# Patient Record
Sex: MALE | Race: WHITE | URBAN - METROPOLITAN AREA
[De-identification: names, ages, dates, MRNs, and addresses within clinical notes are randomized per-mention and may not be internally consistent; named-entity substitution may affect disease eponyms.]

---

## 2018-06-08 ENCOUNTER — EMERGENCY (EMERGENCY)
Facility: HOSPITAL | Age: 58
LOS: 0 days | Discharge: HOME | End: 2018-06-08
Attending: EMERGENCY MEDICINE | Admitting: EMERGENCY MEDICINE

## 2018-06-08 VITALS
OXYGEN SATURATION: 98 % | SYSTOLIC BLOOD PRESSURE: 175 MMHG | RESPIRATION RATE: 19 BRPM | TEMPERATURE: 99 F | DIASTOLIC BLOOD PRESSURE: 97 MMHG | HEART RATE: 100 BPM

## 2018-06-08 VITALS — DIASTOLIC BLOOD PRESSURE: 94 MMHG | SYSTOLIC BLOOD PRESSURE: 148 MMHG | HEART RATE: 88 BPM

## 2018-06-08 DIAGNOSIS — Z79.02 LONG TERM (CURRENT) USE OF ANTITHROMBOTICS/ANTIPLATELETS: ICD-10-CM

## 2018-06-08 DIAGNOSIS — Z79.01 LONG TERM (CURRENT) USE OF ANTICOAGULANTS: ICD-10-CM

## 2018-06-08 DIAGNOSIS — Z79.82 LONG TERM (CURRENT) USE OF ASPIRIN: ICD-10-CM

## 2018-06-08 DIAGNOSIS — I25.810 ATHEROSCLEROSIS OF CORONARY ARTERY BYPASS GRAFT(S) WITHOUT ANGINA PECTORIS: Chronic | ICD-10-CM

## 2018-06-08 DIAGNOSIS — Z79.899 OTHER LONG TERM (CURRENT) DRUG THERAPY: ICD-10-CM

## 2018-06-08 DIAGNOSIS — R06.02 SHORTNESS OF BREATH: ICD-10-CM

## 2018-06-08 DIAGNOSIS — Z79.51 LONG TERM (CURRENT) USE OF INHALED STEROIDS: ICD-10-CM

## 2018-06-08 DIAGNOSIS — Z79.84 LONG TERM (CURRENT) USE OF ORAL HYPOGLYCEMIC DRUGS: ICD-10-CM

## 2018-06-08 DIAGNOSIS — Z87.891 PERSONAL HISTORY OF NICOTINE DEPENDENCE: ICD-10-CM

## 2018-06-08 DIAGNOSIS — J40 BRONCHITIS, NOT SPECIFIED AS ACUTE OR CHRONIC: ICD-10-CM

## 2018-06-08 LAB
ALBUMIN SERPL ELPH-MCNC: 4 G/DL — SIGNIFICANT CHANGE UP (ref 3.5–5.2)
ALP SERPL-CCNC: 83 U/L — SIGNIFICANT CHANGE UP (ref 30–115)
ALT FLD-CCNC: 25 U/L — SIGNIFICANT CHANGE UP (ref 0–41)
ANION GAP SERPL CALC-SCNC: 10 MMOL/L — SIGNIFICANT CHANGE UP (ref 7–14)
APTT BLD: 30.4 SEC — SIGNIFICANT CHANGE UP (ref 27–39.2)
AST SERPL-CCNC: 15 U/L — SIGNIFICANT CHANGE UP (ref 0–41)
BASOPHILS # BLD AUTO: 0.07 K/UL — SIGNIFICANT CHANGE UP (ref 0–0.2)
BASOPHILS NFR BLD AUTO: 0.7 % — SIGNIFICANT CHANGE UP (ref 0–1)
BILIRUB SERPL-MCNC: 0.5 MG/DL — SIGNIFICANT CHANGE UP (ref 0.2–1.2)
BUN SERPL-MCNC: 16 MG/DL — SIGNIFICANT CHANGE UP (ref 10–20)
CALCIUM SERPL-MCNC: 9.4 MG/DL — SIGNIFICANT CHANGE UP (ref 8.5–10.1)
CHLORIDE SERPL-SCNC: 104 MMOL/L — SIGNIFICANT CHANGE UP (ref 98–110)
CK SERPL-CCNC: 87 U/L — SIGNIFICANT CHANGE UP (ref 0–225)
CO2 SERPL-SCNC: 29 MMOL/L — SIGNIFICANT CHANGE UP (ref 17–32)
CREAT SERPL-MCNC: 1 MG/DL — SIGNIFICANT CHANGE UP (ref 0.7–1.5)
EOSINOPHIL # BLD AUTO: 0.27 K/UL — SIGNIFICANT CHANGE UP (ref 0–0.7)
EOSINOPHIL NFR BLD AUTO: 2.7 % — SIGNIFICANT CHANGE UP (ref 0–8)
GLUCOSE SERPL-MCNC: 159 MG/DL — HIGH (ref 70–99)
HCT VFR BLD CALC: 39.7 % — LOW (ref 42–52)
HGB BLD-MCNC: 12.1 G/DL — LOW (ref 14–18)
IMM GRANULOCYTES NFR BLD AUTO: 1.2 % — HIGH (ref 0.1–0.3)
INR BLD: 1.03 RATIO — SIGNIFICANT CHANGE UP (ref 0.65–1.3)
LYMPHOCYTES # BLD AUTO: 2.41 K/UL — SIGNIFICANT CHANGE UP (ref 1.2–3.4)
LYMPHOCYTES # BLD AUTO: 24.5 % — SIGNIFICANT CHANGE UP (ref 20.5–51.1)
MCHC RBC-ENTMCNC: 23.7 PG — LOW (ref 27–31)
MCHC RBC-ENTMCNC: 30.5 G/DL — LOW (ref 32–37)
MCV RBC AUTO: 77.7 FL — LOW (ref 80–94)
MONOCYTES # BLD AUTO: 0.79 K/UL — HIGH (ref 0.1–0.6)
MONOCYTES NFR BLD AUTO: 8 % — SIGNIFICANT CHANGE UP (ref 1.7–9.3)
NEUTROPHILS # BLD AUTO: 6.18 K/UL — SIGNIFICANT CHANGE UP (ref 1.4–6.5)
NEUTROPHILS NFR BLD AUTO: 62.9 % — SIGNIFICANT CHANGE UP (ref 42.2–75.2)
NRBC # BLD: 0 /100 WBCS — SIGNIFICANT CHANGE UP (ref 0–0)
PLATELET # BLD AUTO: 362 K/UL — SIGNIFICANT CHANGE UP (ref 130–400)
POTASSIUM SERPL-MCNC: 4.5 MMOL/L — SIGNIFICANT CHANGE UP (ref 3.5–5)
POTASSIUM SERPL-SCNC: 4.5 MMOL/L — SIGNIFICANT CHANGE UP (ref 3.5–5)
PROT SERPL-MCNC: 6.9 G/DL — SIGNIFICANT CHANGE UP (ref 6–8)
PROTHROM AB SERPL-ACNC: 11.1 SEC — SIGNIFICANT CHANGE UP (ref 9.95–12.87)
RBC # BLD: 5.11 M/UL — SIGNIFICANT CHANGE UP (ref 4.7–6.1)
RBC # FLD: 15 % — HIGH (ref 11.5–14.5)
SODIUM SERPL-SCNC: 143 MMOL/L — SIGNIFICANT CHANGE UP (ref 135–146)
TROPONIN T SERPL-MCNC: <0.01 NG/ML — SIGNIFICANT CHANGE UP
WBC # BLD: 9.84 K/UL — SIGNIFICANT CHANGE UP (ref 4.8–10.8)
WBC # FLD AUTO: 9.84 K/UL — SIGNIFICANT CHANGE UP (ref 4.8–10.8)

## 2018-06-08 RX ORDER — IPRATROPIUM/ALBUTEROL SULFATE 18-103MCG
3 AEROSOL WITH ADAPTER (GRAM) INHALATION ONCE
Qty: 0 | Refills: 0 | Status: COMPLETED | OUTPATIENT
Start: 2018-06-08 | End: 2018-06-08

## 2018-06-08 RX ORDER — SODIUM CHLORIDE 9 MG/ML
3 INJECTION INTRAMUSCULAR; INTRAVENOUS; SUBCUTANEOUS EVERY 8 HOURS
Qty: 0 | Refills: 0 | Status: DISCONTINUED | OUTPATIENT
Start: 2018-06-08 | End: 2018-06-08

## 2018-06-08 RX ORDER — AZITHROMYCIN 500 MG/1
1 TABLET, FILM COATED ORAL
Qty: 6 | Refills: 0
Start: 2018-06-08 | End: 2018-06-12

## 2018-06-08 RX ORDER — ALBUTEROL 90 UG/1
1 AEROSOL, METERED ORAL ONCE
Qty: 0 | Refills: 0 | Status: DISCONTINUED | OUTPATIENT
Start: 2018-06-08 | End: 2018-06-08

## 2018-06-08 RX ADMIN — Medication 3 MILLILITER(S): at 15:30

## 2018-06-08 RX ADMIN — Medication 3 MILLILITER(S): at 14:16

## 2018-06-08 RX ADMIN — SODIUM CHLORIDE 3 MILLILITER(S): 9 INJECTION INTRAMUSCULAR; INTRAVENOUS; SUBCUTANEOUS at 14:16

## 2018-06-08 NOTE — ED PROVIDER NOTE - ATTENDING CONTRIBUTION TO CARE
Patient felt better during ED stay and wished to be d/c home. He wanted no prednisone tx and verbalized understanding of risk/benefit.  I discussed with patient treatment, need to follow-up as well as reasons to return and they agree.  Copies of results were reviewed with and given to patient to bring to f/u.

## 2018-06-08 NOTE — ED PROVIDER NOTE - OBJECTIVE STATEMENT
mild SOB , several week with cough, Worse past 3 days, no pain, + chills and sweats at night, C/o cough up thick sputum at times. No abdominal pain, no C/P, no back pain,

## 2019-02-17 ENCOUNTER — INPATIENT (INPATIENT)
Facility: HOSPITAL | Age: 59
LOS: 2 days | Discharge: HOME | End: 2019-02-20
Attending: INTERNAL MEDICINE | Admitting: INTERNAL MEDICINE

## 2019-02-17 VITALS
SYSTOLIC BLOOD PRESSURE: 168 MMHG | TEMPERATURE: 98 F | DIASTOLIC BLOOD PRESSURE: 82 MMHG | RESPIRATION RATE: 20 BRPM | OXYGEN SATURATION: 98 % | WEIGHT: 227.96 LBS | HEART RATE: 105 BPM | HEIGHT: 67 IN

## 2019-02-17 DIAGNOSIS — I25.810 ATHEROSCLEROSIS OF CORONARY ARTERY BYPASS GRAFT(S) WITHOUT ANGINA PECTORIS: Chronic | ICD-10-CM

## 2019-02-17 DIAGNOSIS — I49.9 CARDIAC ARRHYTHMIA, UNSPECIFIED: Chronic | ICD-10-CM

## 2019-02-17 LAB
ALBUMIN SERPL ELPH-MCNC: 4 G/DL — SIGNIFICANT CHANGE UP (ref 3.5–5.2)
ALP SERPL-CCNC: 65 U/L — SIGNIFICANT CHANGE UP (ref 30–115)
ALT FLD-CCNC: 28 U/L — SIGNIFICANT CHANGE UP (ref 0–41)
ANION GAP SERPL CALC-SCNC: 9 MMOL/L — SIGNIFICANT CHANGE UP (ref 7–14)
AST SERPL-CCNC: 54 U/L — HIGH (ref 0–41)
BASE EXCESS BLDV CALC-SCNC: 2.6 MMOL/L — HIGH (ref -2–2)
BILIRUB SERPL-MCNC: 0.6 MG/DL — SIGNIFICANT CHANGE UP (ref 0.2–1.2)
BUN SERPL-MCNC: 16 MG/DL — SIGNIFICANT CHANGE UP (ref 10–20)
CA-I SERPL-SCNC: 1.2 MMOL/L — SIGNIFICANT CHANGE UP (ref 1.12–1.3)
CALCIUM SERPL-MCNC: 8.9 MG/DL — SIGNIFICANT CHANGE UP (ref 8.5–10.1)
CHLORIDE SERPL-SCNC: 102 MMOL/L — SIGNIFICANT CHANGE UP (ref 98–110)
CO2 SERPL-SCNC: 27 MMOL/L — SIGNIFICANT CHANGE UP (ref 17–32)
CREAT SERPL-MCNC: 0.9 MG/DL — SIGNIFICANT CHANGE UP (ref 0.7–1.5)
GAS PNL BLDV: 140 MMOL/L — SIGNIFICANT CHANGE UP (ref 136–145)
GAS PNL BLDV: SIGNIFICANT CHANGE UP
GLUCOSE SERPL-MCNC: 115 MG/DL — HIGH (ref 70–99)
HCO3 BLDV-SCNC: 27 MMOL/L — SIGNIFICANT CHANGE UP (ref 22–29)
HCT VFR BLD CALC: 41.7 % — LOW (ref 42–52)
HCT VFR BLDA CALC: 39.7 % — SIGNIFICANT CHANGE UP (ref 34–44)
HGB BLD CALC-MCNC: 12.9 G/DL — LOW (ref 14–18)
HGB BLD-MCNC: 12.3 G/DL — LOW (ref 14–18)
HOROWITZ INDEX BLDV+IHG-RTO: 21 — SIGNIFICANT CHANGE UP
LACTATE BLDV-MCNC: 1.3 MMOL/L — SIGNIFICANT CHANGE UP (ref 0.5–1.6)
LIDOCAIN IGE QN: 35 U/L — SIGNIFICANT CHANGE UP (ref 7–60)
MAGNESIUM SERPL-MCNC: 2.2 MG/DL — SIGNIFICANT CHANGE UP (ref 1.8–2.4)
MCHC RBC-ENTMCNC: 23.5 PG — LOW (ref 27–31)
MCHC RBC-ENTMCNC: 29.5 G/DL — LOW (ref 32–37)
MCV RBC AUTO: 79.6 FL — LOW (ref 80–94)
NRBC # BLD: 0 /100 WBCS — SIGNIFICANT CHANGE UP (ref 0–0)
PCO2 BLDV: 41 MMHG — SIGNIFICANT CHANGE UP (ref 41–51)
PH BLDV: 7.43 — SIGNIFICANT CHANGE UP (ref 7.26–7.43)
PLATELET # BLD AUTO: 347 K/UL — SIGNIFICANT CHANGE UP (ref 130–400)
PO2 BLDV: 53 MMHG — HIGH (ref 20–40)
POTASSIUM BLDV-SCNC: 3.6 MMOL/L — SIGNIFICANT CHANGE UP (ref 3.3–5.6)
POTASSIUM SERPL-MCNC: 6.4 MMOL/L — CRITICAL HIGH (ref 3.5–5)
POTASSIUM SERPL-SCNC: 6.4 MMOL/L — CRITICAL HIGH (ref 3.5–5)
PROT SERPL-MCNC: 7.2 G/DL — SIGNIFICANT CHANGE UP (ref 6–8)
RBC # BLD: 5.24 M/UL — SIGNIFICANT CHANGE UP (ref 4.7–6.1)
RBC # FLD: 14.6 % — HIGH (ref 11.5–14.5)
SAO2 % BLDV: 88 % — SIGNIFICANT CHANGE UP
SODIUM SERPL-SCNC: 138 MMOL/L — SIGNIFICANT CHANGE UP (ref 135–146)
TROPONIN T SERPL-MCNC: <0.01 NG/ML — SIGNIFICANT CHANGE UP
WBC # BLD: 11.66 K/UL — HIGH (ref 4.8–10.8)
WBC # FLD AUTO: 11.66 K/UL — HIGH (ref 4.8–10.8)

## 2019-02-17 RX ORDER — CHLORPROMAZINE HCL 10 MG
25 TABLET ORAL ONCE
Qty: 0 | Refills: 0 | Status: COMPLETED | OUTPATIENT
Start: 2019-02-17 | End: 2019-02-17

## 2019-02-17 RX ORDER — FAMOTIDINE 10 MG/ML
20 INJECTION INTRAVENOUS ONCE
Qty: 0 | Refills: 0 | Status: COMPLETED | OUTPATIENT
Start: 2019-02-17 | End: 2019-02-17

## 2019-02-17 RX ORDER — DIPHENHYDRAMINE HYDROCHLORIDE AND LIDOCAINE HYDROCHLORIDE AND ALUMINUM HYDROXIDE AND MAGNESIUM HYDRO
30 KIT ONCE
Qty: 0 | Refills: 0 | Status: COMPLETED | OUTPATIENT
Start: 2019-02-17 | End: 2019-02-17

## 2019-02-17 RX ADMIN — Medication 25 MILLIGRAM(S): at 20:35

## 2019-02-17 RX ADMIN — FAMOTIDINE 20 MILLIGRAM(S): 10 INJECTION INTRAVENOUS at 21:24

## 2019-02-17 RX ADMIN — FAMOTIDINE 100 MILLIGRAM(S): 10 INJECTION INTRAVENOUS at 20:53

## 2019-02-17 RX ADMIN — DIPHENHYDRAMINE HYDROCHLORIDE AND LIDOCAINE HYDROCHLORIDE AND ALUMINUM HYDROXIDE AND MAGNESIUM HYDRO 30 MILLILITER(S): KIT at 21:24

## 2019-02-17 NOTE — ED ADULT TRIAGE NOTE - CHIEF COMPLAINT QUOTE
I feel pressure in my chest and I've had hiccups for 3 days.  I went to urgent care and they gave me Gabapentin but it didn't work.

## 2019-02-17 NOTE — ED ADULT NURSE NOTE - PMH
Cardiac abnormality    Chronic obstructive pulmonary disease    GERD (gastroesophageal reflux disease)

## 2019-02-17 NOTE — ED PROVIDER NOTE - PHYSICAL EXAMINATION
CONST: Well appearing in NAD  EYES: Sclera and conjunctiva clear.  CARD: Normal S1 S2; Normal rate and rhythm  RESP: Equal BS B/L, No wheezes, rhonchi or rales. No distress  GI: Soft, non-tender, non-distended.  MS: Normal ROM in all extremities. No edema of lower extremities, no calf pain, radial/pedal pulses 2+ bilaterally  SKIN: Warm, dry, no acute rashes. Good turgor  NEURO: A&Ox3, No focal deficits. Strength 5/5 with no sensory deficits

## 2019-02-17 NOTE — ED ADULT NURSE NOTE - PSH
Cardiac arrhythmia  bypass surgery 9 years ago  Coronary artery disease involving other coronary artery bypass graft, angina presence unspecified

## 2019-02-17 NOTE — ED PROVIDER NOTE - ATTENDING CONTRIBUTION TO CARE
59 yo M with history of longstanding gastritis, previous erosive esophagitis, recently non compliant with GI meds/follow up, CAD sp 2 vessel CABG 9 years ago, here for assessment of intractable hiccups x 3 days. Sx began after patient had multiple episodes of self induced vomiting with dyspepsia which he attributes to eating pizza right after eating cereal. Today he developed epigastric pain/pressure after a string of "strong" hiccups which prompted him to come to ED.    He has tried many home remedies for hiccups, was seen at [facility] and prescribed gabapentin but sx have not improved.     Currently he reports pain is mild, no associated dizziness, dyspnea.     Of note, he has had previous intractable hiccups x 1 episode about 20 years ago.    Patient is tachy, however looks well despite hiccups, has epigastric tenderness to palpation.     Hiccups are most likely GI in origin, however given cardiac history have to consider possible cardiac etiology as well.    Will give thorazine, check labs, EKG and reassess. Will consider additional chest/abdominal imaging as needed.

## 2019-02-17 NOTE — ED PROVIDER NOTE - NS ED ROS FT
Constitutional: See HPI.  Eyes: No visual changes, eye pain or discharge.   ENMT: No hearing changes, pain, discharge or infections.   Cardiac: No SOB or edema. No chest pain with exertion.  Respiratory: No cough or respiratory distress.  GI: No nausea, vomiting, diarrhea or abdominal pain.  : No dysuria, frequency or burning. No Discharge  MS: No myalgia, muscle weakness, joint pain or back pain.  Neuro: No headache or weakness  Skin: No skin rash.  Except as documented in the HPI, all other systems are negative.

## 2019-02-17 NOTE — ED PROVIDER NOTE - PROGRESS NOTE DETAILS
pt signed out pending CTA Chest D/w patient all results and copies given. Aware of Nodules. Pt is still hiccupping despite multiple medications. D/w Dr. Johnson

## 2019-02-17 NOTE — ED PROVIDER NOTE - OBJECTIVE STATEMENT
58 y.o male w/ hx of Dm, bypass, HTN, HLD, bypass x 2 presents to the ED for evaluation of chest pressure x 1 day.  Pt states that for the past 3 days experiencing constant hiccups.  Today developed midsternal chest pressure which is nonradiating/ nonexertional prompting visit to the ED.  No associated dyspnea, diaphoresis. Also admits to burning sensation in chest.  Pt adds no other complaints at this time. 58 y.o male w/ hx of Dm, bypass, HTN, HLD, bypass x 2 presents to the ED for evaluation of chest pressure x 1 day.  Pt states that for the past 3 days experiencing constant hiccups.  Today developed midsternal chest pressure which is nonradiating/ nonexertional prompting visit to the ED.  No associated dyspnea, diaphoresis. Also admits to burning sensation in chest.  Pt adds no other complaints at this time.  Cardiologist Dr. Carmona.  No recent cardiac workup in past yr. 58 y.o male w/ hx of DM, esophageal ulcers, HTN, HLD, bypass x 2 presents to the ED for evaluation of chest pressure x 1 day.  Pt states that for the past 3 days experiencing constant hiccups.  Today developed midsternal chest pressure which is nonradiating/ nonexertional prompting visit to the ED.  No associated dyspnea, diaphoresis. Also admits to burning sensation in chest.  Pt adds no other complaints at this time.  Cardiologist Dr. Carmona.  No recent cardiac workup in past yr.

## 2019-02-17 NOTE — ED ADULT NURSE NOTE - NSIMPLEMENTINTERV_GEN_ALL_ED
Implemented All Universal Safety Interventions:  [place] to call system. Call bell, personal items and telephone within reach. Instruct patient to call for assistance. Room bathroom lighting operational. Non-slip footwear when patient is off stretcher. Physically safe environment: no spills, clutter or unnecessary equipment. Stretcher in lowest position, wheels locked, appropriate side rails in place.

## 2019-02-17 NOTE — ED PROVIDER NOTE - CLINICAL SUMMARY MEDICAL DECISION MAKING FREE TEXT BOX
Despite multiple medications, hiccups persisted, patient had CT chest to rule out aortic dissection, mediastinal pathology of hiccups and was found to have esophagitis on CT -- given this, plus inability to control hiccups, patient to be admitted, will need urgent GI eval.

## 2019-02-18 DIAGNOSIS — K21.9 GASTRO-ESOPHAGEAL REFLUX DISEASE WITHOUT ESOPHAGITIS: ICD-10-CM

## 2019-02-18 DIAGNOSIS — R07.89 OTHER CHEST PAIN: ICD-10-CM

## 2019-02-18 DIAGNOSIS — E78.00 PURE HYPERCHOLESTEROLEMIA, UNSPECIFIED: ICD-10-CM

## 2019-02-18 DIAGNOSIS — K25.7 CHRONIC GASTRIC ULCER WITHOUT HEMORRHAGE OR PERFORATION: ICD-10-CM

## 2019-02-18 DIAGNOSIS — R06.6 HICCOUGH: ICD-10-CM

## 2019-02-18 DIAGNOSIS — J44.9 CHRONIC OBSTRUCTIVE PULMONARY DISEASE, UNSPECIFIED: ICD-10-CM

## 2019-02-18 LAB
GLUCOSE BLDC GLUCOMTR-MCNC: 123 MG/DL — HIGH (ref 70–99)
GLUCOSE BLDC GLUCOMTR-MCNC: 134 MG/DL — HIGH (ref 70–99)
HCV AB S/CO SERPL IA: 0.54 S/CO — SIGNIFICANT CHANGE UP (ref 0–0.79)
HCV AB SERPL-IMP: SIGNIFICANT CHANGE UP
TROPONIN T SERPL-MCNC: <0.01 NG/ML — SIGNIFICANT CHANGE UP

## 2019-02-18 RX ORDER — METFORMIN HYDROCHLORIDE 850 MG/1
500 TABLET ORAL
Qty: 0 | Refills: 0 | Status: DISCONTINUED | OUTPATIENT
Start: 2019-02-18 | End: 2019-02-18

## 2019-02-18 RX ORDER — ASPIRIN/CALCIUM CARB/MAGNESIUM 324 MG
81 TABLET ORAL DAILY
Qty: 0 | Refills: 0 | Status: DISCONTINUED | OUTPATIENT
Start: 2019-02-18 | End: 2019-02-20

## 2019-02-18 RX ORDER — HEPARIN SODIUM 5000 [USP'U]/ML
5000 INJECTION INTRAVENOUS; SUBCUTANEOUS EVERY 12 HOURS
Qty: 0 | Refills: 0 | Status: DISCONTINUED | OUTPATIENT
Start: 2019-02-18 | End: 2019-02-20

## 2019-02-18 RX ORDER — PANTOPRAZOLE SODIUM 20 MG/1
40 TABLET, DELAYED RELEASE ORAL ONCE
Qty: 0 | Refills: 0 | Status: COMPLETED | OUTPATIENT
Start: 2019-02-18 | End: 2019-02-18

## 2019-02-18 RX ORDER — CLOPIDOGREL BISULFATE 75 MG/1
75 TABLET, FILM COATED ORAL DAILY
Qty: 0 | Refills: 0 | Status: DISCONTINUED | OUTPATIENT
Start: 2019-02-18 | End: 2019-02-20

## 2019-02-18 RX ORDER — NIFEDIPINE 30 MG
10 TABLET, EXTENDED RELEASE 24 HR ORAL THREE TIMES A DAY
Qty: 0 | Refills: 0 | Status: DISCONTINUED | OUTPATIENT
Start: 2019-02-18 | End: 2019-02-18

## 2019-02-18 RX ORDER — PANTOPRAZOLE SODIUM 20 MG/1
40 TABLET, DELAYED RELEASE ORAL
Qty: 0 | Refills: 0 | Status: DISCONTINUED | OUTPATIENT
Start: 2019-02-18 | End: 2019-02-18

## 2019-02-18 RX ORDER — METOPROLOL TARTRATE 50 MG
25 TABLET ORAL DAILY
Qty: 0 | Refills: 0 | Status: DISCONTINUED | OUTPATIENT
Start: 2019-02-18 | End: 2019-02-20

## 2019-02-18 RX ORDER — CHLORPROMAZINE HCL 10 MG
25 TABLET ORAL THREE TIMES A DAY
Qty: 0 | Refills: 0 | Status: DISCONTINUED | OUTPATIENT
Start: 2019-02-18 | End: 2019-02-19

## 2019-02-18 RX ORDER — HEPARIN SODIUM 5000 [USP'U]/ML
5000 INJECTION INTRAVENOUS; SUBCUTANEOUS EVERY 12 HOURS
Qty: 0 | Refills: 0 | Status: DISCONTINUED | OUTPATIENT
Start: 2019-02-18 | End: 2019-02-18

## 2019-02-18 RX ORDER — SUCRALFATE 1 G
1 TABLET ORAL
Qty: 0 | Refills: 0 | Status: DISCONTINUED | OUTPATIENT
Start: 2019-02-18 | End: 2019-02-20

## 2019-02-18 RX ORDER — NIFEDIPINE 30 MG
30 TABLET, EXTENDED RELEASE 24 HR ORAL DAILY
Qty: 0 | Refills: 0 | Status: DISCONTINUED | OUTPATIENT
Start: 2019-02-18 | End: 2019-02-20

## 2019-02-18 RX ORDER — PANTOPRAZOLE SODIUM 20 MG/1
40 TABLET, DELAYED RELEASE ORAL
Qty: 0 | Refills: 0 | Status: DISCONTINUED | OUTPATIENT
Start: 2019-02-18 | End: 2019-02-20

## 2019-02-18 RX ORDER — METOCLOPRAMIDE HCL 10 MG
10 TABLET ORAL ONCE
Qty: 0 | Refills: 0 | Status: COMPLETED | OUTPATIENT
Start: 2019-02-18 | End: 2019-02-18

## 2019-02-18 RX ORDER — ALBUTEROL 90 UG/1
2.5 AEROSOL, METERED ORAL ONCE
Qty: 0 | Refills: 0 | Status: DISCONTINUED | OUTPATIENT
Start: 2019-02-18 | End: 2019-02-20

## 2019-02-18 RX ADMIN — Medication 25 MILLIGRAM(S): at 06:14

## 2019-02-18 RX ADMIN — Medication 1 GRAM(S): at 18:21

## 2019-02-18 RX ADMIN — Medication 25 MILLIGRAM(S): at 15:55

## 2019-02-18 RX ADMIN — Medication 81 MILLIGRAM(S): at 11:08

## 2019-02-18 RX ADMIN — PANTOPRAZOLE SODIUM 40 MILLIGRAM(S): 20 TABLET, DELAYED RELEASE ORAL at 18:21

## 2019-02-18 RX ADMIN — Medication 1 GRAM(S): at 11:11

## 2019-02-18 RX ADMIN — Medication 104 MILLIGRAM(S): at 01:08

## 2019-02-18 RX ADMIN — CLOPIDOGREL BISULFATE 75 MILLIGRAM(S): 75 TABLET, FILM COATED ORAL at 11:08

## 2019-02-18 RX ADMIN — PANTOPRAZOLE SODIUM 40 MILLIGRAM(S): 20 TABLET, DELAYED RELEASE ORAL at 06:14

## 2019-02-18 RX ADMIN — METFORMIN HYDROCHLORIDE 500 MILLIGRAM(S): 850 TABLET ORAL at 06:14

## 2019-02-18 RX ADMIN — Medication 30 MILLIGRAM(S): at 13:18

## 2019-02-18 RX ADMIN — PANTOPRAZOLE SODIUM 40 MILLIGRAM(S): 20 TABLET, DELAYED RELEASE ORAL at 01:08

## 2019-02-18 RX ADMIN — Medication 10 MILLIGRAM(S): at 11:10

## 2019-02-18 RX ADMIN — Medication 10 MILLIGRAM(S): at 06:14

## 2019-02-18 RX ADMIN — Medication 1 GRAM(S): at 23:05

## 2019-02-18 NOTE — H&P ADULT - NEUROLOGICAL DETAILS
sensation intact/cranial nerves intact/superficial reflexes intact/normal strength/deep reflexes intact/no spontaneous movement/alert and oriented x 3/responds to pain/responds to verbal commands

## 2019-02-18 NOTE — PROGRESS NOTE ADULT - ASSESSMENT
58 y.o male w/ hx of DM, esophageal ulcers, HTN, HLD, bypass x 2 presents to the ED for evaluation of chest pressure x 1 day.  Pt states that for the past 3 days experiencing constant hiccups, which started after he ate pizza and then induced vomiting, with viscous brown material.  Day of presentation developed midsternal chest pressure which is nonradiating/ nonexertional prompting visit to the ED.     1. Chest pain  now resolved, CE negative x2, EKG without st/t wave changes, similar to prior  outpatient f/u with Dr Cardozo  dc tele    2. Hiccups/Esophageal Ulcer in the past  patient also endorses epigastric pain  CTA remarkable for thickened esophagus, with fluid, concern for esophagitis  Last EGD/[facility] few years ago  c/w thorazine, PPI Q12, Carafate AC and HS  Gi eval Dr Giordano    3. DM  monitor FS, hold metformin given recent contrast administration  can start insulin if FS elevated    4. CAD s/p Bypass  c/w ASA and plavix    5. HTN  c/w nifedipine    dvt ppx    #Progress Note Handoff  Pending (specify):  Consults___x______, Tests________, Test Results_______, Other_________  Family discussion: with patient  Disposition: Home_x__/SNF___/Other________/Unknown at this time________ 58 y.o male w/ hx of DM, esophageal ulcers, HTN, HLD, bypass x 2 presents to the ED for evaluation of chest pressure x 1 day.  Pt states that for the past 3 days experiencing constant hiccups, which started after he ate pizza and then induced vomiting, with viscous brown material.  Day of presentation developed midsternal chest pressure which is nonradiating/ nonexertional prompting visit to the ED.     1. Chest pain  now resolved, CE negative x2, EKG without st/t wave changes, similar to prior  outpatient f/u with Dr Cardozo  dc tele    2. Hiccups/Esophageal Ulcer in the past  patient also endorses epigastric pain  CTA remarkable for thickened esophagus, with fluid, concern for esophagitis  Last EGD/[facility] few years ago  c/w thorazine, PPI Q12, Carafate AC and HS  clears for now, will advance once seen by GI  Gi eval Dr Giordano    3. DM  monitor FS, hold metformin given recent contrast administration  can start insulin if FS elevated    4. CAD s/p Bypass  c/w ASA and plavix    5. HTN  c/w nifedipine    dvt ppx    #Progress Note Handoff  Pending (specify):  Consults___x______, Tests________, Test Results_______, Other_________  Family discussion: with patient  Disposition: Home_x__/SNF___/Other________/Unknown at this time________

## 2019-02-18 NOTE — PROGRESS NOTE ADULT - SUBJECTIVE AND OBJECTIVE BOX
----- Message from Gloria Ambrosio KATH MCCOLLUM  58y Male    CHIEF COMPLAINT:    Patient is a 58y old  Male who presents with a chief complaint of pain over the  upper part of chest   and   shoulder (18 Feb 2019 01:34)      INTERVAL HPI/OVERNIGHT EVENTS:    Patient seen and examined. No acute events overnight. Continues to hiccup    ROS: All other systems are negative.    Vital Signs:    T(F): 98 (02-18-19 @ 05:21), Max: 98 (02-18-19 @ 05:21)  HR: 87 (02-18-19 @ 05:21) (87 - 105)  BP: 144/85 (02-18-19 @ 05:21) (144/85 - 168/82)  RR: 18 (02-18-19 @ 05:21) (18 - 20)  SpO2: 96% (02-18-19 @ 05:21) (96% - 98%)  I&O's Summary    POCT Blood Glucose.: 123 mg/dL (18 Feb 2019 07:35)      PHYSICAL EXAM:    GENERAL:  NAD  SKIN: No rashes or lesions  HEENT: Atraumatic. Normocephalic.  NECK: Supple, No JVD. No lymphadenopathy.  PULMONARY: CTA B/L. No wheezing. No rales  CVS: Normal S1, S2. Rate and Rhythm are regular.   ABDOMEN/GI: Soft, mild epigastric TTP, Nondistended; BS present  MSK:  No edema B/L LE. No clubbing/cyanosis  NEUROLOGIC:  No motor or sensory deficit.  PSYCH: Alert & oriented x 3, normal affect      LABS:                        12.3   11.66 )-----------( 347      ( 17 Feb 2019 19:55 )             41.7     02-17    138  |  102  |  16  ----------------------------<  115<H>  6.4<HH>   |  27  |  0.9    Ca    8.9      17 Feb 2019 19:55  Mg     2.2     02-17    TPro  7.2  /  Alb  4.0  /  TBili  0.6  /  DBili  x   /  AST  54<H>  /  ALT  28  /  AlkPhos  65  02-17    Trop <0.01, CKMB --, CK --, 02-18-19 @ 07:05  Trop <0.01, CKMB --, CK --, 02-17-19 @ 19:55    RADIOLOGY & ADDITIONAL TESTS:    Imaging or report Personally Reviewed:  [x] YES  [ ] NO  EKG reviewed: [x] YES  [ ] NO    Medications:  Standing  aspirin enteric coated 81 milliGRAM(s) Oral daily  clopidogrel Tablet 75 milliGRAM(s) Oral daily  heparin  Injectable 5000 Unit(s) SubCutaneous every 12 hours  metFORMIN 500 milliGRAM(s) Oral two times a day  metoprolol succinate ER 25 milliGRAM(s) Oral daily  NIFEdipine XL 30 milliGRAM(s) Oral daily  pantoprazole    Tablet 40 milliGRAM(s) Oral before breakfast  PARoxetine 10 milliGRAM(s) Oral daily    PRN Meds  ALBUTerol    0.083%. 2.5 milliGRAM(s) Nebulizer once PRN

## 2019-02-18 NOTE — H&P ADULT - NSHPREVIEWOFSYSTEMS_GEN_ALL_CORE
· Review of Systems: Constitutional: See HPI.  	Eyes: No visual changes, eye pain or discharge.   	ENMT: No hearing changes, pain, discharge or infections.   	Cardiac: No SOB or edema. No chest pain with exertion.  	Respiratory: No cough or respiratory distress.  	GI: No nausea, vomiting, diarrhea or abdominal pain.  	: No dysuria, frequency or burning. No Discharge  	MS: No myalgia, muscle weakness, joint pain or back pain.  	Neuro: No headache or weakness  	Skin: No skin rash.  Except as documented in the HPI, all other systems are negative.    PHYSICAL EXAM:

## 2019-02-18 NOTE — H&P ADULT - ASSESSMENT
Patient is a 58y old  Male who presents with a chief complaint of                                                                                                                                                                                                                                                                                      HEALTH ISSUES - PROBLEM Dx: Patient is a 58y old  Male who presents with a chief complaint of    chest   dyscomfort/ e[[facility] dyscomfort                                                                                                                                                                                                                                                                                    HEALTH ISSUES - PROBLEM Dx:chest pain/hiccups/hcvd  dm/

## 2019-02-18 NOTE — H&P ADULT - NSHPLABSRESULTS_GEN_ALL_CORE
12.3   11.66 )-----------( 347      ( 17 Feb 2019 19:55 )             41.7     02-17    138  |  102  |  16  ----------------------------<  115<H>  6.4<HH>   |  27  |  0.9    Ca    8.9      17 Feb 2019 19:55  Mg     2.2     02-17    TPro  7.2  /  Alb  4.0  /  TBili  0.6  /  DBili  x   /  AST  54<H>  /  ALT  28  /  AlkPhos  65  02-17              Lactate Trend    CARDIAC MARKERS ( 17 Feb 2019 19:55 )  x     / <0.01 ng/mL / x     / x     / x          CAPILLARY BLOOD GLUCOSE

## 2019-02-18 NOTE — H&P ADULT - HISTORY OF PRESENT ILLNESS
· Chief Complaint: The patient is a 58y Male complaining of chest pressure.	  · HPI Objective Statement: 58 y.o male w/ hx of DM, esophageal ulcers, HTN, HLD, bypass x 2 presents to the ED for evaluation of chest pressure x 1 day.  Pt states that for the past 3 days experiencing constant hiccups.  Today developed midsternal chest pressure which is nonradiating/ nonexertional prompting visit to the ED.  No associated dyspnea, diaphoresis. Also admits to burning sensation in chest.  Pt adds no other complaints at this time.  Cardiologist Dr. Carmona.  No recent cardiac workup in past yr.

## 2019-02-18 NOTE — H&P ADULT - PMH
Cardiac abnormality    Chronic gastric ulcer without hemorrhage and without perforation    Chronic obstructive pulmonary disease    GERD (gastroesophageal reflux disease)    High cholesterol

## 2019-02-19 LAB
ANION GAP SERPL CALC-SCNC: 9 MMOL/L — SIGNIFICANT CHANGE UP (ref 7–14)
BASOPHILS # BLD AUTO: 0.05 K/UL — SIGNIFICANT CHANGE UP (ref 0–0.2)
BASOPHILS NFR BLD AUTO: 0.5 % — SIGNIFICANT CHANGE UP (ref 0–1)
BUN SERPL-MCNC: 12 MG/DL — SIGNIFICANT CHANGE UP (ref 10–20)
CALCIUM SERPL-MCNC: 8.5 MG/DL — SIGNIFICANT CHANGE UP (ref 8.5–10.1)
CHLORIDE SERPL-SCNC: 103 MMOL/L — SIGNIFICANT CHANGE UP (ref 98–110)
CO2 SERPL-SCNC: 30 MMOL/L — SIGNIFICANT CHANGE UP (ref 17–32)
CREAT SERPL-MCNC: 0.8 MG/DL — SIGNIFICANT CHANGE UP (ref 0.7–1.5)
EOSINOPHIL # BLD AUTO: 0.32 K/UL — SIGNIFICANT CHANGE UP (ref 0–0.7)
EOSINOPHIL NFR BLD AUTO: 3 % — SIGNIFICANT CHANGE UP (ref 0–8)
GLUCOSE BLDC GLUCOMTR-MCNC: 103 MG/DL — HIGH (ref 70–99)
GLUCOSE BLDC GLUCOMTR-MCNC: 105 MG/DL — HIGH (ref 70–99)
GLUCOSE BLDC GLUCOMTR-MCNC: 108 MG/DL — HIGH (ref 70–99)
GLUCOSE BLDC GLUCOMTR-MCNC: 109 MG/DL — HIGH (ref 70–99)
GLUCOSE SERPL-MCNC: 130 MG/DL — HIGH (ref 70–99)
HCT VFR BLD CALC: 40.8 % — LOW (ref 42–52)
HGB BLD-MCNC: 11.9 G/DL — LOW (ref 14–18)
IMM GRANULOCYTES NFR BLD AUTO: 0.5 % — HIGH (ref 0.1–0.3)
LYMPHOCYTES # BLD AUTO: 2.37 K/UL — SIGNIFICANT CHANGE UP (ref 1.2–3.4)
LYMPHOCYTES # BLD AUTO: 22.1 % — SIGNIFICANT CHANGE UP (ref 20.5–51.1)
MAGNESIUM SERPL-MCNC: 2.2 MG/DL — SIGNIFICANT CHANGE UP (ref 1.8–2.4)
MCHC RBC-ENTMCNC: 23.8 PG — LOW (ref 27–31)
MCHC RBC-ENTMCNC: 29.2 G/DL — LOW (ref 32–37)
MCV RBC AUTO: 81.6 FL — SIGNIFICANT CHANGE UP (ref 80–94)
MONOCYTES # BLD AUTO: 0.96 K/UL — HIGH (ref 0.1–0.6)
MONOCYTES NFR BLD AUTO: 9 % — SIGNIFICANT CHANGE UP (ref 1.7–9.3)
NEUTROPHILS # BLD AUTO: 6.97 K/UL — HIGH (ref 1.4–6.5)
NEUTROPHILS NFR BLD AUTO: 64.9 % — SIGNIFICANT CHANGE UP (ref 42.2–75.2)
NRBC # BLD: 0 /100 WBCS — SIGNIFICANT CHANGE UP (ref 0–0)
PLATELET # BLD AUTO: 357 K/UL — SIGNIFICANT CHANGE UP (ref 130–400)
POTASSIUM SERPL-MCNC: 4.4 MMOL/L — SIGNIFICANT CHANGE UP (ref 3.5–5)
POTASSIUM SERPL-SCNC: 4.4 MMOL/L — SIGNIFICANT CHANGE UP (ref 3.5–5)
RBC # BLD: 5 M/UL — SIGNIFICANT CHANGE UP (ref 4.7–6.1)
RBC # FLD: 14.7 % — HIGH (ref 11.5–14.5)
SODIUM SERPL-SCNC: 142 MMOL/L — SIGNIFICANT CHANGE UP (ref 135–146)
WBC # BLD: 10.72 K/UL — SIGNIFICANT CHANGE UP (ref 4.8–10.8)
WBC # FLD AUTO: 10.72 K/UL — SIGNIFICANT CHANGE UP (ref 4.8–10.8)

## 2019-02-19 RX ORDER — CHLORPROMAZINE HCL 10 MG
25 TABLET ORAL
Qty: 0 | Refills: 0 | Status: DISCONTINUED | OUTPATIENT
Start: 2019-02-19 | End: 2019-02-20

## 2019-02-19 RX ORDER — METOCLOPRAMIDE HCL 10 MG
5 TABLET ORAL EVERY 12 HOURS
Qty: 0 | Refills: 0 | Status: DISCONTINUED | OUTPATIENT
Start: 2019-02-19 | End: 2019-02-20

## 2019-02-19 RX ADMIN — Medication 10 MILLIGRAM(S): at 11:08

## 2019-02-19 RX ADMIN — Medication 25 MILLIGRAM(S): at 18:17

## 2019-02-19 RX ADMIN — Medication 81 MILLIGRAM(S): at 11:08

## 2019-02-19 RX ADMIN — Medication 1 GRAM(S): at 05:17

## 2019-02-19 RX ADMIN — CLOPIDOGREL BISULFATE 75 MILLIGRAM(S): 75 TABLET, FILM COATED ORAL at 11:08

## 2019-02-19 RX ADMIN — Medication 1 GRAM(S): at 18:17

## 2019-02-19 RX ADMIN — Medication 30 MILLIGRAM(S): at 05:16

## 2019-02-19 RX ADMIN — Medication 25 MILLIGRAM(S): at 05:16

## 2019-02-19 RX ADMIN — Medication 1 GRAM(S): at 23:10

## 2019-02-19 RX ADMIN — Medication 1 GRAM(S): at 11:07

## 2019-02-19 RX ADMIN — Medication 5 MILLIGRAM(S): at 11:07

## 2019-02-19 RX ADMIN — PANTOPRAZOLE SODIUM 40 MILLIGRAM(S): 20 TABLET, DELAYED RELEASE ORAL at 18:17

## 2019-02-19 RX ADMIN — PANTOPRAZOLE SODIUM 40 MILLIGRAM(S): 20 TABLET, DELAYED RELEASE ORAL at 05:17

## 2019-02-19 NOTE — PROGRESS NOTE ADULT - ASSESSMENT
58 y.o male w/ hx of DM, esophageal ulcers, HTN, HLD, bypass x 2 presents to the ED for evaluation of chest pressure x 1 day.  Pt states that for the past 3 days experiencing constant hiccups, which started after he ate pizza and then induced vomiting, with viscous brown material.  Day of presentation developed midsternal chest pressure which is nonradiating/ nonexertional prompting visit to the ED.     1. Chest pain  now resolved, CE negative x2, EKG without st/t wave changes, similar to prior  outpatient f/u with Dr Cardozo  dc tele    2. Hiccups/Esophageal Ulcer in the past/Hiatal hernia  persistent hiccups  CTA remarkable for thickened esophagus, with fluid, concern for esophagitis  Last EGD/[facility] few years ago  c/w  PPI Q12, Carafate AC and HS, Reglan PRN  d/c thorazine as patient not tolerating the s/e  Discussed case with Dr Giordano, for EGD today/tomorrow  NPO until then    3. DM  monitor FS, hold metformin given recent contrast administration  FS controlled, pt on clears/now NPO  if needed, may resume Metformin 2/20    4. CAD s/p Bypass  c/w ASA and plavix    5. HTN  c/w nifedipine    dvt ppx    #Progress Note Handoff  Pending (specify):  Consults_______, Tests_____EGD___, Test Results_______, Other_________  Family discussion: with patient  Disposition: Home_x__/SNF___/Other________/Unknown at this time________

## 2019-02-19 NOTE — PROGRESS NOTE ADULT - SUBJECTIVE AND OBJECTIVE BOX
KATH MCCOLLUM  58y Male    CHIEF COMPLAINT:    Patient is a 58y old  Male who presents with a chief complaint of pain over the  upper part of chest   and   shoulder (19 Feb 2019 09:35)      INTERVAL HPI/OVERNIGHT EVENTS:    Patient seen and examined. No acute events overnight. Still with hiccups    ROS: All other systems are negative.    Vital Signs:    T(F): 97.2 (02-19-19 @ 05:15), Max: 97.2 (02-19-19 @ 05:15)  HR: 77 (02-19-19 @ 05:15) (74 - 86)  BP: 173/68 (02-19-19 @ 05:15) (135/61 - 173/68)  RR: 16 (02-19-19 @ 05:15) (16 - 18)    POCT Blood Glucose.: 109 mg/dL (19 Feb 2019 07:25)  POCT Blood Glucose.: 134 mg/dL (18 Feb 2019 12:56)      PHYSICAL EXAM:    GENERAL:  NAD  SKIN: No rashes or lesions  HEENT: Atraumatic. Normocephalic.   NECK: Supple, No JVD.   PULMONARY: CTA B/L. No wheezing. No rales  CVS: Normal S1, S2. Rate and Rhythm are regular.   ABDOMEN/GI: Soft, Nontender, Nondistended; BS present  MSK:  No edema B/L LE. No clubbing or cyanosis  NEUROLOGIC:  No motor or sensory deficit.  PSYCH: Alert & oriented x 3, normal affect    Consultant(s) Notes Reviewed:  [x ] YES  [ ] NO  Care Discussed with Consultants/Other Providers [ x] YES  [ ] NO    LABS:                        11.9   10.72 )-----------( 357      ( 19 Feb 2019 09:36 )             40.8     02-19    142  |  103  |  12  ----------------------------<  130<H>  4.4   |  30  |  0.8    Ca    8.5      19 Feb 2019 09:36  Mg     2.2     02-19    TPro  7.2  /  Alb  4.0  /  TBili  0.6  /  DBili  x   /  AST  54<H>  /  ALT  28  /  AlkPhos  65  02-17    Trop <0.01, CKMB --, CK --, 02-18-19 @ 07:05  Trop <0.01, CKMB --, CK --, 02-17-19 @ 19:55  RADIOLOGY & ADDITIONAL TESTS:    Imaging or report Personally Reviewed:  [x] YES  [ ] NO  EKG reviewed: [x] YES  [ ] NO    Medications:  Standing  aspirin enteric coated 81 milliGRAM(s) Oral daily  chlorproMAZINE    Tablet 25 milliGRAM(s) Oral three times a day  clopidogrel Tablet 75 milliGRAM(s) Oral daily  heparin  Injectable 5000 Unit(s) SubCutaneous every 12 hours  metoprolol succinate ER 25 milliGRAM(s) Oral daily  NIFEdipine XL 30 milliGRAM(s) Oral daily  pantoprazole    Tablet 40 milliGRAM(s) Oral two times a day  PARoxetine 10 milliGRAM(s) Oral daily  sucralfate 1 Gram(s) Oral four times a day    PRN Meds  ALBUTerol    0.083%. 2.5 milliGRAM(s) Nebulizer once PRN

## 2019-02-19 NOTE — CONSULT NOTE ADULT - SUBJECTIVE AND OBJECTIVE BOX
Chief Complaint: Patient is a 58y old  Male who presents with a chief complaint of pain over the  upper part of chest   and   shoulder (18 Feb 2019 09:26)      HPI:  Pt has been hiccuping for 5 days.  Has had similar problem off and on for 40 years and has been attributed to GERD and hiatal hernia.  Has been on thorazine for relief but it usually makes him extremely sedated.      Medications:  ALBUTerol    0.083%. 2.5 milliGRAM(s) Nebulizer once PRN  aspirin enteric coated 81 milliGRAM(s) Oral daily  chlorproMAZINE    Tablet 25 milliGRAM(s) Oral three times a day  clopidogrel Tablet 75 milliGRAM(s) Oral daily  heparin  Injectable 5000 Unit(s) SubCutaneous every 12 hours  metoprolol succinate ER 25 milliGRAM(s) Oral daily  NIFEdipine XL 30 milliGRAM(s) Oral daily  pantoprazole    Tablet 40 milliGRAM(s) Oral two times a day  PARoxetine 10 milliGRAM(s) Oral daily  sucralfate 1 Gram(s) Oral four times a day      PMHX/PSHX:  Chronic gastric ulcer without hemorrhage and without perforation  High cholesterol  GERD (gastroesophageal reflux disease)  Cardiac abnormality  Chronic obstructive pulmonary disease  Cardiac arrhythmia  Coronary artery disease involving other coronary artery bypass graft, angina presence unspecified      Family history:  No pertinent family history in first degree relatives      Social History:     Allergies:  No Known Allergies        Review of Systems:  General:  No wt loss, fevers, chills, night sweats, fatigue or pruritis.  Eyes:  Good vision, no reported pain or redness.  ENT:  No sore throat, pain, runny nose, or difficulty swallowing  CV:  No pain, palpitations, hypo/hypertension  Resp:  No dyspnea, cough, tachypnea, wheezing  GI:  No pain, nausea, vomiting, dysphagia, heartburn, diarrhea, constipation, or weight loss. , No rectal bleeding, tarry stools, or hematemesis.  :  No pain, bleeding/discharges, incontinence, nocturia  Musculoskeletal:  No pain, weakness or fasciculations.  Neuro:  No weakness, tingling, memory problems or paresthesias  Psych:  No fatigue, insomnia, mood problems, depression  Endocrine:  No polyuria, polydipsia, cold/heat intolerance  Heme:  No petechiae, ecchymosis, easy bruisability  Skin:  No rash, pruritis, tattoos, scars, or edema      PHYSICAL EXAM:   Vital Signs:  Vital Signs Last 24 Hrs  T(C): 36.2 (19 Feb 2019 05:15), Max: 36.2 (19 Feb 2019 05:15)  T(F): 97.2 (19 Feb 2019 05:15), Max: 97.2 (19 Feb 2019 05:15)  HR: 77 (19 Feb 2019 05:15) (74 - 86)  BP: 173/68 (19 Feb 2019 05:15) (135/61 - 173/68)  BP(mean): --  RR: 16 (19 Feb 2019 05:15) (16 - 18)  SpO2: --  Daily Height in cm: 170.18 (18 Feb 2019 18:56)    Daily     T(C): 36.2 (02-19-19 @ 05:15), Max: 36.2 (02-19-19 @ 05:15)  HR: 77 (02-19-19 @ 05:15) (74 - 86)  BP: 173/68 (02-19-19 @ 05:15) (135/61 - 173/68)  RR: 16 (02-19-19 @ 05:15) (16 - 18)  SpO2: --    GENERAL:  Appears stated age, well-groomed, well-nourished, no distress  HEENT:  Conjunctivae clear and pink, no thyromegaly, nodules, adenopathy, no JVD, sclera -anicteric  CHEST:  Full & symmetric excursion, no increased effort, breath sounds clear  HEART:  Regular rhythm, S1, S2, no murmur/rub/S3/S4, no abdominal bruit, no edema  ABDOMEN:  Soft, non-tender, non-distended, normoactive bowel sounds,  no masses ,no hepato-splenomegaly, no signs of chronic liver disease  EXTEREMITIES:  no cyanosis,clubbing or edema  SKIN:  No rash/erythema/ecchymoses/petechiae/wounds/abscess/warm/dry  NEURO:  Alert, oriented, no asterixis, no tremor, no encephalopathy    LABS:                        12.3   11.66 )-----------( 347      ( 17 Feb 2019 19:55 )             41.7     02-17    138  |  102  |  16  ----------------------------<  115<H>  6.4<HH>   |  27  |  0.9    Ca    8.9      17 Feb 2019 19:55  Mg     2.2     02-17    TPro  7.2  /  Alb  4.0  /  TBili  0.6  /  DBili  x   /  AST  54<H>  /  ALT  28  /  AlkPhos  65  02-17    LIVER FUNCTIONS - ( 17 Feb 2019 19:55 )  Alb: 4.0 g/dL / Pro: 7.2 g/dL / ALK PHOS: 65 U/L / ALT: 28 U/L / AST: 54 U/L / GGT: x                   Imaging:      Assessment and Plan:  IMP:  Chronic hiccups  likely seconday to reflux ds and nhiatal hernia - which irritates the diaphragm    REC: will try to schedule EGD for today - scheduling may be a problem due to time constraints    NPO from now on

## 2019-02-20 ENCOUNTER — TRANSCRIPTION ENCOUNTER (OUTPATIENT)
Age: 59
End: 2019-02-20

## 2019-02-20 VITALS
DIASTOLIC BLOOD PRESSURE: 98 MMHG | HEART RATE: 59 BPM | HEIGHT: 66.93 IN | SYSTOLIC BLOOD PRESSURE: 183 MMHG | OXYGEN SATURATION: 97 % | WEIGHT: 227.96 LBS | RESPIRATION RATE: 18 BRPM

## 2019-02-20 LAB
GLUCOSE BLDC GLUCOMTR-MCNC: 103 MG/DL — HIGH (ref 70–99)
GLUCOSE BLDC GLUCOMTR-MCNC: 134 MG/DL — HIGH (ref 70–99)

## 2019-02-20 RX ORDER — SODIUM CHLORIDE 9 MG/ML
1000 INJECTION, SOLUTION INTRAVENOUS
Qty: 0 | Refills: 0 | Status: DISCONTINUED | OUTPATIENT
Start: 2019-02-20 | End: 2019-02-20

## 2019-02-20 RX ORDER — PANTOPRAZOLE SODIUM 20 MG/1
1 TABLET, DELAYED RELEASE ORAL
Qty: 60 | Refills: 0
Start: 2019-02-20 | End: 2019-03-21

## 2019-02-20 RX ADMIN — Medication 1 GRAM(S): at 12:26

## 2019-02-20 RX ADMIN — Medication 30 MILLIGRAM(S): at 05:16

## 2019-02-20 RX ADMIN — PANTOPRAZOLE SODIUM 40 MILLIGRAM(S): 20 TABLET, DELAYED RELEASE ORAL at 05:16

## 2019-02-20 RX ADMIN — Medication 1 GRAM(S): at 05:16

## 2019-02-20 RX ADMIN — Medication 25 MILLIGRAM(S): at 05:15

## 2019-02-20 RX ADMIN — Medication 10 MILLIGRAM(S): at 12:26

## 2019-02-20 NOTE — PROGRESS NOTE ADULT - SUBJECTIVE AND OBJECTIVE BOX
EGD Findings:  1. 1-2= wesophagits  2. Hiatal Hernia  3 Multiple fundic gland polyps  4.  Mild reflux esophagitis      REC: Anti reflux diet  switch to po protonix 40 mg BID  Pt can be d/c'd - stress complianc

## 2019-02-20 NOTE — DISCHARGE NOTE ADULT - CARE PLAN
Principal Discharge DX:	Hiccups  Goal:	eval and treat  Assessment and plan of treatment:	Per GI Chronic hiccups  likely secondary to reflux ds and hiatal hernia - which irritates the diaphragm. CTA remarkable for thickened esophagus, with fluid, concern for esophagitis. Treated with PPI Q12, Carafate AC and HS, Reglan PRN while inpatient. Underwent EGD today which showed  Secondary Diagnosis:	Esophagitis  Goal:	eval  Assessment and plan of treatment:	as above  Secondary Diagnosis:	Chest pain, atypical  Goal:	eval and treat  Assessment and plan of treatment:	Atypical chest pain likely 2/2 hiccups/reflux (resolved). CE negative x2. EKG without st/t wave changes, similar to prior. Outpatient f/u with Dr Cardozo Principal Discharge DX:	Hiccups  Goal:	eval and treat  Assessment and plan of treatment:	Per GI Chronic hiccups  likely secondary to reflux ds and hiatal hernia - which irritates the diaphragm. CTA remarkable for thickened esophagus, with fluid, concern for esophagitis. Treated with PPI Q12, Carafate AC and HS, Reglan PRN while inpatient. Underwent EGD today which showed EGD esophagitis, Hiatal Hernia, Multiple fundic gland polyps. Mild reflux esophagitis. Per GI, switch to protonix 40mg BID and d/c home.  Secondary Diagnosis:	Esophagitis  Goal:	eval  Assessment and plan of treatment:	as above  Secondary Diagnosis:	Chest pain, atypical  Goal:	eval and treat  Assessment and plan of treatment:	Atypical chest pain likely 2/2 hiccups/reflux (resolved). CE negative x2. EKG without st/t wave changes, similar to prior. Outpatient f/u with Dr Cardozo

## 2019-02-20 NOTE — DISCHARGE NOTE ADULT - MEDICATION SUMMARY - MEDICATIONS TO TAKE
I will START or STAY ON the medications listed below when I get home from the hospital:    Aspir 81 oral delayed release tablet  -- 1 tab(s) by mouth once a day  -- Indication: For Home med    valsartan 40 mg oral tablet  -- 1 tab(s) by mouth 2 times a day  -- Indication: For Home med    Paxil 10 mg oral tablet  -- 1 tab(s) by mouth once a day  -- Indication: For Home med    Janumet 50 mg-500 mg oral tablet  -- 1 tab(s) by mouth 2 times a day  -- Indication: For Home med    Plavix 75 mg oral tablet  -- 1 tab(s) by mouth once a day  -- Indication: For Home med    metoprolol succinate 25 mg oral tablet, extended release  -- 1 tab(s) by mouth once a day  -- Indication: For Home med    Advair Diskus 100 mcg-50 mcg inhalation powder  -- 1 puff(s) inhaled 2 times a day  -- Indication: For Home     Proventil 2.5 mg/3 mL (0.083%) inhalation solution  -- 3 milliliter(s) inhaled every 6 hours  -- Indication: For Home med    Breo Ellipta 100 mcg-25 mcg/inh inhalation powder  -- 1 puff(s) inhaled once a day  -- Indication: For Home med     NIFEdipine 10 mg oral capsule  -- 1 cap(s) by mouth 3 times a day  -- Indication: For Home med    Prevacid 30 mg oral delayed release capsule  -- 1 cap(s) by mouth once a day  -- Indication: For Home med I will START or STAY ON the medications listed below when I get home from the hospital:    Aspir 81 oral delayed release tablet  -- 1 tab(s) by mouth once a day  -- Indication: For Home med    valsartan 40 mg oral tablet  -- 1 tab(s) by mouth 2 times a day  -- Indication: For Home med    Paxil 10 mg oral tablet  -- 1 tab(s) by mouth once a day  -- Indication: For Home med    Janumet 50 mg-500 mg oral tablet  -- 1 tab(s) by mouth 2 times a day  -- Indication: For Home med    Plavix 75 mg oral tablet  -- 1 tab(s) by mouth once a day  -- Indication: For Home med    metoprolol succinate 25 mg oral tablet, extended release  -- 1 tab(s) by mouth once a day  -- Indication: For Home med    Advair Diskus 100 mcg-50 mcg inhalation powder  -- 1 puff(s) inhaled 2 times a day  -- Indication: For Home     Proventil 2.5 mg/3 mL (0.083%) inhalation solution  -- 3 milliliter(s) inhaled every 6 hours  -- Indication: For Home med    Breo Ellipta 100 mcg-25 mcg/inh inhalation powder  -- 1 puff(s) inhaled once a day  -- Indication: For Home med     NIFEdipine 10 mg oral capsule  -- 1 cap(s) by mouth 3 times a day  -- Indication: For Home med    Protonix 40 mg oral delayed release tablet  -- 1 tab(s) by mouth 2 times a day   -- It is very important that you take or use this exactly as directed.  Do not skip doses or discontinue unless directed by your doctor.  Obtain medical advice before taking any non-prescription drugs as some may affect the action of this medication.  Swallow whole.  Do not crush.    -- Indication: For Esophagitis

## 2019-02-20 NOTE — PROGRESS NOTE ADULT - ASSESSMENT
57 yo M w/ PMHx of DM, esophageal ulcers, HTN, HLD, bypass x 2 presented for evaluation of chest pressure x 1 day.  Pt stated that for the 3 days PTA he was experiencing constant hiccups, which started after he ate pizza and then induced vomiting, with viscous brown material. Day of presentation developed midsternal chest pressure which is nonradiating/ nonexertional prompting visit to the ED.     Atypical chest pain likely 2/2 hiccups/reflux (resolved)  - CE negative x2  - EKG without st/t wave changes, similar to prior  - outpatient f/u with Dr Cardozo  - no events noted on tele    Hiccups/Esophageal Ulcer in the past/Hiatal hernia  - persistent hiccups, improved today  - per GI Chronic hiccups  likely secondary to reflux ds and hiatal hernia - which irritates the diaphragm  - CTA remarkable for thickened esophagus, with fluid, concern for esophagitis  - Last EGD/[facility] few years ago  - c/w PPI Q12, Carafate AC and HS, Reglan PRN  - thorazine was d/c as patient not tolerating the s/e  - scheduled for EGD today    DM  - monitor FS, hold metformin given recent contrast administration  - FS controlled  - monitor FS and start insulin if > 180    CAD s/p Bypass  - c/w ASA and plavix    HTN  c/w nifedipine    DVT ppx  - HSQ    Dispo: from home

## 2019-02-20 NOTE — DISCHARGE NOTE ADULT - HOSPITAL COURSE
59 yo M w/ PMHx of DM, esophageal ulcers, HTN, HLD, bypass x 2 presented for evaluation of chest pressure x 1 day.  Pt stated that for the 3 days PTA he was experiencing constant hiccups, which started after he ate pizza and then induced vomiting, with viscous brown material. Day of presentation developed midsternal chest pressure which is nonradiating/ nonexertional prompting visit to the ED. Atypical chest pain likely 2/2 hiccups/reflux (resolved). CE negative x2. EKG without st/t wave changes, similar to prior. No events noted on telemetry. Outpatient f/u with Dr Cardozo upon discharge. Per GI Chronic hiccups  likely secondary to reflux ds and hiatal hernia - which irritates the diaphragm. CTA remarkable for thickened esophagus, with fluid, concern for esophagitis. Treated with PPI Q12, Carafate AC and HS, Reglan PRN while inpatient. Underwent EGD today which showed 57 yo M w/ PMHx of DM, esophageal ulcers, HTN, HLD, bypass x 2 presented for evaluation of chest pressure x 1 day.  Pt stated that for the 3 days PTA he was experiencing constant hiccups, which started after he ate pizza and then induced vomiting, with viscous brown material. Day of presentation developed midsternal chest pressure which is nonradiating/ nonexertional prompting visit to the ED. Atypical chest pain likely 2/2 hiccups/reflux (resolved). CE negative x2. EKG without st/t wave changes, similar to prior. No events noted on telemetry. Outpatient f/u with Dr Cardozo upon discharge. Per GI Chronic hiccups  likely secondary to reflux ds and hiatal hernia - which irritates the diaphragm. CTA remarkable for thickened esophagus, with fluid, concern for esophagitis. Treated with PPI Q12, Carafate AC and HS, Reglan PRN while inpatient. Underwent EGD today which showed EGD esophagitis, Hiatal Hernia, Multiple fundic gland polyps. Mild reflux esophagitis. Per GI, switch to protonix 40mg BID and d/c home. Plan of care and importance of medication compliance discussed with patient.

## 2019-02-20 NOTE — DISCHARGE NOTE ADULT - PATIENT PORTAL LINK FT
You can access the [facility] Patient Portal, offered by [facility], by registering with the following website: [email or web address]

## 2019-02-20 NOTE — PROGRESS NOTE ADULT - REASON FOR ADMISSION
pain over the  upper part of chest   and   shoulder

## 2019-02-20 NOTE — DISCHARGE NOTE ADULT - PLAN OF CARE
eval and treat Per GI Chronic hiccups  likely secondary to reflux ds and hiatal hernia - which irritates the diaphragm. CTA remarkable for thickened esophagus, with fluid, concern for esophagitis. Treated with PPI Q12, Carafate AC and HS, Reglan PRN while inpatient. Underwent EGD today which showed eval as above Atypical chest pain likely 2/2 hiccups/reflux (resolved). CE negative x2. EKG without st/t wave changes, similar to prior. Outpatient f/u with Dr Cardozo Per GI Chronic hiccups  likely secondary to reflux ds and hiatal hernia - which irritates the diaphragm. CTA remarkable for thickened esophagus, with fluid, concern for esophagitis. Treated with PPI Q12, Carafate AC and HS, Reglan PRN while inpatient. Underwent EGD today which showed EGD esophagitis, Hiatal Hernia, Multiple fundic gland polyps. Mild reflux esophagitis. Per GI, switch to protonix 40mg BID and d/c home.

## 2019-02-20 NOTE — DISCHARGE NOTE ADULT - CARE PROVIDER_API CALL
Preethi Rider)  Internal Medicine  [address] [place] [address]  Phone: [phone]  Fax: [phone]  Follow Up Time:     Filemon Giordano)  Gastroenterology; Internal Medicine  [address] [place] [address]  Phone: [phone]  Fax: [phone]  Follow Up Time:

## 2019-02-21 LAB — GLUCOSE BLDC GLUCOMTR-MCNC: 112 MG/DL — HIGH (ref 70–99)

## 2019-02-22 LAB — SURGICAL PATHOLOGY STUDY: SIGNIFICANT CHANGE UP

## 2019-02-26 DIAGNOSIS — R06.6 HICCOUGH: ICD-10-CM

## 2019-02-26 DIAGNOSIS — E78.5 HYPERLIPIDEMIA, UNSPECIFIED: ICD-10-CM

## 2019-02-26 DIAGNOSIS — K25.7 CHRONIC GASTRIC ULCER WITHOUT HEMORRHAGE OR PERFORATION: ICD-10-CM

## 2019-02-26 DIAGNOSIS — Z79.899 OTHER LONG TERM (CURRENT) DRUG THERAPY: ICD-10-CM

## 2019-02-26 DIAGNOSIS — K31.7 POLYP OF STOMACH AND DUODENUM: ICD-10-CM

## 2019-02-26 DIAGNOSIS — E11.9 TYPE 2 DIABETES MELLITUS WITHOUT COMPLICATIONS: ICD-10-CM

## 2019-02-26 DIAGNOSIS — Z79.02 LONG TERM (CURRENT) USE OF ANTITHROMBOTICS/ANTIPLATELETS: ICD-10-CM

## 2019-02-26 DIAGNOSIS — Z79.82 LONG TERM (CURRENT) USE OF ASPIRIN: ICD-10-CM

## 2019-02-26 DIAGNOSIS — J44.9 CHRONIC OBSTRUCTIVE PULMONARY DISEASE, UNSPECIFIED: ICD-10-CM

## 2019-02-26 DIAGNOSIS — Z95.1 PRESENCE OF AORTOCORONARY BYPASS GRAFT: ICD-10-CM

## 2019-02-26 DIAGNOSIS — K44.9 DIAPHRAGMATIC HERNIA WITHOUT OBSTRUCTION OR GANGRENE: ICD-10-CM

## 2019-02-26 DIAGNOSIS — K21.0 GASTRO-ESOPHAGEAL REFLUX DISEASE WITH ESOPHAGITIS: ICD-10-CM

## 2019-02-26 DIAGNOSIS — R07.89 OTHER CHEST PAIN: ICD-10-CM

## 2019-02-26 DIAGNOSIS — I25.118 ATHEROSCLEROTIC HEART DISEASE OF NATIVE CORONARY ARTERY WITH OTHER FORMS OF ANGINA PECTORIS: ICD-10-CM

## 2020-08-16 ENCOUNTER — INPATIENT (INPATIENT)
Facility: HOSPITAL | Age: 60
LOS: 1 days | Discharge: HOME | End: 2020-08-18
Attending: INTERNAL MEDICINE | Admitting: INTERNAL MEDICINE
Payer: MEDICAID

## 2020-08-16 VITALS
HEART RATE: 62 BPM | OXYGEN SATURATION: 98 % | SYSTOLIC BLOOD PRESSURE: 130 MMHG | DIASTOLIC BLOOD PRESSURE: 76 MMHG | RESPIRATION RATE: 20 BRPM | WEIGHT: 212.08 LBS | TEMPERATURE: 98 F

## 2020-08-16 DIAGNOSIS — I21.29 ST ELEVATION (STEMI) MYOCARDIAL INFARCTION INVOLVING OTHER SITES: ICD-10-CM

## 2020-08-16 DIAGNOSIS — Z95.1 PRESENCE OF AORTOCORONARY BYPASS GRAFT: ICD-10-CM

## 2020-08-16 DIAGNOSIS — Z79.82 LONG TERM (CURRENT) USE OF ASPIRIN: ICD-10-CM

## 2020-08-16 DIAGNOSIS — I49.9 CARDIAC ARRHYTHMIA, UNSPECIFIED: Chronic | ICD-10-CM

## 2020-08-16 DIAGNOSIS — I47.2 VENTRICULAR TACHYCARDIA: ICD-10-CM

## 2020-08-16 DIAGNOSIS — K21.0 GASTRO-ESOPHAGEAL REFLUX DISEASE WITH ESOPHAGITIS: ICD-10-CM

## 2020-08-16 DIAGNOSIS — K25.7 CHRONIC GASTRIC ULCER WITHOUT HEMORRHAGE OR PERFORATION: ICD-10-CM

## 2020-08-16 DIAGNOSIS — E11.9 TYPE 2 DIABETES MELLITUS WITHOUT COMPLICATIONS: ICD-10-CM

## 2020-08-16 DIAGNOSIS — R76.0 RAISED ANTIBODY TITER: ICD-10-CM

## 2020-08-16 DIAGNOSIS — E78.5 HYPERLIPIDEMIA, UNSPECIFIED: ICD-10-CM

## 2020-08-16 DIAGNOSIS — R50.9 FEVER, UNSPECIFIED: ICD-10-CM

## 2020-08-16 DIAGNOSIS — Z79.84 LONG TERM (CURRENT) USE OF ORAL HYPOGLYCEMIC DRUGS: ICD-10-CM

## 2020-08-16 DIAGNOSIS — I10 ESSENTIAL (PRIMARY) HYPERTENSION: ICD-10-CM

## 2020-08-16 DIAGNOSIS — I25.810 ATHEROSCLEROSIS OF CORONARY ARTERY BYPASS GRAFT(S) WITHOUT ANGINA PECTORIS: Chronic | ICD-10-CM

## 2020-08-16 DIAGNOSIS — Z87.891 PERSONAL HISTORY OF NICOTINE DEPENDENCE: ICD-10-CM

## 2020-08-16 DIAGNOSIS — Z82.49 FAMILY HISTORY OF ISCHEMIC HEART DISEASE AND OTHER DISEASES OF THE CIRCULATORY SYSTEM: ICD-10-CM

## 2020-08-16 DIAGNOSIS — K22.10 ULCER OF ESOPHAGUS WITHOUT BLEEDING: ICD-10-CM

## 2020-08-16 DIAGNOSIS — I25.118 ATHEROSCLEROTIC HEART DISEASE OF NATIVE CORONARY ARTERY WITH OTHER FORMS OF ANGINA PECTORIS: ICD-10-CM

## 2020-08-16 DIAGNOSIS — Z79.02 LONG TERM (CURRENT) USE OF ANTITHROMBOTICS/ANTIPLATELETS: ICD-10-CM

## 2020-08-16 DIAGNOSIS — J44.9 CHRONIC OBSTRUCTIVE PULMONARY DISEASE, UNSPECIFIED: ICD-10-CM

## 2020-08-16 DIAGNOSIS — N28.9 DISORDER OF KIDNEY AND URETER, UNSPECIFIED: ICD-10-CM

## 2020-08-16 DIAGNOSIS — Z79.51 LONG TERM (CURRENT) USE OF INHALED STEROIDS: ICD-10-CM

## 2020-08-16 LAB
ALBUMIN SERPL ELPH-MCNC: 4.3 G/DL — SIGNIFICANT CHANGE UP (ref 3.5–5.2)
ALP SERPL-CCNC: 77 U/L — SIGNIFICANT CHANGE UP (ref 30–115)
ALT FLD-CCNC: 25 U/L — SIGNIFICANT CHANGE UP (ref 0–41)
ANION GAP SERPL CALC-SCNC: 13 MMOL/L — SIGNIFICANT CHANGE UP (ref 7–14)
APTT BLD: 34.1 SEC — SIGNIFICANT CHANGE UP (ref 27–39.2)
AST SERPL-CCNC: 45 U/L — HIGH (ref 0–41)
BASOPHILS # BLD AUTO: 0.07 K/UL — SIGNIFICANT CHANGE UP (ref 0–0.2)
BASOPHILS NFR BLD AUTO: 0.5 % — SIGNIFICANT CHANGE UP (ref 0–1)
BILIRUB SERPL-MCNC: 1.1 MG/DL — SIGNIFICANT CHANGE UP (ref 0.2–1.2)
BUN SERPL-MCNC: 14 MG/DL — SIGNIFICANT CHANGE UP (ref 10–20)
CALCIUM SERPL-MCNC: 10.1 MG/DL — SIGNIFICANT CHANGE UP (ref 8.5–10.1)
CHLORIDE SERPL-SCNC: 100 MMOL/L — SIGNIFICANT CHANGE UP (ref 98–110)
CO2 SERPL-SCNC: 27 MMOL/L — SIGNIFICANT CHANGE UP (ref 17–32)
CREAT SERPL-MCNC: 1.3 MG/DL — SIGNIFICANT CHANGE UP (ref 0.7–1.5)
EOSINOPHIL # BLD AUTO: 0.24 K/UL — SIGNIFICANT CHANGE UP (ref 0–0.7)
EOSINOPHIL NFR BLD AUTO: 1.6 % — SIGNIFICANT CHANGE UP (ref 0–8)
GLUCOSE BLDC GLUCOMTR-MCNC: 137 MG/DL — HIGH (ref 70–99)
GLUCOSE SERPL-MCNC: 137 MG/DL — HIGH (ref 70–99)
HCT VFR BLD CALC: 44.1 % — SIGNIFICANT CHANGE UP (ref 42–52)
HGB BLD-MCNC: 13.7 G/DL — LOW (ref 14–18)
IMM GRANULOCYTES NFR BLD AUTO: 0.5 % — HIGH (ref 0.1–0.3)
INR BLD: 1.1 RATIO — SIGNIFICANT CHANGE UP (ref 0.65–1.3)
LACTATE SERPL-SCNC: 2.6 MMOL/L — HIGH (ref 0.7–2)
LIDOCAIN IGE QN: 26 U/L — SIGNIFICANT CHANGE UP (ref 7–60)
LYMPHOCYTES # BLD AUTO: 27.4 % — SIGNIFICANT CHANGE UP (ref 20.5–51.1)
LYMPHOCYTES # BLD AUTO: 4.18 K/UL — HIGH (ref 1.2–3.4)
MAGNESIUM SERPL-MCNC: 2.2 MG/DL — SIGNIFICANT CHANGE UP (ref 1.8–2.4)
MCHC RBC-ENTMCNC: 26.3 PG — LOW (ref 27–31)
MCHC RBC-ENTMCNC: 31.1 G/DL — LOW (ref 32–37)
MCV RBC AUTO: 84.8 FL — SIGNIFICANT CHANGE UP (ref 80–94)
MONOCYTES # BLD AUTO: 1.95 K/UL — HIGH (ref 0.1–0.6)
MONOCYTES NFR BLD AUTO: 12.8 % — HIGH (ref 1.7–9.3)
NEUTROPHILS # BLD AUTO: 8.72 K/UL — HIGH (ref 1.4–6.5)
NEUTROPHILS NFR BLD AUTO: 57.2 % — SIGNIFICANT CHANGE UP (ref 42.2–75.2)
NRBC # BLD: 0 /100 WBCS — SIGNIFICANT CHANGE UP (ref 0–0)
NT-PROBNP SERPL-SCNC: 2336 PG/ML — HIGH (ref 0–300)
PLATELET # BLD AUTO: 395 K/UL — SIGNIFICANT CHANGE UP (ref 130–400)
POTASSIUM SERPL-MCNC: 4.1 MMOL/L — SIGNIFICANT CHANGE UP (ref 3.5–5)
POTASSIUM SERPL-SCNC: 4.1 MMOL/L — SIGNIFICANT CHANGE UP (ref 3.5–5)
PROT SERPL-MCNC: 7 G/DL — SIGNIFICANT CHANGE UP (ref 6–8)
PROTHROM AB SERPL-ACNC: 12.7 SEC — SIGNIFICANT CHANGE UP (ref 9.95–12.87)
RBC # BLD: 5.2 M/UL — SIGNIFICANT CHANGE UP (ref 4.7–6.1)
RBC # FLD: 13.5 % — SIGNIFICANT CHANGE UP (ref 11.5–14.5)
SARS-COV-2 RNA SPEC QL NAA+PROBE: SIGNIFICANT CHANGE UP
SODIUM SERPL-SCNC: 140 MMOL/L — SIGNIFICANT CHANGE UP (ref 135–146)
TROPONIN T SERPL-MCNC: 0.42 NG/ML — CRITICAL HIGH
WBC # BLD: 15.24 K/UL — HIGH (ref 4.8–10.8)
WBC # FLD AUTO: 15.24 K/UL — HIGH (ref 4.8–10.8)

## 2020-08-16 PROCEDURE — 99222 1ST HOSP IP/OBS MODERATE 55: CPT | Mod: 57

## 2020-08-16 PROCEDURE — 99291 CRITICAL CARE FIRST HOUR: CPT

## 2020-08-16 PROCEDURE — 99497 ADVNCD CARE PLAN 30 MIN: CPT | Mod: 25

## 2020-08-16 PROCEDURE — 99223 1ST HOSP IP/OBS HIGH 75: CPT

## 2020-08-16 PROCEDURE — 93459 L HRT ART/GRFT ANGIO: CPT | Mod: 26,XU

## 2020-08-16 PROCEDURE — 92941 PRQ TRLML REVSC TOT OCCL AMI: CPT | Mod: LC

## 2020-08-16 RX ORDER — TICAGRELOR 90 MG/1
90 TABLET ORAL
Refills: 0 | Status: DISCONTINUED | OUTPATIENT
Start: 2020-08-17 | End: 2020-08-18

## 2020-08-16 RX ORDER — HEPARIN SODIUM 5000 [USP'U]/ML
INJECTION INTRAVENOUS; SUBCUTANEOUS
Qty: 25000 | Refills: 0 | Status: DISCONTINUED | OUTPATIENT
Start: 2020-08-16 | End: 2020-08-16

## 2020-08-16 RX ORDER — ASPIRIN/CALCIUM CARB/MAGNESIUM 324 MG
81 TABLET ORAL DAILY
Refills: 0 | Status: DISCONTINUED | OUTPATIENT
Start: 2020-08-17 | End: 2020-08-18

## 2020-08-16 RX ORDER — ATORVASTATIN CALCIUM 80 MG/1
80 TABLET, FILM COATED ORAL AT BEDTIME
Refills: 0 | Status: DISCONTINUED | OUTPATIENT
Start: 2020-08-16 | End: 2020-08-18

## 2020-08-16 RX ORDER — ONDANSETRON 8 MG/1
4 TABLET, FILM COATED ORAL ONCE
Refills: 0 | Status: COMPLETED | OUTPATIENT
Start: 2020-08-16 | End: 2020-08-16

## 2020-08-16 RX ORDER — HEPARIN SODIUM 5000 [USP'U]/ML
4000 INJECTION INTRAVENOUS; SUBCUTANEOUS ONCE
Refills: 0 | Status: DISCONTINUED | OUTPATIENT
Start: 2020-08-16 | End: 2020-08-16

## 2020-08-16 RX ORDER — HEPARIN SODIUM 5000 [USP'U]/ML
4000 INJECTION INTRAVENOUS; SUBCUTANEOUS EVERY 6 HOURS
Refills: 0 | Status: DISCONTINUED | OUTPATIENT
Start: 2020-08-16 | End: 2020-08-16

## 2020-08-16 RX ORDER — CHLORHEXIDINE GLUCONATE 213 G/1000ML
1 SOLUTION TOPICAL EVERY 24 HOURS
Refills: 0 | Status: DISCONTINUED | OUTPATIENT
Start: 2020-08-16 | End: 2020-08-17

## 2020-08-16 RX ORDER — PANTOPRAZOLE SODIUM 20 MG/1
40 TABLET, DELAYED RELEASE ORAL
Refills: 0 | Status: DISCONTINUED | OUTPATIENT
Start: 2020-08-16 | End: 2020-08-18

## 2020-08-16 RX ORDER — SODIUM CHLORIDE 9 MG/ML
1000 INJECTION INTRAMUSCULAR; INTRAVENOUS; SUBCUTANEOUS
Refills: 0 | Status: DISCONTINUED | OUTPATIENT
Start: 2020-08-16 | End: 2020-08-18

## 2020-08-16 RX ORDER — DIPHENHYDRAMINE HYDROCHLORIDE AND LIDOCAINE HYDROCHLORIDE AND ALUMINUM HYDROXIDE AND MAGNESIUM HYDRO
30 KIT ONCE
Refills: 0 | Status: COMPLETED | OUTPATIENT
Start: 2020-08-16 | End: 2020-08-16

## 2020-08-16 RX ORDER — NIFEDIPINE 30 MG
10 TABLET, EXTENDED RELEASE 24 HR ORAL THREE TIMES A DAY
Refills: 0 | Status: DISCONTINUED | OUTPATIENT
Start: 2020-08-16 | End: 2020-08-17

## 2020-08-16 RX ORDER — METOPROLOL TARTRATE 50 MG
25 TABLET ORAL DAILY
Refills: 0 | Status: DISCONTINUED | OUTPATIENT
Start: 2020-08-17 | End: 2020-08-17

## 2020-08-16 RX ORDER — ONDANSETRON 8 MG/1
4 TABLET, FILM COATED ORAL ONCE
Refills: 0 | Status: DISCONTINUED | OUTPATIENT
Start: 2020-08-16 | End: 2020-08-16

## 2020-08-16 RX ORDER — FAMOTIDINE 10 MG/ML
20 INJECTION INTRAVENOUS ONCE
Refills: 0 | Status: COMPLETED | OUTPATIENT
Start: 2020-08-16 | End: 2020-08-16

## 2020-08-16 RX ORDER — CHLORHEXIDINE GLUCONATE 213 G/1000ML
1 SOLUTION TOPICAL
Refills: 0 | Status: DISCONTINUED | OUTPATIENT
Start: 2020-08-16 | End: 2020-08-18

## 2020-08-16 RX ORDER — SUCRALFATE 1 G
1 TABLET ORAL ONCE
Refills: 0 | Status: COMPLETED | OUTPATIENT
Start: 2020-08-16 | End: 2020-08-16

## 2020-08-16 RX ORDER — CLOPIDOGREL BISULFATE 75 MG/1
600 TABLET, FILM COATED ORAL ONCE
Refills: 0 | Status: COMPLETED | OUTPATIENT
Start: 2020-08-16 | End: 2020-08-16

## 2020-08-16 RX ORDER — ASPIRIN/CALCIUM CARB/MAGNESIUM 324 MG
325 TABLET ORAL ONCE
Refills: 0 | Status: COMPLETED | OUTPATIENT
Start: 2020-08-16 | End: 2020-08-16

## 2020-08-16 RX ORDER — ALBUTEROL 90 UG/1
1.25 AEROSOL, METERED ORAL
Refills: 0 | Status: DISCONTINUED | OUTPATIENT
Start: 2020-08-16 | End: 2020-08-18

## 2020-08-16 RX ADMIN — ONDANSETRON 4 MILLIGRAM(S): 8 TABLET, FILM COATED ORAL at 23:09

## 2020-08-16 RX ADMIN — Medication 10 MILLIGRAM(S): at 21:13

## 2020-08-16 RX ADMIN — Medication 325 MILLIGRAM(S): at 16:49

## 2020-08-16 RX ADMIN — CLOPIDOGREL BISULFATE 600 MILLIGRAM(S): 75 TABLET, FILM COATED ORAL at 17:06

## 2020-08-16 RX ADMIN — HEPARIN SODIUM 1000 UNIT(S)/HR: 5000 INJECTION INTRAVENOUS; SUBCUTANEOUS at 17:05

## 2020-08-16 RX ADMIN — ATORVASTATIN CALCIUM 80 MILLIGRAM(S): 80 TABLET, FILM COATED ORAL at 21:13

## 2020-08-16 RX ADMIN — FAMOTIDINE 100 MILLIGRAM(S): 10 INJECTION INTRAVENOUS at 16:51

## 2020-08-16 NOTE — H&P ADULT - NSHPREVIEWOFSYSTEMS_GEN_ALL_CORE
ROS: Dry Cough, one reading of low grade fever yesterday that resolved with Aspirin.    PMH:   HTN, DLD, DM type II, CAD s/p CABG ( 12 years ago).     PSH:   CHRISTIANSON CABG ( 12 years ago)     Family Hx: Not significant for CAD ( his grandfather had CAD at an age > 60 yrs (62 specifically)).     Risk Factors:   - HTN   -DM   -DLD  - Ex-smoker (Quit Smoking 15 years ago)

## 2020-08-16 NOTE — ED PROVIDER NOTE - ATTENDING CONTRIBUTION TO CARE
61 yo M pmh of htn, hld, cad, cabg presents with chest pain x 2 days. Patient states that he recently traveled to [place]. 2 days ago started to have right sided chest pain, 8/10, pressure like, radiating to both arms. + N/V no abdominal pain. Last cath multiple years ago. Unknown when last stress test was. cardiologist is Dr. Carmona.     CONSTITUTIONAL: Well-developed; well-nourished; in no acute distress.   SKIN: warm, dry  HEAD: Normocephalic; atraumatic.  EYES: PERRL, EOMI, no conjunctival erythema  ENT: No nasal discharge; airway clear.  NECK: Supple; non tender.  CARD: S1, S2 normal;  Regular rate and rhythm.   RESP: No wheezes, rales or rhonchi.  ABD: soft non tender, non distended, no rebound or guarding  EXT: Normal ROM.  5/5 strength in all 4 extremities   LYMPH: No acute cervical adenopathy.  NEURO: Alert, oriented, grossly unremarkable. neurovascularly intact  PSYCH: Cooperative, appropriate.

## 2020-08-16 NOTE — H&P ADULT - NSHPSOCIALHISTORY_GEN_ALL_CORE
Ex-smoker ( Quit smoking 15 years ago) Ex-smoker ( Quit smoking 15 years ago), denies ETOH and illicit drug abuse

## 2020-08-16 NOTE — H&P ADULT - NSHPLABSRESULTS_GEN_ALL_CORE
Complete Blood Count + Automated Diff (08.16.20 @ 16:40)    WBC Count: 15.24 K/uL    RBC Count: 5.20 M/uL    Hemoglobin: 13.7 g/dL    Hematocrit: 44.1 %    Mean Cell Volume: 84.8 fL    Mean Cell Hemoglobin: 26.3 pg    Mean Cell Hemoglobin Conc: 31.1 g/dL    Red Cell Distrib Width: 13.5 %    Platelet Count - Automated: 395 K/uL    Auto Neutrophil #: 8.72 K/uL    Auto Lymphocyte #: 4.18 K/uL    Auto Monocyte #: 1.95 K/uL    Auto Eosinophil #: 0.24 K/uL    Auto Basophil #: 0.07 K/uL    Auto Neutrophil %: 57.2: Differential percentages must be correlated with absolute numbers for  clinical significance. %    Auto Lymphocyte %: 27.4 %    Auto Monocyte %: 12.8 %    Auto Eosinophil %: 1.6 %    Auto Basophil %: 0.5 %    Auto Immature Granulocyte %: 0.5 %    Nucleated RBC: 0 /100 WBCs      Troponin T, Serum: 0.42: Critical value: called to and read back by CIARA Camargo at 08/16/20 17:49 ng/mL (08.16.20 @ 16:40)    Activated Partial Thromboplastin Time: 34.1 sec (08.16.20 @ 16:40)  Prothrombin Time, Plasma: 12.70 sec (08.16.20 @ 16:40)  INR: 1.10: Recommended ranges for therapeutic INR:    2.0-3.0 for most medical and surgical thromboembolic states    2.0-3.0 for atrial fibrillation    2.0-3.0 for bileaflet mechanical valve in aortic position    2.5-3.5 for mechanical heart valves    Chest 2004;[id number]  The presence of direct thrombin inhibitors (argatroban, refludan)  may falsely increase results. ratio (08.16.20 @ 16:40)    Comprehensive Metabolic Panel (08.16.20 @ 16:40)    Sodium, Serum: 140 mmol/L    Potassium, Serum: 4.1 mmol/L    Chloride, Serum: 100 mmol/L    Carbon Dioxide, Serum: 27 mmol/L    Anion Gap, Serum: 13 mmol/L    Blood Urea Nitrogen, Serum: 14 mg/dL    Creatinine, Serum: 1.3 mg/dL    Glucose, Serum: 137 mg/dL    Calcium, Total Serum: 10.1 mg/dL    Protein Total, Serum: 7.0 g/dL    Albumin, Serum: 4.3 g/dL    Bilirubin Total, Serum: 1.1 mg/dL    Alkaline Phosphatase, Serum: 77 U/L    Aspartate Aminotransferase (AST/SGOT): 45 U/L    Alanine Aminotransferase (ALT/SGPT): 25 U/L    eGFR if Non : 60: Interpretative comment  The units for eGFR are mL/min/1.73M2 (normalized body surface area). The  eGFR is calculated from a serum creatinine using the CKD-EPI equation.  Other variables required for calculation are race, age and sex. Among  patients with chronic kidney disease (CKD), the eGFR is useful in  determining the stage of disease according to KDOQI CKD classification.  All eGFR results are reported numerically with the following  interpretation.          GFR                    With                 Without     (ml/min/1.73 m2)    Kidney Damage       Kidney Damage        >= 90                    Stage 1                     Normal        60-89                    Stage 2                     Decreased GFR        30-59     Stage 3                     Stage 3        15-29                    Stage 4                     Stage 4        < 15                      Stage 5                     Stage 5  Each stage of CKD assumes that the associated GFR level has been in  effect for at least 3 months. Determination of stages one and two (with  eGFR > 59 ml/min/m2) requires estimation of kidney damage for at least 3  months as defined by structural or functional abnormalities.  Limitations: All estimates of GFR will be less accurate for patients at  extremes of muscle mass (including but not limited to frail elderly,  critically ill, or cancer patients), those with unusual diets, and those  with conditions associated with reduced secretion or extrarenal  elimination of creatinine. The eGFR equation is not recommended for use  in patients with unstable creatinine levels. mL/min/1.73M2    eGFR if African American: 70 mL/min/1.73M2    Serum Pro-Brain Natriuretic Peptide: 2336 pg/mL (08.16.20 @ 16:40)

## 2020-08-16 NOTE — H&P ADULT - NSICDXFAMILYHX_GEN_ALL_CORE_FT
FAMILY HISTORY:  Grandparent  Still living? Unknown  FH: CAD (coronary artery disease), Age at diagnosis: Age Unknown

## 2020-08-16 NOTE — ED ADULT NURSE NOTE - NSIMPLEMENTINTERV_GEN_ALL_ED
Implemented All Universal Safety Interventions:  [place] to call system. Call bell, personal items and telephone within reach. Instruct patient to call for assistance. Room bathroom lighting operational. Non-slip footwear when patient is off stretcher. Physically safe environment: no spills, clutter or unnecessary equipment. Stretcher in lowest position, wheels locked, appropriate side rails in place.

## 2020-08-16 NOTE — ED ADULT NURSE NOTE - PMH
Cardiac abnormality    Chronic gastric ulcer without hemorrhage and without perforation    Chronic obstructive pulmonary disease    GERD (gastroesophageal reflux disease)    High cholesterol    Ulcer of esophagus

## 2020-08-16 NOTE — ED PROVIDER NOTE - PHYSICAL EXAMINATION
CONST: Well appearing in NAD  EYES: Sclera and conjunctiva clear.  CARD: Normal S1 S2; Normal rate and rhythm  RESP: Equal BS B/L, No wheezes, rhonchi or rales. No distress  GI: Soft, non-tender, non-distended, no RUQ abd pain, negative chakraborty's   MS: Normal ROM in all extremities. No edema of lower extremities, no calf pain, radial pulses 2+ bilaterally  SKIN: Warm, dry, no acute rashes. Good turgor  NEURO: A&Ox3, No focal deficits. Strength 5/5 with no sensory deficits. Steady gait

## 2020-08-16 NOTE — H&P ADULT - HISTORY OF PRESENT ILLNESS
60-year-old male, KTH: Hypertension, Type II DM, Dyslipidemia, CAD S/P LIMA CABG done 12 years ago. Presented with Chest pain to the [facility]. History goes back to yesterday when the patient had an episode of self resolving chest pain ( 10 minutes duration) which he contributed to maldigestion. Today at 3:00 p.m He had another episode of dull and sore chest pain, radiating to th right arm pit and shoulder. The pain was severe, and unrelieved by any medication. The patient reports increased in the dull pain with inspiration, but the pain does not seem to be pleuritic ( NOT sharp in nature).   The patient reports Nausea and vomiting with diaphoresis. He also reports one episode of low grade fever yesterday 101.     In the ER an EKG was done and it showed STEMI in leads V5-V6. The patient was given a loading dose of Aspirin and Brilinta in [facility], was started on a heparin drip and transferred directly to the our facility for the Cath Lab.      performed the Cardiac cath which showed:   a) Lesion in the Left Circumflux artery (almost complete stenosis)  b) Lesion in the left main (80%)

## 2020-08-16 NOTE — ED ADULT NURSE REASSESSMENT NOTE - NS ED NURSE REASSESS COMMENT FT1
pt being transferred [place] for cath lab. report was given to charge tony. pt leaving the unit via ambulance.

## 2020-08-16 NOTE — CONSULT NOTE ADULT - SUBJECTIVE AND OBJECTIVE BOX
HPI:  60 y.o male patient with PMH of CAD s/p CABG in 2008 (LIMA to LAD), DM, HTN, HLD, reflux esophagitis presented to the ED for chest pain.  History goes back to the day prior to presentation when the patient started complaining of chest pain on and off. Pain was attributed to his regular GI symptoms (patient had esophagitis in the past). However, on the day of presentation, patient states that he had severe chest pain, pressure-like, radiating to both his arms. He decided to present to the ED. He denies shortness of breath or any other significant symptoms.    Patient presented to the ED at [facility] where a STEMI code was called. I immediately responded to the code and contacted to the attending. EKG showed STEMI in lateral leads. Patient was given Aspirin 325mg and Plavix 600mg, started on heparin drip and transferred to the [facility] where he immediately taken to the cath lab for emergent revascularization      PAST MEDICAL & SURGICAL HISTORY  Ulcer of esophagus  Chronic gastric ulcer without hemorrhage and without perforation  High cholesterol  GERD (gastroesophageal reflux disease)  Cardiac abnormality  Chronic obstructive pulmonary disease  Cardiac arrhythmia: bypass surgery 9 years ago  Coronary artery disease involving other coronary artery bypass graft, angina presence unspecified      FAMILY HISTORY:  FAMILY HISTORY:  No pertinent family history in first degree relatives      SOCIAL HISTORY:  []smoker  []Alcohol  []Drug    ALLERGIES:  No Known Allergies      MEDICATIONS:  MEDICATIONS  (STANDING):  atorvastatin 80 milliGRAM(s) Oral at bedtime  sodium chloride 0.9%. 1000 milliLiter(s) (100 mL/Hr) IV Continuous <Continuous>    MEDICATIONS  (PRN):      HOME MEDICATIONS:  Home Medications:  Advair Diskus 100 mcg-50 mcg inhalation powder: 1 puff(s) inhaled 2 times a day (16 Aug 2020 16:34)  Aspir 81 oral delayed release tablet: 1 tab(s) orally once a day (16 Aug 2020 16:34)  Breo Ellipta 100 mcg-25 mcg/inh inhalation powder: 1 puff(s) inhaled once a day (16 Aug 2020 16:34)  Janumet 50 mg-500 mg oral tablet: 1 tab(s) orally 2 times a day (16 Aug 2020 16:34)  metoprolol succinate 25 mg oral tablet, extended release: 1 tab(s) orally once a day (16 Aug 2020 16:34)  NIFEdipine 10 mg oral capsule: 1 cap(s) orally 3 times a day (16 Aug 2020 16:34)  Paxil 10 mg oral tablet: 1 tab(s) orally once a day (16 Aug 2020 16:34)  Plavix 75 mg oral tablet: 1 tab(s) orally once a day (16 Aug 2020 16:34)  pravastatin:  (16 Aug 2020 16:34)  Proventil 2.5 mg/3 mL (0.083%) inhalation solution: 3 milliliter(s) inhaled every 6 hours (16 Aug 2020 16:34)  valsartan 40 mg oral tablet: 1 tab(s) orally 2 times a day (16 Aug 2020 16:34)      VITALS:   T(F): 99 (08-16 @ 19:15), Max: 99 (08-16 @ 19:15)  HR: 88 (08-16 @ 19:15) (62 - 88)  BP: 135/95 (08-16 @ 19:15) (130/76 - 147/82)  BP(mean): 109 (08-16 @ 19:15) (109 - 109)  RR: 20 (08-16 @ 19:15) (18 - 20)  SpO2: 95% (08-16 @ 19:15) (95% - 98%)    I&O's Summary      REVIEW OF SYSTEMS:  CONSTITUTIONAL: No weakness, fevers or chills  EYES: No visual changes  ENT: No vertigo or throat pain   NECK: No pain or stiffness  RESPIRATORY: No cough, wheezing, hemoptysis; No shortness of breath  CARDIOVASCULAR: Chest pain as described in HPI  GASTROINTESTINAL: No abdominal or epigastric pain. No nausea, vomiting, or hematemesis; No diarrhea or constipation. No melena or hematochezia.  GENITOURINARY: No dysuria, frequency or hematuria  NEUROLOGICAL: No numbness or weakness  SKIN: No itching, no rashes  MSK: No pain    PHYSICAL EXAM:  NEURO: patient is awake , alert and oriented  GEN: Not in acute distress  NECK: no thyroid enlargement, no JVD  LUNGS: Clear to auscultation bilaterally   CARDIOVASCULAR: S1/S2 present, RRR  ABD: Soft, non-tender, non-distended  EXT: No LAURA  SKIN: Intact    LABS:                        13.7   15.24 )-----------( 395      ( 16 Aug 2020 16:40 )             44.1     08-16    140  |  100  |  14  ----------------------------<  137<H>  4.1   |  27  |  1.3    Ca    10.1      16 Aug 2020 16:40  Mg     2.2     08-16    TPro  7.0  /  Alb  4.3  /  TBili  1.1  /  DBili  x   /  AST  45<H>  /  ALT  25  /  AlkPhos  77  08-16    PT/INR - ( 16 Aug 2020 16:40 )   PT: 12.70 sec;   INR: 1.10 ratio         PTT - ( 16 Aug 2020 16:40 )  PTT:34.1 sec  Troponin T, Serum: 0.42 ng/mL <HH> (08-16-20 @ 16:40)  Lactate, Blood: 2.6 mmol/L <H> (08-16-20 @ 16:40)    CARDIAC MARKERS ( 16 Aug 2020 16:40 )  x     / 0.42 ng/mL / x     / x     / x        Troponin trend:    Serum Pro-Brain Natriuretic Peptide: 2336 pg/mL (08-16-20 @ 16:40)      RADIOLOGY:  -CXR:  -TTE:  -CCTA:  -STRESS TEST:  -CATHETERIZATION:    ECG: STEMI in lateral leads    TELEMETRY EVENTS:

## 2020-08-16 NOTE — ED PROVIDER NOTE - OBJECTIVE STATEMENT
60 y.o male w/ hx of DM, esophageal ulcers, HTN, HLD, bypass x 2 presents to the ED for evaluation of chest pressure x 2 day.  Today developed right sided chest pressure which radiates down R arm, worse w/ exertion.  Associated w/ nausea, vomiting and diaphoresis.  No associated dyspnea, abd pain, back pain, paresthesias, cough.  Admits to recent travel to [place] and temp 100 yesterday.  Pt adds no other complaints at this time.  Cardiologist Dr. Carmona.  No recent cardiac workup in past yr.

## 2020-08-16 NOTE — CONSULT NOTE ADULT - ASSESSMENT
60 y.o male patient with PMH of CAD s/p CABG in 2008 (LIMA to LAD), DM, HTN, HLD, reflux esophagitis presented to the ED for chest pain; found to have STEMI    # STEMI in lateral leads  - PCI to circumflex (culprit) as well as PCI to left main done with no complications  - Admit to CCU  - Aspirin 81mg, Brilinta 90mg q12h, metoprolol tartrate 25mg q12h  - Since creatinine increased (was 0.8 in 2/2019 and now 1.3), hold ACE-I for now; start as soon as renal function stabilizes and as tolerated  - Check 2D echo  - PPI for GI prophylaxis  - DVT prophylaxis  - Will follow

## 2020-08-16 NOTE — CONSULT NOTE ADULT - ASSESSMENT
60 y.o male patient with PMH of CAD s/p CABG in 2008 (LIMA to LAD), DM, HTN, HLD, reflux esophagitis presented to the ED for chest pain; found to have STEMI    # STEMI in lateral leads  - Patient received aspirin 325mg, Plavix 600mg and started on heparin drip  - PCI to circumflex (culprit) as well as PCI to left main done with no complications  - Admit to CCU  - Aspirin 81mg, Brilinta 90mg q12h (patient already received bolus of 180mg), metoprolol tartrate 25mg q12h  - Since creatinine increased (was 0.8 in 2/2019 and now 1.3), hold ACE-I for now; start as soon as renal function stabilizes and as tolerated  - IVF (NS 100cc/hr for 1L total)  - Check 2D echo 60 y.o male patient with PMH of CAD s/p CABG in 2008 (LIMA to LAD), DM, HTN, HLD, reflux esophagitis presented to the ED for chest pain; found to have STEMI    # STEMI in lateral leads  - Patient received aspirin 325mg, Plavix 600mg and started on heparin drip  - PCI to circumflex (culprit) as well as PCI to left main done with no complications  - Admit to CCU  - Aspirin 81mg, Brilinta 90mg q12h (patient already received bolus of 180mg), metoprolol tartrate 25mg q12h  - Since creatinine increased (was 0.8 in 2/2019 and now 1.3), hold ACE-I for now; start as soon as renal function stabilizes and as tolerated  - IVF (NS 100cc/hr for 1L total)  - Check 2D echo  - PPI for GI prophylaxis  - DVT prophylaxis

## 2020-08-16 NOTE — H&P ADULT - ATTENDING COMMENTS
Patient declined need to contact wife at this hour.    PHYSICAL EXAM:    CONSTITUTIONAL: NAD  ENMT: EOMI, PERRLA, No tonsillar erythema, exudates, or enlargement, neck supple, No JVD  PSYCH: Alert & Oriented X3  RESPIRATORY: Clear to percussion bilaterally; No rales, rhonchi, wheezing, or rubs  CARDIOVASCULAR: Regular rate and rhythm; No murmurs, rubs, or gallops, negative edema  GASTROINTESTINAL: Soft, Nontender, Nondistended; Bowel sounds present  EXTREMITIES:  2+ Peripheral Pulses, No clubbing, cyanosis  SKIN: No rashes or lesions, dressing over right groin clean, dry and intact     59 yo M with PMHx of CAD s/p CABG, DM II, HTN, HLD, GERD, PUD, Reflux Esophagitis presented with complaint of bilateral chest wall burning occurring 2 days ago, which patient attributed to GERD, patient then experienced in the past 2 days right sided, sharp, intermittent, radiating down right upper extremity and then across the chest down left upper extremity chest pain associated with shortness of breath and diaphoresis. Patient awoke feeling "sluggish" on morning of presentation with return of chest pain thereby prompting ED visit, was initially seen at [facility], transferred North s/p STEMI findings on EKG. ROS significant for TMax of 100.1F taken yesterday evening, was in [place] recently for 5 days for nephew's . Patient states he has not followed up with Cardiologist, Dr. Reddy, did not schedule stress test and routine blood work.    #STEMI s/p PCI, found to have significant triple vessel coronary artery disease and left main disease: continue CCU monitoring, continue DAPT, beta blocker and high intensity statin, continue gentle IVF hydration, f/u 2D-Echo    #HTN/DM II: continue antihypertensives, monitor blood pressures, monitor fingersticks, basal bolus insulin if greater than 180    Patient is full code.    Disposition: Acute

## 2020-08-16 NOTE — H&P ADULT - NSHPPHYSICALEXAM_GEN_ALL_CORE
General: He look comfortable and reports decrease in the pain   HEENT: well injected conjunctiva, anicteric sclera   Lungs: GBAE  Heart: RRR, normal S1S2  Abdomen: +BS, soft, nontender  LL: No LL edema   Right Femoral artery access. Site of access has been examined. No pain, no oozing.

## 2020-08-16 NOTE — CONSULT NOTE ADULT - SUBJECTIVE AND OBJECTIVE BOX
HPI:  60 y.o male patient with PMH of CAD s/p CABG in 2008 (LIMA to LAD), DM, HTN, HLD, reflux esophagitis presented to the ED for chest pain.  History goes back to the day prior to presentation when the patient started complaining of chest pain on and off. Pain was attributed to his regular GI symptoms (patient had esophagitis in the past). However, on the day of presentation, patient states that he had severe chest pain, pressure-like, radiating to both his arms. He decided to present to the ED. He denies shortness of breath or any other significant symptoms.    Patient presented to the ED at [facility] where a STEMI code was called. I immediately responded to the code and contacted to the attending. EKG showed STEMI in lateral leads. Patient was given Aspirin 325mg and Plavix 600mg, started on heparin drip and transferred to the [facility] where he immediately taken to the cath lab for emergent revascularization    PAST MEDICAL & SURGICAL HISTORY  Ulcer of esophagus  Chronic gastric ulcer without hemorrhage and without perforation  High cholesterol  GERD (gastroesophageal reflux disease)  Cardiac abnormality  Chronic obstructive pulmonary disease  Cardiac arrhythmia: bypass surgery 9 years ago  Coronary artery disease involving other coronary artery bypass graft, angina presence unspecified      FAMILY HISTORY:  FAMILY HISTORY:  No pertinent family history in first degree relatives      SOCIAL HISTORY:  []smoker  []Alcohol  []Drug    ALLERGIES:  No Known Allergies      MEDICATIONS:  MEDICATIONS  (STANDING):  atorvastatin 80 milliGRAM(s) Oral at bedtime  sodium chloride 0.9%. 1000 milliLiter(s) (100 mL/Hr) IV Continuous <Continuous>    MEDICATIONS  (PRN):      HOME MEDICATIONS:  Home Medications:  Advair Diskus 100 mcg-50 mcg inhalation powder: 1 puff(s) inhaled 2 times a day (16 Aug 2020 16:34)  Aspir 81 oral delayed release tablet: 1 tab(s) orally once a day (16 Aug 2020 16:34)  Breo Ellipta 100 mcg-25 mcg/inh inhalation powder: 1 puff(s) inhaled once a day (16 Aug 2020 16:34)  Janumet 50 mg-500 mg oral tablet: 1 tab(s) orally 2 times a day (16 Aug 2020 16:34)  metoprolol succinate 25 mg oral tablet, extended release: 1 tab(s) orally once a day (16 Aug 2020 16:34)  NIFEdipine 10 mg oral capsule: 1 cap(s) orally 3 times a day (16 Aug 2020 16:34)  Paxil 10 mg oral tablet: 1 tab(s) orally once a day (16 Aug 2020 16:34)  Plavix 75 mg oral tablet: 1 tab(s) orally once a day (16 Aug 2020 16:34)  pravastatin:  (16 Aug 2020 16:34)  Proventil 2.5 mg/3 mL (0.083%) inhalation solution: 3 milliliter(s) inhaled every 6 hours (16 Aug 2020 16:34)  valsartan 40 mg oral tablet: 1 tab(s) orally 2 times a day (16 Aug 2020 16:34)      VITALS:   T(F): 99 (08-16 @ 19:15), Max: 99 (08-16 @ 19:15)  HR: 88 (08-16 @ 19:15) (62 - 88)  BP: 135/95 (08-16 @ 19:15) (130/76 - 147/82)  BP(mean): 109 (08-16 @ 19:15) (109 - 109)  RR: 20 (08-16 @ 19:15) (18 - 20)  SpO2: 95% (08-16 @ 19:15) (95% - 98%)    I&O's Summary      REVIEW OF SYSTEMS:  CONSTITUTIONAL: No weakness, fevers or chills  EYES: No visual changes  ENT: No vertigo or throat pain   NECK: No pain or stiffness  RESPIRATORY: No cough, wheezing, hemoptysis; No shortness of breath  CARDIOVASCULAR: Chest pain as described in HPI  GASTROINTESTINAL: History of esophagitis. No abdominal or epigastric pain. No nausea, vomiting, or hematemesis; No diarrhea or constipation. No melena or hematochezia.  GENITOURINARY: No dysuria, frequency or hematuria  NEUROLOGICAL: No numbness or weakness  SKIN: No itching, no rashes  MSK: No pain    PHYSICAL EXAM:  NEURO: patient is awake , alert and oriented  GEN: Not in acute distress  NECK: no thyroid enlargement, no JVD  LUNGS: Clear to auscultation bilaterally   CARDIOVASCULAR: S1/S2 present, RRR  ABD: Soft, non-tender, non-distended  EXT: No LAURA  SKIN: Intact    LABS:                        13.7   15.24 )-----------( 395      ( 16 Aug 2020 16:40 )             44.1     08-16    140  |  100  |  14  ----------------------------<  137<H>  4.1   |  27  |  1.3    Ca    10.1      16 Aug 2020 16:40  Mg     2.2     08-16    TPro  7.0  /  Alb  4.3  /  TBili  1.1  /  DBili  x   /  AST  45<H>  /  ALT  25  /  AlkPhos  77  08-16    PT/INR - ( 16 Aug 2020 16:40 )   PT: 12.70 sec;   INR: 1.10 ratio         PTT - ( 16 Aug 2020 16:40 )  PTT:34.1 sec  Troponin T, Serum: 0.42 ng/mL <HH> (08-16-20 @ 16:40)  Lactate, Blood: 2.6 mmol/L <H> (08-16-20 @ 16:40)    CARDIAC MARKERS ( 16 Aug 2020 16:40 )  x     / 0.42 ng/mL / x     / x     / x        Troponin trend:    Serum Pro-Brain Natriuretic Peptide: 2336 pg/mL (08-16-20 @ 16:40)          RADIOLOGY:  -CXR:  -TTE:  -CCTA:  -STRESS TEST:  -CATHETERIZATION:    ECG: STEMI in lateral leads    TELEMETRY EVENTS:

## 2020-08-16 NOTE — H&P ADULT - NSICDXPASTSURGICALHX_GEN_ALL_CORE_FT
PAST SURGICAL HISTORY:  Cardiac arrhythmia bypass surgery 9 years ago    Coronary artery disease involving other coronary artery bypass graft, angina presence unspecified

## 2020-08-16 NOTE — ED PROVIDER NOTE - NS ED ROS FT
Constitutional: + diaphoresis. See HPI.  Eyes: No visual changes, eye pain or discharge.   ENMT: No hearing changes, pain, discharge or infections.   Cardiac: + chest pain. No SOB or edema.   Respiratory: No cough or respiratory distress.   GI: + nausea, + vomiting. No diarrhea or abdominal pain.  : No dysuria, frequency or burning. No Discharge  MS: No myalgia, muscle weakness, joint pain or back pain.  Neuro: No headache or weakness.  Skin: No skin rash.  Except as documented in the HPI, all other systems are negative.

## 2020-08-16 NOTE — H&P ADULT - NSICDXPASTMEDICALHX_GEN_ALL_CORE_FT
PAST MEDICAL HISTORY:  Cardiac abnormality     Chronic gastric ulcer without hemorrhage and without perforation     Chronic obstructive pulmonary disease     GERD (gastroesophageal reflux disease)     High cholesterol     Ulcer of esophagus

## 2020-08-16 NOTE — ED ADULT NURSE NOTE - ALCOHOL PRE SCREEN (AUDIT - C)
Statement Selected [Mother] : mother [Breast milk] : breast milk [Formula ___ oz/feed] : [unfilled] oz of formula per feed [Normal] : Normal [No] : No cigarette smoke exposure [Water heater temperature set at <120 degrees F] : Water heater temperature set at <120 degrees F [Car seat in back seat] : No car seat in back seat [Smoke Detectors] : Smoke detectors [Gun in Home] : Gun in home [Carbon Monoxide Detectors] : Carbon monoxide detectors [Up to date] : Up to date [Sippy cup use] : Sippy cup use [Playtime] : Playtime  [Exposure to electronic nicotine delivery system] : No exposure to electronic nicotine delivery system [At risk for exposure to lead] : Not at risk for exposure to lead  [At risk for exposure to TB] : Not at risk for exposure to Tuberculosis [FreeTextEntry7] : 12 month well visit [FluorideSource] : vitamin

## 2020-08-16 NOTE — CHART NOTE - NSCHARTNOTEFT_GEN_A_CORE
Preliminary Cardiac Catheterization Post-Procedure Report    PRE-OP DIAGNOSIS: STEMI    PROCEDURE: Coronary angiogram, PCI    Physician: Dr. Pelaez   Assistant: Dr. Turk, Dr. Marquez     ANESTHESIA TYPE:  [  ]General Anesthesia  [  ] Sedation  [ x ] Local/Regional    ESTIMATED BLOOD LOSS:   < 10 mL    CONDITION  [  ] Critical  [  ] Serious  [  ]Fair  [  x]Good    ACCESS & HEMOSTASIS  [  ] Right radial   [ x ] Right femoral -> angioseal   [  ] Left radial  [  ] Left femoral       FINDINGS    Hemodynamics: Hemodynamic assessment demonstrates moderately elevated LVEDP.     Coronary circulation: The coronary circulation is right dominant. There was significant left main disease. There was significant 3-vessel coronary artery disease (LAD, RCA, and circumflex).     Left main: The vessel was medium to large sized. There was a discrete 80 % stenosis at a site with no prior intervention, at the ostium of the vessel segment. There was KYA grade 3 flow through the vessel (brisk flow). An intervention was performed.     LAD: The vessel was medium sized. Proximal LAD: Angiography showed severe atherosclerosis. There was a discrete 90 % stenosis at the ostium of the vessel segment. In a second lesion, there was a tubular 90 % stenosis. Mid LAD: There was a 100 % stenosis. This lesion is a chronic total occlusion. The artery was supplied by retrograde flow from a graft. Distal LAD: Angiography showed mild atherosclerosis with no flow limiting lesions. The artery was supplied by a patent bypass graft. 1st diagonal: The vessel was small sized. Angiography showed mild atherosclerosis with no flow limiting lesions.     Circumflex: The vessel was medium sized. Proximal circumflex: Angiography showed moderate atherosclerosis. There was a 100 % stenosis at a site with no prior intervention, just before OM1. The lesion was associated with a large filling defect consistent with thrombus. There was KYA grade 0 flow through the vessel (no flow). This is a likely culprit for the patient's clinical presentation. An intervention was performed. Distal circumflex: The vessel was small sized. Angiography showed moderate atherosclerosis. 1st obtuse marginal: The vessel was small sized. Angiography showed mild atherosclerosis with no flow limiting lesions. 2nd obtuse marginal: The vessel was medium to large sized. There was a tubular 50 % stenosis at the ostium of the vessel segment.     RCA: The vessel was medium sized. Proximal RCA: There was a tubular 50 % stenosis. Mid RCA: Angiography showed severe atherosclerosis. There was a diffuse 70- 80 % stenosis. Distal RCA: Angiography showed severe atherosclerosis. There was a 100 % stenosis. This lesion is a chronic total occlusion. Right PDA: The artery was supplied by collaterals from left. Graft to the distal LAD: The graft was a medium sized LIMA. Graft angiography showed no evidence of disease.       INTERVENTION/ IMPLANTS: IVONE x 1 to prox LCX. cutting balloon angioplasty and IVONE x 1 to LM.       POST-OP DIAGNOSIS    There is significant triple vessel coronary artery disease and left main disease.  of distal RCA. Patent LIMA graft to LAD. Successful PCI of proximal LCX into OM2 (culprit vessel) and severe LM disease.          PLAN OF CARE  CCU  cont medical therapy including DAPT, BB, statin  IV hydration  f/u BUN/ Cr  check lipids, HBA1C, ECHO  hold metformin x 48 hrs  DVT/ GI prophylaxis Preliminary Cardiac Catheterization Post-Procedure Report    PRE-OP DIAGNOSIS: STEMI    PROCEDURE: Coronary angiogram, PCI    Physician: Dr. Pelaez   Assistant: Dr. Turk, Dr. Marquez     ANESTHESIA TYPE:  [  ]General Anesthesia  [  ] Sedation  [ x ] Local/Regional    ESTIMATED BLOOD LOSS:   < 10 mL    CONDITION  [  ] Critical  [  ] Serious  [  ]Fair  [  x]Good    ACCESS & HEMOSTASIS  [  ] Right radial   [ x ] Right femoral -> angioseal   [  ] Left radial  [  ] Left femoral       FINDINGS    Hemodynamics: Hemodynamic assessment demonstrates moderately elevated LVEDP.     Coronary circulation: The coronary circulation is right dominant. There was significant left main disease. There was significant 3-vessel coronary artery disease (LAD, RCA, and circumflex).     Left main: The vessel was medium to large sized. There was a discrete 80 % stenosis at a site with no prior intervention, at the ostium of the vessel segment. There was KYA grade 3 flow through the vessel (brisk flow). An intervention was performed.     LAD: The vessel was medium sized. Proximal LAD: Angiography showed severe atherosclerosis. There was a discrete 90 % stenosis at the ostium of the vessel segment. In a second lesion, there was a tubular 90 % stenosis. Mid LAD: There was a 100 % stenosis. This lesion is a chronic total occlusion. The artery was supplied by retrograde flow from a graft. Distal LAD: Angiography showed mild atherosclerosis with no flow limiting lesions. The artery was supplied by a patent bypass graft. 1st diagonal: The vessel was small sized. Angiography showed mild atherosclerosis with no flow limiting lesions.     Circumflex: The vessel was medium sized. Proximal circumflex: Angiography showed moderate atherosclerosis. There was a 100 % stenosis at a site with no prior intervention, just before OM1. The lesion was associated with a large filling defect consistent with thrombus. There was KYA grade 0 flow through the vessel (no flow). This is a likely culprit for the patient's clinical presentation. An intervention was performed. Distal circumflex: The vessel was small sized. Angiography showed moderate atherosclerosis. 1st obtuse marginal: The vessel was small sized. Angiography showed mild atherosclerosis with no flow limiting lesions. 2nd obtuse marginal: The vessel was medium to large sized. There was a tubular 50 % stenosis at the ostium of the vessel segment.     RCA: The vessel was medium sized. Proximal RCA: There was a tubular 50 % stenosis. Mid RCA: Angiography showed severe atherosclerosis. There was a diffuse 70- 80 % stenosis. Distal RCA: Angiography showed severe atherosclerosis. There was a 100 % stenosis. This lesion is a chronic total occlusion. Right PDA: The artery was supplied by collaterals from left. Graft to the distal LAD: The graft was a medium sized LIMA. Graft angiography showed no evidence of disease.       INTERVENTION/ IMPLANTS: IVONE x 1 to prox LCX. cutting balloon angioplasty and IVONE x 1 to LM.        POST-OP DIAGNOSIS    There is significant triple vessel coronary artery disease and left main disease.  of distal RCA. Patent LIMA graft to LAD. Successful PCI of proximal LCX into OM2 (culprit vessel) and severe LM disease.          PLAN OF CARE  CCU monitoring  cont medical therapy including DAPT, BB, statin  IV hydration  f/u BUN/ Cr  check lipids, HBA1C, ECHO  hold metformin x 48 hrs  DVT/ GI prophylaxis

## 2020-08-16 NOTE — ED PROVIDER NOTE - PROGRESS NOTE DETAILS
EKG notable for STEMI. Stemi called. Cardiology aware. aspirin, heparin and palvix given. SIgned out to DR. Fry at [facility] and transferred north. spoke w/ Dr. Marquez.  transfer north to cath lab.  admit ccu.  spoke w/ icu resident maxx.  aware of transfer

## 2020-08-16 NOTE — ED PROVIDER NOTE - CLINICAL SUMMARY MEDICAL DECISION MAKING FREE TEXT BOX
Patient presents with active chest pain. ekg + for stemi. stemi code called. Labs drawn. given plavix, aspirin and heparin started. Transferred to the [place] facility for catheterization.

## 2020-08-16 NOTE — H&P ADULT - ASSESSMENT
60-year-old, with multiple comorbidities, presented to [facility] with the complaint of chest pain. EKG showed ST elevation MI. Transferred to our facility to the cardiact cath lab under the care of Dr. Acevedo. Cardiac Cath showed. Stenosis in the proximal left circumflex and another lesion in the left main (80%), both of which were stented.     #STEMI:   - Cardiac Cath done by Dr. Bui  - 1st lesion: stenosis of the proximal left circumflex    - 2nd lesion: 80% stenosis of the left main   - Heparin Drip   - Dual Anti-platelet therapy   - Continue PPI's     #Hypertension:   - Continue with Nifedipine   - Continue with Valsartan     #Type II DM  - D/c Janumet for 48 hours post-catheter   - Insulin if needed

## 2020-08-17 PROBLEM — E78.00 PURE HYPERCHOLESTEROLEMIA, UNSPECIFIED: Chronic | Status: ACTIVE | Noted: 2019-02-18

## 2020-08-17 PROBLEM — K21.9 GASTRO-ESOPHAGEAL REFLUX DISEASE WITHOUT ESOPHAGITIS: Chronic | Status: ACTIVE | Noted: 2019-02-17

## 2020-08-17 PROBLEM — K25.7 CHRONIC GASTRIC ULCER WITHOUT HEMORRHAGE OR PERFORATION: Chronic | Status: ACTIVE | Noted: 2019-02-18

## 2020-08-17 LAB
A1C WITH ESTIMATED AVERAGE GLUCOSE RESULT: 6.2 % — HIGH (ref 4–5.6)
ALBUMIN SERPL ELPH-MCNC: 3.8 G/DL — SIGNIFICANT CHANGE UP (ref 3.5–5.2)
ALBUMIN SERPL ELPH-MCNC: 3.9 G/DL — SIGNIFICANT CHANGE UP (ref 3.5–5.2)
ALP SERPL-CCNC: 66 U/L — SIGNIFICANT CHANGE UP (ref 30–115)
ALP SERPL-CCNC: 71 U/L — SIGNIFICANT CHANGE UP (ref 30–115)
ALT FLD-CCNC: 117 U/L — HIGH (ref 0–41)
ALT FLD-CCNC: 84 U/L — HIGH (ref 0–41)
ANION GAP SERPL CALC-SCNC: 12 MMOL/L — SIGNIFICANT CHANGE UP (ref 7–14)
ANION GAP SERPL CALC-SCNC: 12 MMOL/L — SIGNIFICANT CHANGE UP (ref 7–14)
AST SERPL-CCNC: 288 U/L — HIGH (ref 0–41)
AST SERPL-CCNC: 681 U/L — HIGH (ref 0–41)
BASOPHILS # BLD AUTO: 0 K/UL — SIGNIFICANT CHANGE UP (ref 0–0.2)
BASOPHILS # BLD AUTO: 0.05 K/UL — SIGNIFICANT CHANGE UP (ref 0–0.2)
BASOPHILS NFR BLD AUTO: 0 % — SIGNIFICANT CHANGE UP (ref 0–1)
BASOPHILS NFR BLD AUTO: 0.3 % — SIGNIFICANT CHANGE UP (ref 0–1)
BILIRUB SERPL-MCNC: 1.1 MG/DL — SIGNIFICANT CHANGE UP (ref 0.2–1.2)
BILIRUB SERPL-MCNC: 2 MG/DL — HIGH (ref 0.2–1.2)
BUN SERPL-MCNC: 12 MG/DL — SIGNIFICANT CHANGE UP (ref 10–20)
BUN SERPL-MCNC: 14 MG/DL — SIGNIFICANT CHANGE UP (ref 10–20)
CALCIUM SERPL-MCNC: 9 MG/DL — SIGNIFICANT CHANGE UP (ref 8.5–10.1)
CALCIUM SERPL-MCNC: 9.1 MG/DL — SIGNIFICANT CHANGE UP (ref 8.5–10.1)
CHLORIDE SERPL-SCNC: 100 MMOL/L — SIGNIFICANT CHANGE UP (ref 98–110)
CHLORIDE SERPL-SCNC: 105 MMOL/L — SIGNIFICANT CHANGE UP (ref 98–110)
CHOLEST SERPL-MCNC: 226 MG/DL — HIGH (ref 100–200)
CO2 SERPL-SCNC: 21 MMOL/L — SIGNIFICANT CHANGE UP (ref 17–32)
CO2 SERPL-SCNC: 23 MMOL/L — SIGNIFICANT CHANGE UP (ref 17–32)
CREAT SERPL-MCNC: 0.9 MG/DL — SIGNIFICANT CHANGE UP (ref 0.7–1.5)
CREAT SERPL-MCNC: 1 MG/DL — SIGNIFICANT CHANGE UP (ref 0.7–1.5)
EOSINOPHIL # BLD AUTO: 0 K/UL — SIGNIFICANT CHANGE UP (ref 0–0.7)
EOSINOPHIL # BLD AUTO: 0.01 K/UL — SIGNIFICANT CHANGE UP (ref 0–0.7)
EOSINOPHIL NFR BLD AUTO: 0 % — SIGNIFICANT CHANGE UP (ref 0–8)
EOSINOPHIL NFR BLD AUTO: 0.1 % — SIGNIFICANT CHANGE UP (ref 0–8)
ESTIMATED AVERAGE GLUCOSE: 131 MG/DL — HIGH (ref 68–114)
GLUCOSE BLDC GLUCOMTR-MCNC: 114 MG/DL — HIGH (ref 70–99)
GLUCOSE BLDC GLUCOMTR-MCNC: 115 MG/DL — HIGH (ref 70–99)
GLUCOSE BLDC GLUCOMTR-MCNC: 122 MG/DL — HIGH (ref 70–99)
GLUCOSE BLDC GLUCOMTR-MCNC: 126 MG/DL — HIGH (ref 70–99)
GLUCOSE SERPL-MCNC: 111 MG/DL — HIGH (ref 70–99)
GLUCOSE SERPL-MCNC: 117 MG/DL — HIGH (ref 70–99)
HCT VFR BLD CALC: 41.7 % — LOW (ref 42–52)
HCT VFR BLD CALC: 43.9 % — SIGNIFICANT CHANGE UP (ref 42–52)
HDLC SERPL-MCNC: 34 MG/DL — LOW
HGB BLD-MCNC: 13.1 G/DL — LOW (ref 14–18)
HGB BLD-MCNC: 13.5 G/DL — LOW (ref 14–18)
IMM GRANULOCYTES NFR BLD AUTO: 0.5 % — HIGH (ref 0.1–0.3)
LIPID PNL WITH DIRECT LDL SERPL: 155 MG/DL — HIGH (ref 4–129)
LYMPHOCYTES # BLD AUTO: 1.5 K/UL — SIGNIFICANT CHANGE UP (ref 1.2–3.4)
LYMPHOCYTES # BLD AUTO: 1.55 K/UL — SIGNIFICANT CHANGE UP (ref 1.2–3.4)
LYMPHOCYTES # BLD AUTO: 10 % — LOW (ref 20.5–51.1)
LYMPHOCYTES # BLD AUTO: 9.5 % — LOW (ref 20.5–51.1)
MCHC RBC-ENTMCNC: 26.2 PG — LOW (ref 27–31)
MCHC RBC-ENTMCNC: 26.4 PG — LOW (ref 27–31)
MCHC RBC-ENTMCNC: 30.8 G/DL — LOW (ref 32–37)
MCHC RBC-ENTMCNC: 31.4 G/DL — LOW (ref 32–37)
MCV RBC AUTO: 83.4 FL — SIGNIFICANT CHANGE UP (ref 80–94)
MCV RBC AUTO: 85.7 FL — SIGNIFICANT CHANGE UP (ref 80–94)
MONOCYTES # BLD AUTO: 1.8 K/UL — HIGH (ref 0.1–0.6)
MONOCYTES # BLD AUTO: 2.33 K/UL — HIGH (ref 0.1–0.6)
MONOCYTES NFR BLD AUTO: 11.6 % — HIGH (ref 1.7–9.3)
MONOCYTES NFR BLD AUTO: 14.8 % — HIGH (ref 1.7–9.3)
NEUTROPHILS # BLD AUTO: 11.8 K/UL — HIGH (ref 1.4–6.5)
NEUTROPHILS # BLD AUTO: 12.01 K/UL — HIGH (ref 1.4–6.5)
NEUTROPHILS NFR BLD AUTO: 74.8 % — SIGNIFICANT CHANGE UP (ref 42.2–75.2)
NEUTROPHILS NFR BLD AUTO: 77.5 % — HIGH (ref 42.2–75.2)
NRBC # BLD: 0 /100 WBCS — SIGNIFICANT CHANGE UP (ref 0–0)
PLATELET # BLD AUTO: 314 K/UL — SIGNIFICANT CHANGE UP (ref 130–400)
PLATELET # BLD AUTO: 347 K/UL — SIGNIFICANT CHANGE UP (ref 130–400)
POTASSIUM SERPL-MCNC: 3.9 MMOL/L — SIGNIFICANT CHANGE UP (ref 3.5–5)
POTASSIUM SERPL-MCNC: 4.3 MMOL/L — SIGNIFICANT CHANGE UP (ref 3.5–5)
POTASSIUM SERPL-SCNC: 3.9 MMOL/L — SIGNIFICANT CHANGE UP (ref 3.5–5)
POTASSIUM SERPL-SCNC: 4.3 MMOL/L — SIGNIFICANT CHANGE UP (ref 3.5–5)
PROT SERPL-MCNC: 6.5 G/DL — SIGNIFICANT CHANGE UP (ref 6–8)
PROT SERPL-MCNC: 6.6 G/DL — SIGNIFICANT CHANGE UP (ref 6–8)
RBC # BLD: 5 M/UL — SIGNIFICANT CHANGE UP (ref 4.7–6.1)
RBC # BLD: 5.12 M/UL — SIGNIFICANT CHANGE UP (ref 4.7–6.1)
RBC # FLD: 13.5 % — SIGNIFICANT CHANGE UP (ref 11.5–14.5)
RBC # FLD: 13.7 % — SIGNIFICANT CHANGE UP (ref 11.5–14.5)
SODIUM SERPL-SCNC: 135 MMOL/L — SIGNIFICANT CHANGE UP (ref 135–146)
SODIUM SERPL-SCNC: 138 MMOL/L — SIGNIFICANT CHANGE UP (ref 135–146)
TOTAL CHOLESTEROL/HDL RATIO MEASUREMENT: 6.6 RATIO — HIGH (ref 4–5.5)
TRIGL SERPL-MCNC: 130 MG/DL — SIGNIFICANT CHANGE UP (ref 10–149)
TROPONIN T SERPL-MCNC: 10.28 NG/ML — CRITICAL HIGH
TROPONIN T SERPL-MCNC: 6.77 NG/ML — CRITICAL HIGH
WBC # BLD: 15.49 K/UL — HIGH (ref 4.8–10.8)
WBC # BLD: 15.77 K/UL — HIGH (ref 4.8–10.8)
WBC # FLD AUTO: 15.49 K/UL — HIGH (ref 4.8–10.8)
WBC # FLD AUTO: 15.77 K/UL — HIGH (ref 4.8–10.8)

## 2020-08-17 PROCEDURE — 93306 TTE W/DOPPLER COMPLETE: CPT | Mod: 26

## 2020-08-17 PROCEDURE — 99233 SBSQ HOSP IP/OBS HIGH 50: CPT

## 2020-08-17 PROCEDURE — 93010 ELECTROCARDIOGRAM REPORT: CPT

## 2020-08-17 PROCEDURE — 71045 X-RAY EXAM CHEST 1 VIEW: CPT | Mod: 26

## 2020-08-17 RX ORDER — LOSARTAN POTASSIUM 100 MG/1
50 TABLET, FILM COATED ORAL DAILY
Refills: 0 | Status: DISCONTINUED | OUTPATIENT
Start: 2020-08-17 | End: 2020-08-18

## 2020-08-17 RX ORDER — LOSARTAN POTASSIUM 100 MG/1
25 TABLET, FILM COATED ORAL
Refills: 0 | Status: DISCONTINUED | OUTPATIENT
Start: 2020-08-17 | End: 2020-08-17

## 2020-08-17 RX ORDER — ACETAMINOPHEN 500 MG
650 TABLET ORAL ONCE
Refills: 0 | Status: COMPLETED | OUTPATIENT
Start: 2020-08-17 | End: 2020-08-17

## 2020-08-17 RX ORDER — TICAGRELOR 90 MG/1
1 TABLET ORAL
Qty: 60 | Refills: 0
Start: 2020-08-17 | End: 2020-09-15

## 2020-08-17 RX ORDER — METOPROLOL TARTRATE 50 MG
25 TABLET ORAL
Refills: 0 | Status: DISCONTINUED | OUTPATIENT
Start: 2020-08-17 | End: 2020-08-18

## 2020-08-17 RX ORDER — ONDANSETRON 8 MG/1
4 TABLET, FILM COATED ORAL ONCE
Refills: 0 | Status: COMPLETED | OUTPATIENT
Start: 2020-08-17 | End: 2020-08-17

## 2020-08-17 RX ORDER — ACETAMINOPHEN 500 MG
325 TABLET ORAL ONCE
Refills: 0 | Status: COMPLETED | OUTPATIENT
Start: 2020-08-17 | End: 2020-08-17

## 2020-08-17 RX ADMIN — TICAGRELOR 90 MILLIGRAM(S): 90 TABLET ORAL at 18:18

## 2020-08-17 RX ADMIN — Medication 25 MILLIGRAM(S): at 18:21

## 2020-08-17 RX ADMIN — Medication 10 MILLIGRAM(S): at 11:40

## 2020-08-17 RX ADMIN — PANTOPRAZOLE SODIUM 40 MILLIGRAM(S): 20 TABLET, DELAYED RELEASE ORAL at 06:19

## 2020-08-17 RX ADMIN — Medication 650 MILLIGRAM(S): at 01:20

## 2020-08-17 RX ADMIN — Medication 25 MILLIGRAM(S): at 06:18

## 2020-08-17 RX ADMIN — Medication 81 MILLIGRAM(S): at 11:40

## 2020-08-17 RX ADMIN — Medication 650 MILLIGRAM(S): at 00:48

## 2020-08-17 RX ADMIN — ATORVASTATIN CALCIUM 80 MILLIGRAM(S): 80 TABLET, FILM COATED ORAL at 21:16

## 2020-08-17 RX ADMIN — LOSARTAN POTASSIUM 50 MILLIGRAM(S): 100 TABLET, FILM COATED ORAL at 18:17

## 2020-08-17 RX ADMIN — Medication 10 MILLIGRAM(S): at 14:32

## 2020-08-17 RX ADMIN — CHLORHEXIDINE GLUCONATE 1 APPLICATION(S): 213 SOLUTION TOPICAL at 06:00

## 2020-08-17 RX ADMIN — Medication 325 MILLIGRAM(S): at 21:06

## 2020-08-17 RX ADMIN — ONDANSETRON 4 MILLIGRAM(S): 8 TABLET, FILM COATED ORAL at 05:45

## 2020-08-17 RX ADMIN — TICAGRELOR 90 MILLIGRAM(S): 90 TABLET ORAL at 06:18

## 2020-08-17 RX ADMIN — Medication 325 MILLIGRAM(S): at 20:36

## 2020-08-17 RX ADMIN — Medication 10 MILLIGRAM(S): at 06:18

## 2020-08-17 RX ADMIN — PANTOPRAZOLE SODIUM 40 MILLIGRAM(S): 20 TABLET, DELAYED RELEASE ORAL at 18:18

## 2020-08-17 NOTE — PROGRESS NOTE ADULT - ASSESSMENT
60-year-old, with multiple comorbidities, presented to [facility] with the complaint of chest pain. EKG showed ST elevation MI.    #STEMI, h/o CAD s/p CABG  - s/p C 08/16: significant 3V CAD and Lt Main Dx.  dRCA. Patent LIMA graft to LAD. PCI of LCx, and LM  - Cont ASA, Brillinta  - Cont metoprolol 25 BID  - Cont atorvastatin 80 qHs  - Should restart ACE-I/ARB, valsartan 40 qD    HTN/HLD  - Cont nifedipine IR 10 TID    DM2  - Takes Janumet  BID at home  - Currently not on insulin, monitor FS and restart if elevated    DVT PPX, SCD

## 2020-08-17 NOTE — PROGRESS NOTE ADULT - ASSESSMENT
60 y.o male patient with PMH of CAD s/p CABG in 2008 (LIMA to LAD), DM, HTN, HLD, reflux esophagitis presented to the ED for chest pain. EKG showed ST elevation MI.     #STEMI:    Aspirin 81mg, Brilinta 90mg q12h, metoprolol tartrate 25mg q12h, valsartan 40 mg BID.   -Echo pending read.      #Hypertension:   - Continue with Nifedipine   - Continue with Valsartan     #Type II DM  - D/c Janumet for 48 hours post-cath   - Insulin if needed     *PPI for GI prophylaxis  *DVT prophylaxis 60 y.o male patient with PMH of CAD s/p CABG in 2008 (LIMA to LAD), DM, HTN, HLD, reflux esophagitis presented to the ED for chest pain. EKG showed ST elevation MI.     #STEMI:   -Aspirin 81mg, Brilinta 90mg q12h, metoprolol tartrate 25mg q12h, atorvastatin 80 qHs  -Valsartan 40 BID not available at pharmacy so starting Losartan 25 BID as alternative.  -Echo pending read.      #Hypertension:   - Continue with Nifedipine     #Type II DM  - D/c Janumet for 48 hours post-cath   - Insulin if needed     *PPI for GI prophylaxis  *DVT prophylaxis SCD

## 2020-08-17 NOTE — PROGRESS NOTE ADULT - SUBJECTIVE AND OBJECTIVE BOX
Cardiology Follow up    KATH MCCOLLUM   60y Male  PAST MEDICAL & SURGICAL HISTORY:  Ulcer of esophagus  Chronic gastric ulcer without hemorrhage and without perforation  High cholesterol  GERD (gastroesophageal reflux disease)  Cardiac abnormality  Chronic obstructive pulmonary disease  Cardiac arrhythmia: bypass surgery 9 years ago  Coronary artery disease involving other coronary artery bypass graft, angina presence unspecified     HPI:  60-year-old male, KTH: Hypertension, Type II DM, Dyslipidemia, CAD S/P LIMA CABG done 12 years ago. Presented with Chest pain to the [facility]. History goes back to yesterday when the patient had an episode of self resolving chest pain ( 10 minutes duration) which he contributed to maldigestion. Today at 3:00 p.m He had another episode of dull and sore chest pain, radiating to th right arm pit and shoulder. The pain was severe, and unrelieved by any medication. The patient reports increased in the dull pain with inspiration, but the pain does not seem to be pleuritic ( NOT sharp in nature).   The patient reports Nausea and vomiting with diaphoresis. He also reports one episode of low grade fever yesterday 101.     In the ER an EKG was done and it showed STEMI in leads V5-V6. The patient was given a loading dose of Aspirin and Brilinta in [facility], was started on a heparin drip and transferred directly to the our facility for the Cath Lab.     Dr. Cardozo performed the Cardiac cath which showed:   a) Lesion in the Left Circumflux artery (almost complete stenosis)  b) Lesion in the left main (80%) (16 Aug 2020 19:52)    Allergies    No Known Allergies    Patient examined at bedside  Patient with c/o SOB at rest that resolves on its own.   Pt was OOB to chair without issues/symptoms  Denies CP, palpitations, or dizziness  9 beat NSVT overnight    Vital Signs Last 24 Hrs  T(C): 36.8 (17 Aug 2020 04:00), Max: 37.7 (17 Aug 2020 00:00)  T(F): 98.3 (17 Aug 2020 04:00), Max: 99.8 (17 Aug 2020 00:00)  HR: 82 (17 Aug 2020 08:00) (62 - 94)  BP: 120/74 (17 Aug 2020 08:00) (108/59 - 147/82)  BP(mean): 86 (17 Aug 2020 08:00) (76 - 109)  RR: 23 (17 Aug 2020 08:00) (10 - 26)  SpO2: 96% (17 Aug 2020 08:00) (92% - 98%)    MEDICATIONS  (STANDING):  ALBUTerol   0.042% 1.25 milliGRAM(s) Nebulizer four times a day  aspirin  chewable 81 milliGRAM(s) Oral daily  atorvastatin 80 milliGRAM(s) Oral at bedtime  chlorhexidine 4% Liquid 1 Application(s) Topical <User Schedule>  metoprolol succinate ER 25 milliGRAM(s) Oral daily  NIFEdipine IR 10 milliGRAM(s) Oral three times a day  pantoprazole    Tablet 40 milliGRAM(s) Oral two times a day  PARoxetine 10 milliGRAM(s) Oral daily  sodium chloride 0.9%. 1000 milliLiter(s) (100 mL/Hr) IV Continuous <Continuous>  ticagrelor 90 milliGRAM(s) Oral two times a day    MEDICATIONS  (PRN):    REVIEW OF SYSTEMS:          All negative except as mentioned in HPI    PHYSICAL EXAM:           CONSTITUTIONAL: Well-developed; well-nourished; in no acute distress  	SKIN: warm, dry  	HEAD: Normocephalic; atraumatic  	EYES: PERRL.  	ENT: No nasal discharge, airway clear, mucous membranes moist  	NECK: Supple; non tender.  	CARD: +S1, +S2, no murmurs, gallops, or rubs. Regular rate and rhythm    	RESP: No wheezes, rales or rhonchi. CTA B/L  	ABD: soft ntnd, + BS x 4 quadrants  	EXT: moves all extremities,  no clubbing, cyanosis or edema  	NEURO: Alert and oriented x3, no focal deficits          PSYCH: Cooperative, appropriate          VASCULAR:  + Rad / + PTs / + DPs          EXTREMITY:             Right Groin:   pressure dressing removed, access site soft, no hematoma, no pain, + pulses, no sign of infection, no numbness  	             ECG: < from: 12 Lead ECG (02.17.19 @ 19:38) >  Ventricular Rate 90 BPM    Atrial Rate 90 BPM    P-R Interval 140 ms    QRS Duration 108 ms    Q-T Interval 386 ms    QTC Calculation(Bezet) 472 ms    P Axis 30 degrees    R Axis -30 degrees    T Axis -21 degrees    Diagnosis Line Normal sinus rhythm  Left axis deviation  Moderate voltage criteria for LVH, may be normal variant  Nonspecific ST abnormality  Abnormal ECG                                                                                                     2D ECHO: pending    LABS:                        13.5   15.77 )-----------( 347      ( 17 Aug 2020 04:17 )             43.9     08-17    138  |  105  |  12  ----------------------------<  111<H>  4.3   |  21  |  0.9    Ca    9.0      17 Aug 2020 04:17  Mg     2.2     08-16    TPro  6.6  /  Alb  3.8  /  TBili  1.1  /  DBili  x   /  AST  681<H>  /  ALT  117<H>  /  AlkPhos  71  08-17    CARDIAC MARKERS ( 17 Aug 2020 04:17 )  x     / 10.28 ng/mL / x     / x     / x      CARDIAC MARKERS ( 16 Aug 2020 16:40 )  x     / 0.42 ng/mL / x     / x     / x          Magnesium, Serum: 2.2 mg/dL [1.8 - 2.4] (08-16-20 @ 16:40)  LIVER FUNCTIONS - ( 17 Aug 2020 04:17 )  Alb: 3.8 g/dL / Pro: 6.6 g/dL / ALK PHOS: 71 U/L / ALT: 117 U/L / AST: 681 U/L / GGT: x           A/P:  I discussed the case with Cardiologist Dr. Cardozo and recommend the following:    S/P PCI:    STEMI 8/16  POST-OP DIAGNOSIS: There is significant triple vessel coronary artery disease and left main disease.  of distal RCA. Patent LIMA graft to LAD. Successful PCI of proximal LCX into OM2 (culprit vessel) and severe LM disease.     INTERVENTION: IVONE x 1 to prox LCX. cutting balloon angioplasty and IVONE x 1 to LM    	     Continue DAPT ( Aspirin 81 mg PO Daily and Brilinta 90 po twice a day ),  B-Blocker, Statin Therapy                   Patient pharmacy called in for Brilinta prescription and it is $-- per month, patient aware and agreeable, Rx available for pickup                   F/U echo results                   F/U A1C results                   Keep K = 4, Mg = 2                   OOB ambulate with assistance                   Monitor access site                   Patient agreeing to take DAPT for at least one year or as directed by cardiologist                    Pt given instructions on importance of taking antiplatelet medication or risk acute stent thrombosis/death                   Post cath instructions, access site care and activity restrictions reviewed with patient                     Discussed with patient to return to hospital if experience chest pain, shortness breath, dizziness and site bleeding                   Aggressive risk factor modification, diet counseling, smoking cessation discussed with patient                       Monitor in CCU Cardiology Follow up    KATH MCCOLLUM   60y Male  PAST MEDICAL & SURGICAL HISTORY:  Ulcer of esophagus  Chronic gastric ulcer without hemorrhage and without perforation  High cholesterol  GERD (gastroesophageal reflux disease)  Cardiac abnormality  Chronic obstructive pulmonary disease  Cardiac arrhythmia: bypass surgery 9 years ago  Coronary artery disease involving other coronary artery bypass graft, angina presence unspecified     HPI:  60-year-old male, KTH: Hypertension, Type II DM, Dyslipidemia, CAD S/P LIMA CABG done 12 years ago. Presented with Chest pain to the [facility]. History goes back to yesterday when the patient had an episode of self resolving chest pain ( 10 minutes duration) which he contributed to maldigestion. Today at 3:00 p.m He had another episode of dull and sore chest pain, radiating to th right arm pit and shoulder. The pain was severe, and unrelieved by any medication. The patient reports increased in the dull pain with inspiration, but the pain does not seem to be pleuritic ( NOT sharp in nature).   The patient reports Nausea and vomiting with diaphoresis. He also reports one episode of low grade fever yesterday 101.     In the ER an EKG was done and it showed STEMI in leads V5-V6. The patient was given a loading dose of Aspirin and Brilinta in [facility], was started on a heparin drip and transferred directly to the our facility for the Cath Lab.     Dr. Cardozo performed the Cardiac cath which showed:   a) Lesion in the Left Circumflux artery (almost complete stenosis)  b) Lesion in the left main (80%) (16 Aug 2020 19:52)    Allergies    No Known Allergies    Patient examined at bedside  Patient with c/o SOB at rest that resolves on its own.   Pt was OOB to chair without issues/symptoms  Denies CP, palpitations, or dizziness  9 beat NSVT overnight    Vital Signs Last 24 Hrs  T(C): 36.8 (17 Aug 2020 04:00), Max: 37.7 (17 Aug 2020 00:00)  T(F): 98.3 (17 Aug 2020 04:00), Max: 99.8 (17 Aug 2020 00:00)  HR: 82 (17 Aug 2020 08:00) (62 - 94)  BP: 120/74 (17 Aug 2020 08:00) (108/59 - 147/82)  BP(mean): 86 (17 Aug 2020 08:00) (76 - 109)  RR: 23 (17 Aug 2020 08:00) (10 - 26)  SpO2: 96% (17 Aug 2020 08:00) (92% - 98%)    MEDICATIONS  (STANDING):  ALBUTerol   0.042% 1.25 milliGRAM(s) Nebulizer four times a day  aspirin  chewable 81 milliGRAM(s) Oral daily  atorvastatin 80 milliGRAM(s) Oral at bedtime  chlorhexidine 4% Liquid 1 Application(s) Topical <User Schedule>  metoprolol succinate ER 25 milliGRAM(s) Oral daily  NIFEdipine IR 10 milliGRAM(s) Oral three times a day  pantoprazole    Tablet 40 milliGRAM(s) Oral two times a day  PARoxetine 10 milliGRAM(s) Oral daily  sodium chloride 0.9%. 1000 milliLiter(s) (100 mL/Hr) IV Continuous <Continuous>  ticagrelor 90 milliGRAM(s) Oral two times a day    MEDICATIONS  (PRN):    REVIEW OF SYSTEMS:          All negative except as mentioned in HPI    PHYSICAL EXAM:           CONSTITUTIONAL: Well-developed; well-nourished; in no acute distress  	SKIN: warm, dry  	HEAD: Normocephalic; atraumatic  	EYES: PERRL.  	ENT: No nasal discharge, airway clear, mucous membranes moist  	NECK: Supple; non tender.  	CARD: +S1, +S2, no murmurs, gallops, or rubs. Regular rate and rhythm    	RESP: No wheezes, rales or rhonchi. CTA B/L  	ABD: soft ntnd, + BS x 4 quadrants  	EXT: moves all extremities,  no clubbing, cyanosis or edema  	NEURO: Alert and oriented x3, no focal deficits          PSYCH: Cooperative, appropriate          VASCULAR:  + Rad / + PTs / + DPs          EXTREMITY:             Right Groin:   pressure dressing removed, access site soft, no hematoma, no pain, + pulses, no sign of infection, no numbness  	             ECG: < from: 12 Lead ECG (02.17.19 @ 19:38) >  Ventricular Rate 90 BPM    Atrial Rate 90 BPM    P-R Interval 140 ms    QRS Duration 108 ms    Q-T Interval 386 ms    QTC Calculation(Bezet) 472 ms    P Axis 30 degrees    R Axis -30 degrees    T Axis -21 degrees    Diagnosis Line Normal sinus rhythm  Left axis deviation  Moderate voltage criteria for LVH, may be normal variant  Nonspecific ST abnormality  Abnormal ECG                                                                                                     2D ECHO: pending    LABS:                        13.5   15.77 )-----------( 347      ( 17 Aug 2020 04:17 )             43.9     08-17    138  |  105  |  12  ----------------------------<  111<H>  4.3   |  21  |  0.9    Ca    9.0      17 Aug 2020 04:17  Mg     2.2     08-16    TPro  6.6  /  Alb  3.8  /  TBili  1.1  /  DBili  x   /  AST  681<H>  /  ALT  117<H>  /  AlkPhos  71  08-17    CARDIAC MARKERS ( 17 Aug 2020 04:17 )  x     / 10.28 ng/mL / x     / x     / x      CARDIAC MARKERS ( 16 Aug 2020 16:40 )  x     / 0.42 ng/mL / x     / x     / x          Magnesium, Serum: 2.2 mg/dL [1.8 - 2.4] (08-16-20 @ 16:40)  LIVER FUNCTIONS - ( 17 Aug 2020 04:17 )  Alb: 3.8 g/dL / Pro: 6.6 g/dL / ALK PHOS: 71 U/L / ALT: 117 U/L / AST: 681 U/L / GGT: x           A/P:  I discussed the case with Cardiologist Dr. Cardozo and recommend the following:    S/P PCI:    STEMI 8/16  POST-OP DIAGNOSIS: There is significant triple vessel coronary artery disease and left main disease.  of distal RCA. Patent LIMA graft to LAD. Successful PCI of proximal LCX into OM2 (culprit vessel) and severe LM disease.     INTERVENTION: IVONE x 1 to prox LCX. cutting balloon angioplasty and IVONE x 1 to LM    	     Continue DAPT ( Aspirin 81 mg PO Daily and Brilinta 90 po twice a day ),  B-Blocker, Statin Therapy                   Patient pharmacy called in for Brilinta prescription , pharmacy needs insurance clarification from family, will f/u                    STOP Toprol XL 25mg                    START Metoprolol 25mg po q12h to titrate heart rate closer to 60                   ADD ACEi as BP tolerates                   GI prophylaxis                   F/U echo results                   F/U A1C results                   Keep K = 4, Mg = 2                   OOB ambulate with assistance                   Monitor access site                   Patient agreeing to take DAPT for at least one year or as directed by cardiologist                    Pt given instructions on importance of taking antiplatelet medication or risk acute stent thrombosis/death                   Post cath instructions, access site care and activity restrictions reviewed with patient                     Discussed with patient to return to hospital if experience chest pain, shortness breath, dizziness and site bleeding                   Aggressive risk factor modification, diet counseling, smoking cessation discussed with patient                       Monitor in CCU Cardiology Follow up    KATH MCCOLLUM   60y Male  PAST MEDICAL & SURGICAL HISTORY:  Ulcer of esophagus  Chronic gastric ulcer without hemorrhage and without perforation  High cholesterol  GERD (gastroesophageal reflux disease)  Cardiac abnormality  Chronic obstructive pulmonary disease  Cardiac arrhythmia: bypass surgery 9 years ago  Coronary artery disease involving other coronary artery bypass graft, angina presence unspecified     HPI:  60-year-old male, KTH: Hypertension, Type II DM, Dyslipidemia, CAD S/P LIMA CABG done 12 years ago. Presented with Chest pain to the [facility]. History goes back to yesterday when the patient had an episode of self resolving chest pain ( 10 minutes duration) which he contributed to maldigestion. Today at 3:00 p.m He had another episode of dull and sore chest pain, radiating to th right arm pit and shoulder. The pain was severe, and unrelieved by any medication. The patient reports increased in the dull pain with inspiration, but the pain does not seem to be pleuritic ( NOT sharp in nature).   The patient reports Nausea and vomiting with diaphoresis. He also reports one episode of low grade fever yesterday 101.     In the ER an EKG was done and it showed STEMI in leads V5-V6. The patient was given a loading dose of Aspirin and Brilinta in [facility], was started on a heparin drip and transferred directly to the our facility for the Cath Lab.     Dr. Cardozo performed the Cardiac cath which showed:   a) Lesion in the Left Circumflux artery (almost complete stenosis)  b) Lesion in the left main (80%) (16 Aug 2020 19:52)    Allergies    No Known Allergies    Patient examined at bedside  Patient with c/o SOB at rest that resolves on its own.   Pt was OOB to chair without issues/symptoms  Denies CP, palpitations, or dizziness  9 beat NSVT overnight    Vital Signs Last 24 Hrs  T(C): 36.8 (17 Aug 2020 04:00), Max: 37.7 (17 Aug 2020 00:00)  T(F): 98.3 (17 Aug 2020 04:00), Max: 99.8 (17 Aug 2020 00:00)  HR: 82 (17 Aug 2020 08:00) (62 - 94)  BP: 120/74 (17 Aug 2020 08:00) (108/59 - 147/82)  BP(mean): 86 (17 Aug 2020 08:00) (76 - 109)  RR: 23 (17 Aug 2020 08:00) (10 - 26)  SpO2: 96% (17 Aug 2020 08:00) (92% - 98%)    MEDICATIONS  (STANDING):  ALBUTerol   0.042% 1.25 milliGRAM(s) Nebulizer four times a day  aspirin  chewable 81 milliGRAM(s) Oral daily  atorvastatin 80 milliGRAM(s) Oral at bedtime  chlorhexidine 4% Liquid 1 Application(s) Topical <User Schedule>  metoprolol succinate ER 25 milliGRAM(s) Oral daily  NIFEdipine IR 10 milliGRAM(s) Oral three times a day  pantoprazole    Tablet 40 milliGRAM(s) Oral two times a day  PARoxetine 10 milliGRAM(s) Oral daily  sodium chloride 0.9%. 1000 milliLiter(s) (100 mL/Hr) IV Continuous <Continuous>  ticagrelor 90 milliGRAM(s) Oral two times a day    MEDICATIONS  (PRN):    REVIEW OF SYSTEMS:          All negative except as mentioned in HPI    PHYSICAL EXAM:           CONSTITUTIONAL: Well-developed; well-nourished; in no acute distress  	SKIN: warm, dry  	HEAD: Normocephalic; atraumatic  	EYES: PERRL.  	ENT: No nasal discharge, airway clear, mucous membranes moist  	NECK: Supple; non tender.  	CARD: +S1, +S2, no murmurs, gallops, or rubs. Regular rate and rhythm    	RESP: No wheezes, rales or rhonchi. CTA B/L  	ABD: soft ntnd, + BS x 4 quadrants  	EXT: moves all extremities,  no clubbing, cyanosis or edema  	NEURO: Alert and oriented x3, no focal deficits          PSYCH: Cooperative, appropriate          VASCULAR:  + Rad / + PTs / + DPs          EXTREMITY:             Right Groin:   pressure dressing removed, access site soft, no hematoma, no pain, + pulses, no sign of infection, no numbness  	             ECG: < from: 12 Lead ECG (02.17.19 @ 19:38) >  Ventricular Rate 90 BPM    Atrial Rate 90 BPM    P-R Interval 140 ms    QRS Duration 108 ms    Q-T Interval 386 ms    QTC Calculation(Bezet) 472 ms    P Axis 30 degrees    R Axis -30 degrees    T Axis -21 degrees    Diagnosis Line Normal sinus rhythm  Left axis deviation  Moderate voltage criteria for LVH, may be normal variant  Nonspecific ST abnormality  Abnormal ECG                                                                                                     2D ECHO: pending    LABS:                        13.5   15.77 )-----------( 347      ( 17 Aug 2020 04:17 )             43.9     08-17    138  |  105  |  12  ----------------------------<  111<H>  4.3   |  21  |  0.9    Ca    9.0      17 Aug 2020 04:17  Mg     2.2     08-16    TPro  6.6  /  Alb  3.8  /  TBili  1.1  /  DBili  x   /  AST  681<H>  /  ALT  117<H>  /  AlkPhos  71  08-17    CARDIAC MARKERS ( 17 Aug 2020 04:17 )  x     / 10.28 ng/mL / x     / x     / x      CARDIAC MARKERS ( 16 Aug 2020 16:40 )  x     / 0.42 ng/mL / x     / x     / x          Magnesium, Serum: 2.2 mg/dL [1.8 - 2.4] (08-16-20 @ 16:40)  LIVER FUNCTIONS - ( 17 Aug 2020 04:17 )  Alb: 3.8 g/dL / Pro: 6.6 g/dL / ALK PHOS: 71 U/L / ALT: 117 U/L / AST: 681 U/L / GGT: x           A/P:  I discussed the case with Cardiologist Dr. Cardozo and recommend the following:    S/P PCI:    STEMI 8/16  POST-OP DIAGNOSIS: There is significant triple vessel coronary artery disease and left main disease.  of distal RCA. Patent LIMA graft to LAD. Successful PCI of proximal LCX into OM2 (culprit vessel) and severe LM disease.     INTERVENTION: IVONE x 1 to prox LCX. cutting balloon angioplasty and IVONE x 1 to LM    	     Continue DAPT ( Aspirin 81 mg PO Daily and Brilinta 90 po twice a day ),  B-Blocker, Statin Therapy                   Patient pharmacy called in for Brilinta prescription , pharmacy needs insurance clarification from family, will f/u                    STOP Toprol XL 25mg                    START Metoprolol 25mg po q12h to titrate heart rate closer to 60                   HOLD metformin 48hrs post cath                   ADD ACEi as BP tolerates                   GI prophylaxis                   F/U echo results                   F/U A1C results                   Keep K = 4, Mg = 2                   OOB ambulate with assistance                   Monitor access site                   Patient agreeing to take DAPT for at least one year or as directed by cardiologist                    Pt given instructions on importance of taking antiplatelet medication or risk acute stent thrombosis/death                   Post cath instructions, access site care and activity restrictions reviewed with patient                     Discussed with patient to return to hospital if experience chest pain, shortness breath, dizziness and site bleeding                   Aggressive risk factor modification, diet counseling, smoking cessation discussed with patient                       Monitor in CCU

## 2020-08-17 NOTE — PROGRESS NOTE ADULT - SUBJECTIVE AND OBJECTIVE BOX
DAILY PROGRESS NOTE  ===========================================================    Patient Information:  KATH MCCOLLUM  /  60y  /  Male  /  MRN#: [id number]    Hospital Day: 1d     |:::::::::::::::::::::::::::| SUBJECTIVE |:::::::::::::::::::::::::::|    OVERNIGHT EVENTS:   TODAY: Patient was seen today at bedside. No acute overnight events. No complaints at this time. Review of systems is otherwise negative.    |:::::::::::::::::::::::::::| OBJECTIVE |:::::::::::::::::::::::::::|    VITAL SIGNS: Last 24 Hours  T(C): 36.9 (17 Aug 2020 12:00), Max: 37.7 (17 Aug 2020 00:00)  T(F): 98.5 (17 Aug 2020 12:00), Max: 99.8 (17 Aug 2020 00:00)  HR: 84 (17 Aug 2020 12:00) (62 - 94)  BP: 119/67 (17 Aug 2020 12:00) (108/59 - 147/82)  BP(mean): 62 (17 Aug 2020 12:00) (62 - 109)  RR: 29 (17 Aug 2020 12:00) (10 - 29)  SpO2: 97% (17 Aug 2020 12:00) (92% - 98%)    08-16-20 @ 07:01  -  08-17-20 @ 07:00  --------------------------------------------------------  IN: 1050 mL / OUT: 930 mL / NET: 120 mL      PHYSICAL EXAM:  GENERAL:   Awake, alert; NAD.  HEENT:  Head NC/AT; Conjunctivae pink, Sclera anicteric; Oral mucosa moist.  CARDIO:   Regular rate; Regular rhythm; S1 & S2.  RESP:   No rales, wheezing, or rhonchi appreciated.  GI:   Soft; NT/ND; BS; No guarding; No rebound tenderness.  EXT:    No edema.   SKIN:   Intact.    LAB RESULTS:                        13.5   15.77 )-----------( 347      ( 17 Aug 2020 04:17 )             43.9     08-17    138  |  105  |  12  ----------------------------<  111<H>  4.3   |  21  |  0.9    Ca    9.0      17 Aug 2020 04:17  Mg     2.2     08-16    TPro  6.6  /  Alb  3.8  /  TBili  1.1  /  DBili  x   /  AST  681<H>  /  ALT  117<H>  /  AlkPhos  71  08-17    PT/INR - ( 16 Aug 2020 16:40 )   PT: 12.70 sec;   INR: 1.10 ratio         PTT - ( 16 Aug 2020 16:40 )  PTT:34.1 sec      Troponin T, Serum: 10.28 ng/mL <HH> (08-17-20 @ 04:17)  Troponin T, Serum: 0.42 ng/mL <HH> (08-16-20 @ 16:40)  Lactate, Blood: 2.6 mmol/L <H> (08-16-20 @ 16:40)    CARDIAC MARKERS ( 17 Aug 2020 04:17 )  x     / 10.28 ng/mL / x     / x     / x      CARDIAC MARKERS ( 16 Aug 2020 16:40 )  x     / 0.42 ng/mL / x     / x     / x          MICROBIOLOGY:  None    RADIOLOGY:  Reviewed    ALLERGIES:  No Known Allergies      ===========================================================

## 2020-08-17 NOTE — PROGRESS NOTE ADULT - SUBJECTIVE AND OBJECTIVE BOX
CHUNKATH  60y, Male  Allergy: No Known Allergies    Hospital Day: 1d    Patient seen and examined earlier today. No acute events overnight.    PMH/PSH:  PAST MEDICAL & SURGICAL HISTORY:  Ulcer of esophagus  Chronic gastric ulcer without hemorrhage and without perforation  High cholesterol  GERD (gastroesophageal reflux disease)  Cardiac abnormality  Chronic obstructive pulmonary disease  Cardiac arrhythmia: bypass surgery 9 years ago  Coronary artery disease involving other coronary artery bypass graft, angina presence unspecified    VITALS:  T(F): 98.5 (08-17-20 @ 12:00), Max: 99.8 (08-17-20 @ 00:00)  HR: 84 (08-17-20 @ 12:00)  BP: 119/67 (08-17-20 @ 12:00) (108/59 - 147/82)  RR: 29 (08-17-20 @ 12:00)  SpO2: 97% (08-17-20 @ 12:00)    TESTS & MEASUREMENTS:  Weight (Kg): 96.2 (08-16-20 @ 16:31)  BMI (kg/m2): 33.2 (08-16)    08-16-20 @ 07:01  -  08-17-20 @ 07:00  --------------------------------------------------------  IN: 1050 mL / OUT: 930 mL / NET: 120 mL                        13.5   15.77 )-----------( 347      ( 17 Aug 2020 04:17 )             43.9     PT/INR - ( 16 Aug 2020 16:40 )   PT: 12.70 sec;   INR: 1.10 ratio       PTT - ( 16 Aug 2020 16:40 )  PTT:34.1 sec  08-17    138  |  105  |  12  ----------------------------<  111<H>  4.3   |  21  |  0.9    Ca    9.0      17 Aug 2020 04:17  Mg     2.2     08-16    TPro  6.6  /  Alb  3.8  /  TBili  1.1  /  DBili  x   /  AST  681<H>  /  ALT  117<H>  /  AlkPhos  71  08-17    LIVER FUNCTIONS - ( 17 Aug 2020 04:17 )  Alb: 3.8 g/dL / Pro: 6.6 g/dL / ALK PHOS: 71 U/L / ALT: 117 U/L / AST: 681 U/L / GGT: x           CARDIAC MARKERS ( 17 Aug 2020 04:17 )  x     / 10.28 ng/mL / x     / x     / x      CARDIAC MARKERS ( 16 Aug 2020 16:40 )  x     / 0.42 ng/mL / x     / x     / x        RECENT DIAGNOSTIC ORDERS:  Cardiac Cath Lab - Adult (08-16-20 @ 17:34)  Diet, DASH/TLC:   Sodium & Cholesterol Restricted     Special Instructions for Nursing:  Sodium & Cholesterol Restricted (08-16-20 @ 19:05)  Transthoracic Echocardiogram:   Transport: Portable  Monitor: w/ Monitor  Provider's Contact #: [phone] (08-16-20 @ 19:05)  COVID-19  Antibody - for prior infection screening: Routine (08-16-20 @ 20:17)  Activated Partial Thromboplastin Time:  Start Date:16-Aug-2020. STAT (08-16-20 @ 22:36)  Complete Blood Count: Repeat From: 29-Aug-2020 00:00 To: 31-Aug-2020 04:30, Every 1 day(s) (08-16-20 @ 22:36)  Complete Blood Count: Repeat From: 26-Aug-2020 00:00 To: 28-Aug-2020 04:30, Every 1 day(s) (08-16-20 @ 22:36)  Complete Blood Count: Repeat From: 23-Aug-2020 00:00 To: 25-Aug-2020 04:30, Every 1 day(s) (08-16-20 @ 22:36)  Complete Blood Count: Repeat From: 20-Aug-2020 00:00 To: 22-Aug-2020 04:30, Every 1 day(s) (08-16-20 @ 22:36)  Complete Blood Count: Repeat From: 17-Aug-2020 00:00 To: 19-Aug-2020 04:30, Every 1 day(s) (08-16-20 @ 22:36)  Complete Blood Count: STAT (08-16-20 @ 22:36)    MEDICATIONS:  MEDICATIONS  (STANDING):  ALBUTerol   0.042% 1.25 milliGRAM(s) Nebulizer four times a day  aspirin  chewable 81 milliGRAM(s) Oral daily  atorvastatin 80 milliGRAM(s) Oral at bedtime  chlorhexidine 4% Liquid 1 Application(s) Topical <User Schedule>  metoprolol tartrate 25 milliGRAM(s) Oral two times a day  NIFEdipine IR 10 milliGRAM(s) Oral three times a day  pantoprazole    Tablet 40 milliGRAM(s) Oral two times a day  PARoxetine 10 milliGRAM(s) Oral daily  sodium chloride 0.9%. 1000 milliLiter(s) (100 mL/Hr) IV Continuous <Continuous>  ticagrelor 90 milliGRAM(s) Oral two times a day    MEDICATIONS  (PRN):    HOME MEDICATIONS:  Advair Diskus 100 mcg-50 mcg inhalation powder (08-16)  Aspir 81 oral delayed release tablet (08-16)  Breo Ellipta 100 mcg-25 mcg/inh inhalation powder (08-16)  Janumet 50 mg-500 mg oral tablet (08-16)  metoprolol succinate 25 mg oral tablet, extended release (08-16)  NIFEdipine 10 mg oral capsule (08-16)  Paxil 10 mg oral tablet (08-16)  Plavix 75 mg oral tablet (08-16)  pravastatin (08-16)  Proventil 2.5 mg/3 mL (0.083%) inhalation solution (08-16)  valsartan 40 mg oral tablet (08-16)    REVIEW OF SYSTEMS:  All other review of systems is negative unless indicated above.     PHYSICAL EXAM:  GENERAL: NAD  HEENT: No Swelling  CHEST/LUNG: Good air entry, No wheezing  HEART: RRR, No murmurs  ABDOMEN: Soft, Bowel sounds present  EXTREMITIES:  No clubbing

## 2020-08-18 ENCOUNTER — TRANSCRIPTION ENCOUNTER (OUTPATIENT)
Age: 60
End: 2020-08-18

## 2020-08-18 VITALS
DIASTOLIC BLOOD PRESSURE: 73 MMHG | OXYGEN SATURATION: 98 % | HEART RATE: 76 BPM | SYSTOLIC BLOOD PRESSURE: 108 MMHG | RESPIRATION RATE: 18 BRPM | TEMPERATURE: 98 F

## 2020-08-18 LAB
ALBUMIN SERPL ELPH-MCNC: 3.6 G/DL — SIGNIFICANT CHANGE UP (ref 3.5–5.2)
ALP SERPL-CCNC: 62 U/L — SIGNIFICANT CHANGE UP (ref 30–115)
ALT FLD-CCNC: 66 U/L — HIGH (ref 0–41)
ANION GAP SERPL CALC-SCNC: 13 MMOL/L — SIGNIFICANT CHANGE UP (ref 7–14)
AST SERPL-CCNC: 188 U/L — HIGH (ref 0–41)
BASOPHILS # BLD AUTO: 0.06 K/UL — SIGNIFICANT CHANGE UP (ref 0–0.2)
BASOPHILS NFR BLD AUTO: 0.4 % — SIGNIFICANT CHANGE UP (ref 0–1)
BILIRUB SERPL-MCNC: 1.9 MG/DL — HIGH (ref 0.2–1.2)
BUN SERPL-MCNC: 17 MG/DL — SIGNIFICANT CHANGE UP (ref 10–20)
CALCIUM SERPL-MCNC: 8.7 MG/DL — SIGNIFICANT CHANGE UP (ref 8.5–10.1)
CHLORIDE SERPL-SCNC: 103 MMOL/L — SIGNIFICANT CHANGE UP (ref 98–110)
CO2 SERPL-SCNC: 23 MMOL/L — SIGNIFICANT CHANGE UP (ref 17–32)
CREAT SERPL-MCNC: 1.1 MG/DL — SIGNIFICANT CHANGE UP (ref 0.7–1.5)
EOSINOPHIL # BLD AUTO: 0.06 K/UL — SIGNIFICANT CHANGE UP (ref 0–0.7)
EOSINOPHIL NFR BLD AUTO: 0.4 % — SIGNIFICANT CHANGE UP (ref 0–8)
GLUCOSE BLDC GLUCOMTR-MCNC: 110 MG/DL — HIGH (ref 70–99)
GLUCOSE BLDC GLUCOMTR-MCNC: 118 MG/DL — HIGH (ref 70–99)
GLUCOSE SERPL-MCNC: 118 MG/DL — HIGH (ref 70–99)
HCT VFR BLD CALC: 39.9 % — LOW (ref 42–52)
HGB BLD-MCNC: 12.4 G/DL — LOW (ref 14–18)
IMM GRANULOCYTES NFR BLD AUTO: 0.6 % — HIGH (ref 0.1–0.3)
LYMPHOCYTES # BLD AUTO: 15.6 % — LOW (ref 20.5–51.1)
LYMPHOCYTES # BLD AUTO: 2.23 K/UL — SIGNIFICANT CHANGE UP (ref 1.2–3.4)
MCHC RBC-ENTMCNC: 26.3 PG — LOW (ref 27–31)
MCHC RBC-ENTMCNC: 31.1 G/DL — LOW (ref 32–37)
MCV RBC AUTO: 84.7 FL — SIGNIFICANT CHANGE UP (ref 80–94)
MONOCYTES # BLD AUTO: 1.8 K/UL — HIGH (ref 0.1–0.6)
MONOCYTES NFR BLD AUTO: 12.6 % — HIGH (ref 1.7–9.3)
NEUTROPHILS # BLD AUTO: 10.03 K/UL — HIGH (ref 1.4–6.5)
NEUTROPHILS NFR BLD AUTO: 70.4 % — SIGNIFICANT CHANGE UP (ref 42.2–75.2)
NRBC # BLD: 0 /100 WBCS — SIGNIFICANT CHANGE UP (ref 0–0)
PLATELET # BLD AUTO: 274 K/UL — SIGNIFICANT CHANGE UP (ref 130–400)
POTASSIUM SERPL-MCNC: 3.9 MMOL/L — SIGNIFICANT CHANGE UP (ref 3.5–5)
POTASSIUM SERPL-SCNC: 3.9 MMOL/L — SIGNIFICANT CHANGE UP (ref 3.5–5)
PROT SERPL-MCNC: 6 G/DL — SIGNIFICANT CHANGE UP (ref 6–8)
RBC # BLD: 4.71 M/UL — SIGNIFICANT CHANGE UP (ref 4.7–6.1)
RBC # FLD: 13.6 % — SIGNIFICANT CHANGE UP (ref 11.5–14.5)
SARS-COV-2 IGG SERPL QL IA: POSITIVE
SARS-COV-2 IGM SERPL IA-ACNC: 30.9 INDEX — HIGH
SODIUM SERPL-SCNC: 139 MMOL/L — SIGNIFICANT CHANGE UP (ref 135–146)
WBC # BLD: 14.26 K/UL — HIGH (ref 4.8–10.8)
WBC # FLD AUTO: 14.26 K/UL — HIGH (ref 4.8–10.8)

## 2020-08-18 PROCEDURE — 99239 HOSP IP/OBS DSCHRG MGMT >30: CPT

## 2020-08-18 PROCEDURE — 93010 ELECTROCARDIOGRAM REPORT: CPT

## 2020-08-18 PROCEDURE — 71045 X-RAY EXAM CHEST 1 VIEW: CPT | Mod: 26

## 2020-08-18 RX ORDER — METOPROLOL TARTRATE 50 MG
1 TABLET ORAL
Qty: 0 | Refills: 0 | DISCHARGE
Start: 2020-08-18

## 2020-08-18 RX ORDER — TICAGRELOR 90 MG/1
1 TABLET ORAL
Qty: 60 | Refills: 0
Start: 2020-08-18 | End: 2020-09-16

## 2020-08-18 RX ORDER — LOSARTAN POTASSIUM 100 MG/1
1 TABLET, FILM COATED ORAL
Qty: 30 | Refills: 0
Start: 2020-08-18 | End: 2020-09-16

## 2020-08-18 RX ORDER — ATORVASTATIN CALCIUM 80 MG/1
1 TABLET, FILM COATED ORAL
Qty: 30 | Refills: 0
Start: 2020-08-18 | End: 2020-09-16

## 2020-08-18 RX ORDER — METOPROLOL TARTRATE 50 MG
1 TABLET ORAL
Qty: 60 | Refills: 0
Start: 2020-08-18 | End: 2020-09-16

## 2020-08-18 RX ORDER — NIFEDIPINE 30 MG
10 TABLET, EXTENDED RELEASE 24 HR ORAL THREE TIMES A DAY
Refills: 0 | Status: DISCONTINUED | OUTPATIENT
Start: 2020-08-18 | End: 2020-08-18

## 2020-08-18 RX ORDER — LOSARTAN POTASSIUM 100 MG/1
1 TABLET, FILM COATED ORAL
Qty: 0 | Refills: 0 | DISCHARGE
Start: 2020-08-18

## 2020-08-18 RX ORDER — ASPIRIN/CALCIUM CARB/MAGNESIUM 324 MG
1 TABLET ORAL
Qty: 30 | Refills: 0
Start: 2020-08-18 | End: 2020-09-16

## 2020-08-18 RX ORDER — ATORVASTATIN CALCIUM 80 MG/1
1 TABLET, FILM COATED ORAL
Qty: 0 | Refills: 0 | DISCHARGE
Start: 2020-08-18

## 2020-08-18 RX ADMIN — PANTOPRAZOLE SODIUM 40 MILLIGRAM(S): 20 TABLET, DELAYED RELEASE ORAL at 05:47

## 2020-08-18 RX ADMIN — LOSARTAN POTASSIUM 50 MILLIGRAM(S): 100 TABLET, FILM COATED ORAL at 05:47

## 2020-08-18 RX ADMIN — Medication 81 MILLIGRAM(S): at 11:47

## 2020-08-18 RX ADMIN — CHLORHEXIDINE GLUCONATE 1 APPLICATION(S): 213 SOLUTION TOPICAL at 05:47

## 2020-08-18 RX ADMIN — TICAGRELOR 90 MILLIGRAM(S): 90 TABLET ORAL at 17:16

## 2020-08-18 RX ADMIN — Medication 25 MILLIGRAM(S): at 17:16

## 2020-08-18 RX ADMIN — TICAGRELOR 90 MILLIGRAM(S): 90 TABLET ORAL at 05:48

## 2020-08-18 RX ADMIN — Medication 10 MILLIGRAM(S): at 11:47

## 2020-08-18 RX ADMIN — Medication 25 MILLIGRAM(S): at 06:16

## 2020-08-18 NOTE — DISCHARGE NOTE PROVIDER - PROVIDER TOKENS
PROVIDER:[TOKEN:[[id number]],FOLLOWUP:[1-3 days]],PROVIDER:[TOKEN:[[id number]],ESTABLISHEDPATIENT:[T]]

## 2020-08-18 NOTE — PROGRESS NOTE ADULT - ATTENDING COMMENTS
Patient seen and examined independently. I agree with the resident's note, physical exam, and plan except as below.  Vital Signs Last 24 Hrs  T(C): 37 (18 Aug 2020 12:00), Max: 38.4 (17 Aug 2020 16:00)  T(F): 98.6 (18 Aug 2020 12:00), Max: 101.1 (17 Aug 2020 16:00)  HR: 73 (18 Aug 2020 12:00) (72 - 98)  BP: 99/61 (18 Aug 2020 12:00) (93/53 - 124/69)  BP(mean): 77 (18 Aug 2020 12:00) (67 - 95)  RR: 18 (18 Aug 2020 12:00) (18 - 39)  SpO2: 98% (18 Aug 2020 12:00) (93% - 99%)  Pe  nad  aaox3  [id number]  ctabl  softntn+bs  right groin cath site intact  no cce    #STEMI  There is significant triple vessel coronary artery disease and left main disease.  of distal RCA. Patent LIMA graft to LAD. Successful PCI of proximal LCX into OM2 (culprit vessel) and severe LM disease.    < from: Transthoracic Echocardiogram (08.17.20 @ 09:51) >    Summary:   1. Left ventricular ejection fraction, by visual estimation, is 55 to 60%.   2. Normal left ventricular size and wall thicknesses, with normal systolic and diastolic function.   3. Mild mitral valve regurgitation.   4. LA volume Index is 29.4 ml/m² ml/m2.    < end of copied text >    no chest pain, sob  asa, brillinta, lipitor, metoprolol     #fever, leukocytosis - no sepsis - no signs of infection - likely reactive to MI - resolved now  #covid negative - but antibodies positive - likely previous infection - per pt about 3 months ago had fever  #DMII  CAPILLARY BLOOD GLUCOSE      POCT Blood Glucose.: 118 mg/dL (18 Aug 2020 11:57)  POCT Blood Glucose.: 110 mg/dL (18 Aug 2020 07:40)  POCT Blood Glucose.: 114 mg/dL (17 Aug 2020 21:17)  POCT Blood Glucose.: 122 mg/dL (17 Aug 2020 16:42)    stable to dc home today  spoke with cardio Dr Shore over phone - can follow up in office  med rec reviewed  pt counselled  PMD Dr Warner   time spent 35 mins

## 2020-08-18 NOTE — DISCHARGE NOTE PROVIDER - CARE PROVIDER_API CALL
Vega ROSE)  Cardiology; Internal Medicine  [address] [place] [address]  Phone: [phone]  Fax: [phone]  Follow Up Time: 1-3 days    Conner Warner  INTERNAL MEDICINE  [address] [place] [address]  Phone: [phone]  Fax: [phone]  Established Patient  Follow Up Time:

## 2020-08-18 NOTE — DISCHARGE NOTE PROVIDER - NSDCMRMEDTOKEN_GEN_ALL_CORE_FT
atorvastatin 80 mg oral tablet: 1 tab(s) orally once a day (at bedtime)  Janumet 50 mg-500 mg oral tablet: 1 tab(s) orally 2 times a day  metoprolol tartrate 25 mg oral tablet: 1 tab(s) orally 2 times a day  Paxil 10 mg oral tablet: 1 tab(s) orally once a day  ticagrelor 90 mg oral tablet: 1 tab(s) orally 2 times a day MDD:2 aspirin 81 mg oral delayed release tablet: 1 tab(s) orally once a day   atorvastatin 80 mg oral tablet: 1 tab(s) orally once a day (at bedtime)  Janumet 50 mg-500 mg oral tablet: 1 tab(s) orally 2 times a day  losartan 50 mg oral tablet: 1 tab(s) orally once a day  metoprolol tartrate 25 mg oral tablet: 1 tab(s) orally 2 times a day  Paxil 10 mg oral tablet: 1 tab(s) orally once a day  ticagrelor 90 mg oral tablet: 1 tab(s) orally 2 times a day MDD:2

## 2020-08-18 NOTE — PROGRESS NOTE ADULT - SUBJECTIVE AND OBJECTIVE BOX
DAILY PROGRESS NOTE  ===========================================================    Patient Information:  KATH MCCOLLUM  /  60y  /  Male  /  MRN#: [id number]    Hospital Day: 2d     |:::::::::::::::::::::::::::| SUBJECTIVE |:::::::::::::::::::::::::::|    OVERNIGHT EVENTS:   TODAY: Patient was seen today at bedside. Doing well w/o any complaints. Review of systems is otherwise negative.    |:::::::::::::::::::::::::::| OBJECTIVE |:::::::::::::::::::::::::::|    VITAL SIGNS: Last 24 Hours  T(C): 36.8 (18 Aug 2020 08:00), Max: 38.4 (17 Aug 2020 16:00)  T(F): 98.2 (18 Aug 2020 08:00), Max: 101.1 (17 Aug 2020 16:00)  HR: 72 (18 Aug 2020 10:00) (72 - 98)  BP: 93/60 (18 Aug 2020 10:00) (93/53 - 124/69)  BP(mean): 67 (18 Aug 2020 10:00) (62 - 95)  RR: 21 (18 Aug 2020 10:00) (20 - 39)  SpO2: 98% (18 Aug 2020 08:00) (93% - 99%)    08-17-20 @ 07:01  -  08-18-20 @ 07:00  --------------------------------------------------------  IN: 0 mL / OUT: 1175 mL / NET: -1175 mL    08-18-20 @ 07:01  -  08-18-20 @ 11:05  --------------------------------------------------------  IN: 420 mL / OUT: 200 mL / NET: 220 mL      PHYSICAL EXAM:  GENERAL:   Awake, alert; NAD.  HEENT:  Head NC/AT; Conjunctivae pink, Sclera anicteric; Oral mucosa moist.  CARDIO:   Regular rate; Regular rhythm; S1 & S2.  RESP:   No rales, wheezing, or rhonchi appreciated.  GI:   Soft; NT/ND; BS; No guarding; No rebound tenderness.  EXT:   No edema.   SKIN:   Intact.    LAB RESULTS:                        12.4   14.26 )-----------( 274      ( 18 Aug 2020 05:10 )             39.9     08-18    139  |  103  |  17  ----------------------------<  118<H>  3.9   |  23  |  1.1    Ca    8.7      18 Aug 2020 05:10  Mg     2.2     08-16    TPro  6.0  /  Alb  3.6  /  TBili  1.9<H>  /  DBili  x   /  AST  188<H>  /  ALT  66<H>  /  AlkPhos  62  08-18    PT/INR - ( 16 Aug 2020 16:40 )   PT: 12.70 sec;   INR: 1.10 ratio         PTT - ( 16 Aug 2020 16:40 )  PTT:34.1 sec      Troponin T, Serum: 6.77 ng/mL <HH> (08-17-20 @ 21:42)    CARDIAC MARKERS ( 17 Aug 2020 21:42 )  x     / 6.77 ng/mL / x     / x     / x      CARDIAC MARKERS ( 17 Aug 2020 04:17 )  x     / 10.28 ng/mL / x     / x     / x      CARDIAC MARKERS ( 16 Aug 2020 16:40 )  x     / 0.42 ng/mL / x     / x     / x          MICROBIOLOGY:  None    RADIOLOGY:  Reviewed  ALLERGIES:  No Known Allergies      ===========================================================

## 2020-08-18 NOTE — DISCHARGE NOTE PROVIDER - HOSPITAL COURSE
60-year-old male, KTH: Hypertension, Type II DM, Dyslipidemia, CAD S/P LIMA CABG done 12 years ago presented with Chest pain at [facility]. In the ER an EKG was done and it showed STEMI in leads V5-V6. The patient was given a loading dose of Aspirin and Brilinta in south side, was started on a heparin drip and transferred directly to the our facility for the Cath Lab.  performed the Cardiac cath which showed lesion in the Left Circumflux artery (almost complete stenosis) and lesion in the left main (80%). IVONE x 1 to prox LCX and cutting balloon angioplasty and IOVNE x 1 to LM.  Pt doing well after intervention and able to follow up with cardio Dr. Shore and his PCP Dr. Timmy perez.

## 2020-08-18 NOTE — DISCHARGE NOTE PROVIDER - NSDCCPGOAL_GEN_ALL_CORE_FT
You were noted to have a myocardial infarction, more commonly known as a heart attack during this hospitalization. It is VERY important to do whatever is necessary in order to avoid having another one of these potentially life ending events! Be sure to take ALL medications exactly as prescribed, do not miss these medications and do not change the dose or how often you take them as their effectiveness is based on being consistent with them. If you smoke, STOP NOW! Smoking can greatly increase your risk of having another heart attack. Please speak to your primary care doctor about different options available to help you do so. If your body can handle it and after you have been cleared by a doctor, getting 30-60min a day of exercise at least 5x a week will help keep your heart healthy and happy. Be sure to follow a heart healthy diet, one full of fresh vegetables, fruits, and if you eat meat, lean meats. Avoid fatty greasy foods or heavily processed foods. Be sure to follow up with your Cardiologist as suggested.

## 2020-08-18 NOTE — PROGRESS NOTE ADULT - SUBJECTIVE AND OBJECTIVE BOX
SUBJ: Patient seen and examined. No events overnight.       MEDICATIONS  (STANDING):  ALBUTerol   0.042% 1.25 milliGRAM(s) Nebulizer four times a day  aspirin  chewable 81 milliGRAM(s) Oral daily  atorvastatin 80 milliGRAM(s) Oral at bedtime  chlorhexidine 4% Liquid 1 Application(s) Topical <User Schedule>  losartan 50 milliGRAM(s) Oral daily  metoprolol tartrate 25 milliGRAM(s) Oral two times a day  pantoprazole    Tablet 40 milliGRAM(s) Oral two times a day  PARoxetine 10 milliGRAM(s) Oral daily  sodium chloride 0.9%. 1000 milliLiter(s) (100 mL/Hr) IV Continuous <Continuous>  ticagrelor 90 milliGRAM(s) Oral two times a day    MEDICATIONS  (PRN):            Vital Signs Last 24 Hrs  T(C): 37 (18 Aug 2020 12:00), Max: 38.4 (17 Aug 2020 16:00)  T(F): 98.6 (18 Aug 2020 12:00), Max: 101.1 (17 Aug 2020 16:00)  HR: 73 (18 Aug 2020 12:00) (72 - 98)  BP: 99/61 (18 Aug 2020 12:00) (93/53 - 124/69)  BP(mean): 77 (18 Aug 2020 12:00) (67 - 95)  RR: 18 (18 Aug 2020 12:00) (18 - 39)  SpO2: 98% (18 Aug 2020 12:00) (93% - 99%)     REVIEW OF SYSTEMS:  CONSTITUTIONAL: No fever  CARDIOLOGY: PAtient denies chest pain, shortness of breath or palpitations .  Repiratory: No cough or wheezing.  NEUROLOGICAL: No focal deficits to report.  GI: no BRBPR, no N,V,diarrhea.            PHYSICAL EXAM:  · CONSTITUTIONAL:	NAD  ·RESPIRATORY:    good air movement; respirations non-labored; clear to auscultation bilaterally;  no rales,rhonchi or wheeze  · CARDIOVASCULAR	regular rate and rhythm   no murmur    · EXTREMITIES: No cyanosis, clubbing or edema    	  TELEMETRY: no events      LABS:                        12.4   14.26 )-----------( 274      ( 18 Aug 2020 05:10 )             39.9     08-18    139  |  103  |  17  ----------------------------<  118<H>  3.9   |  23  |  1.1    Ca    8.7      18 Aug 2020 05:10  Mg     2.2     08-16    TPro  6.0  /  Alb  3.6  /  TBili  1.9<H>  /  DBili  x   /  AST  188<H>  /  ALT  66<H>  /  AlkPhos  62  08-18    CARDIAC MARKERS ( 17 Aug 2020 21:42 )  x     / 6.77 ng/mL / x     / x     / x      CARDIAC MARKERS ( 17 Aug 2020 04:17 )  x     / 10.28 ng/mL / x     / x     / x      CARDIAC MARKERS ( 16 Aug 2020 16:40 )  x     / 0.42 ng/mL / x     / x     / x          PT/INR - ( 16 Aug 2020 16:40 )   PT: 12.70 sec;   INR: 1.10 ratio         PTT - ( 16 Aug 2020 16:40 )  PTT:34.1 sec    I&O's Summary    17 Aug 2020 07:01  -  18 Aug 2020 07:00  --------------------------------------------------------  IN: 0 mL / OUT: 1175 mL / NET: -1175 mL    18 Aug 2020 07:01  -  18 Aug 2020 12:07  --------------------------------------------------------  IN: 420 mL / OUT: 200 mL / NET: 220 mL      BNP  RADIOLOGY & ADDITIONAL STUDIES:    IMPRESSION AND PLAN:    STEMI S/p PCI to LCx and LM  - Continue DAPT  - May transfer to telemetry

## 2020-08-18 NOTE — PROGRESS NOTE ADULT - SUBJECTIVE AND OBJECTIVE BOX
Cardiology Follow up    KATH MCCOLLUM   60y Male  PAST MEDICAL & SURGICAL HISTORY:  Ulcer of esophagus  Chronic gastric ulcer without hemorrhage and without perforation  High cholesterol  GERD (gastroesophageal reflux disease)  Cardiac abnormality  Chronic obstructive pulmonary disease  Cardiac arrhythmia: bypass surgery 9 years ago  Coronary artery disease involving other coronary artery bypass graft, angina presence unspecified     HPI:  60-year-old male, KTH: Hypertension, Type II DM, Dyslipidemia, CAD S/P LIMA CABG done 12 years ago. Presented with Chest pain to the [facility]. History goes back to yesterday when the patient had an episode of self resolving chest pain ( 10 minutes duration) which he contributed to maldigestion. Today at 3:00 p.m He had another episode of dull and sore chest pain, radiating to th right arm pit and shoulder. The pain was severe, and unrelieved by any medication. The patient reports increased in the dull pain with inspiration, but the pain does not seem to be pleuritic ( NOT sharp in nature).   The patient reports Nausea and vomiting with diaphoresis. He also reports one episode of low grade fever yesterday 101.     In the ER an EKG was done and it showed STEMI in leads V5-V6. The patient was given a loading dose of Aspirin and Brilinta in [facility], was started on a heparin drip and transferred directly to the our facility for the Cath Lab.      performed the Cardiac cath which showed:   a) Lesion in the Left Circumflux artery (almost complete stenosis)  b) Lesion in the left main (80%) (16 Aug 2020 19:52)    Allergies    No Known Allergies    Intolerances    Patient without complaints. Pt ambulated without issues/symptoms OOB to chair  Denies CP, SOB, palpitations, or dizziness  No events on telemetry overnight    Vital Signs Last 24 Hrs  T(C): 37 (18 Aug 2020 12:00), Max: 38.4 (17 Aug 2020 16:00)  T(F): 98.6 (18 Aug 2020 12:00), Max: 101.1 (17 Aug 2020 16:00)  HR: 73 (18 Aug 2020 12:00) (72 - 98)  BP: 99/61 (18 Aug 2020 12:00) (93/53 - 124/69)  BP(mean): 77 (18 Aug 2020 12:00) (67 - 95)  RR: 18 (18 Aug 2020 12:00) (18 - 39)  SpO2: 98% (18 Aug 2020 12:00) (93% - 99%)    MEDICATIONS  (STANDING):  ALBUTerol   0.042% 1.25 milliGRAM(s) Nebulizer four times a day  aspirin  chewable 81 milliGRAM(s) Oral daily  atorvastatin 80 milliGRAM(s) Oral at bedtime  chlorhexidine 4% Liquid 1 Application(s) Topical <User Schedule>  losartan 50 milliGRAM(s) Oral daily  metoprolol tartrate 25 milliGRAM(s) Oral two times a day  pantoprazole    Tablet 40 milliGRAM(s) Oral two times a day  PARoxetine 10 milliGRAM(s) Oral daily  sodium chloride 0.9%. 1000 milliLiter(s) (100 mL/Hr) IV Continuous <Continuous>  ticagrelor 90 milliGRAM(s) Oral two times a day    MEDICATIONS  (PRN):    REVIEW OF SYSTEMS:          CONSTITUTIONAL: No weakness, fevers or chills          EYES/ENT: No visual changes;  No vertigo or throat pain           NECK: No pain or stiffness          RESPIRATORY: No cough, wheezing, hemoptysis          CARDIOVASCULAR: no pain, no MURRIETA, no palpitations           GASTROINTESTINAL: No abdominal or epigastric pain. No nausea, vomiting, or hematemesis;           GENITOURINARY: No dysuria, frequency or hematuria          NEUROLOGICAL: No numbness or weakness          SKIN: No itching, rashes    PHYSICAL EXAM:           CONSTITUTIONAL: Well-developed; well-nourished; in no acute distress  	SKIN: warm, dry  	HEAD: Normocephalic; atraumatic  	EYES: PERRL.  	ENT: No nasal discharge, airway clear, mucous membranes moist  	NECK: Supple; non tender.  	CARD: +S1, +S2, no murmurs, gallops, or rubs. Regular rate and rhythm    	RESP: No wheezes, rales or rhonchi. CTA B/L  	ABD: soft ntnd, + BS x 4 quadrants  	EXT: moves all extremities,  no clubbing, cyanosis or edema  	NEURO: Alert and oriented x3, no focal deficits          PSYCH: Cooperative, appropriate          VASCULAR:  + Rad / + PTs / +  DPs          EXTREMITY:             Right Groin: access site soft, no hematoma, no pain, + pulses, no sign of infection, no numbness      ECG:  < from: 12 Lead ECG (08.18.20 @ 06:33) >  Ventricular Rate 75 BPM    Atrial Rate 75 BPM    P-R Interval 132 ms    QRS Duration 118 ms    Q-T Interval 432 ms    QTC Calculation(Bezet) 482 ms    P Axis 38 degrees    R Axis -74 degrees    T Axis 246 degrees    Diagnosis Line Normal sinus rhythm  Left anterior fascicular block  Possible Lateral infarct , age undetermined  Inferior-posterior infarct , age undetermined  Abnormal ECG    Confirmed by JO RACHEL MD ([id number]) on 8/18/2020 6:48:43 AM                                                                                                      2D ECHO:  < from: Transthoracic Echocardiogram (08.17.20 @ 09:51) >  Summary:   1. Left ventricular ejection fraction, by visual estimation, is 55 to 60%.   2. Normal left ventricular size and wall thicknesses, with normal systolic and diastolic function.   3. Mild mitral valve regurgitation.   4. LA volume Index is 29.4 ml/m² ml/m2.    LABS:                        12.4   14.26 )-----------( 274      ( 18 Aug 2020 05:10 )             39.9     08-18    139  |  103  |  17  ----------------------------<  118<H>  3.9   |  23  |  1.1    Ca    8.7      18 Aug 2020 05:10  Mg     2.2     08-16    A1C with Estimated Average Glucose Result: 6.2: Method: Immunoassay    Lipid Profile in AM (08.17.20 @ 04:17)    Total Cholesterol/HDL Ratio Measurement: 6.6 Ratio    Cholesterol, Serum: 226 mg/dL    Triglycerides, Serum: 130 mg/dL    HDL Cholesterol, Serum: 34: HDL Levels >/= 60 mg/dL are considered beneficial and a "negative" risk  factor.  Effective 08/15/2018: New reference range and interpretive comment. mg/dL    Direct LDL: 155: LDL Cholesterol (mg/dL) --- Interpretive Comment (for adults 18 and over)    TPro  6.0  /  Alb  3.6  /  TBili  1.9<H>  /  DBili  x   /  AST  188<H>  /  ALT  66<H>  /  AlkPhos  62  08-18    CARDIAC MARKERS ( 17 Aug 2020 21:42 )  x     / 6.77 ng/mL / x     / x     / x      CARDIAC MARKERS ( 17 Aug 2020 04:17 )  x     / 10.28 ng/mL / x     / x     / x      CARDIAC MARKERS ( 16 Aug 2020 16:40 )  x     / 0.42 ng/mL / x     / x     / x        LIVER FUNCTIONS - ( 18 Aug 2020 05:10 )  Alb: 3.6 g/dL / Pro: 6.0 g/dL / ALK PHOS: 62 U/L / ALT: 66 U/L / AST: 188 U/L / GGT: x             A/P:  I discussed the case with Cardiologist Dr. Mone Ferrari and recommend the following:    S/P PCI:   INTERVENTION/ IMPLANTS: IVONE x 1 to prox LCX. cutting balloon angioplasty and IVONE x 1 to LM.      POST-OP DIAGNOSIS  There is significant triple vessel coronary artery disease and left main disease.  of distal RCA. Patent LIMA graft to LAD. Successful PCI of proximal LCX into OM2 (culprit vessel) and severe LM disease.                     Hold Metformin x 48 hr post Cath                   Keep K = 4, Mg = 2                   Monitor LFT's                   DVT / GI prophylaxis   	     Continue DAPT ( Aspirin 81 mg PO Daily and Brilinta 90 mg PO q12 hr ), B-Blocker, Statin Therapy, ARB, PPI                   Patient pharmacy called in for Brilinta prescription and it is 363$ per month ( no insurance ) patient is agreeing for it                    Patient agreeing to take DAPT for at least one year or as directed by cardiologist                    Pt given instructions on importance of taking antiplatelet medication or risk acute stent thrombosis/death                   Post cath instructions, access site care and activity restrictions reviewed with patient                     Discussed with patient to return to hospital if experience chest pain, shortness breath, dizziness and site bleeding                   Aggressive risk factor modification, diet counseling, smoking cessation discussed with patient                       Transfer to Telemetry Cardiology Follow up    KATH MCCOLLUM   60y Male  PAST MEDICAL & SURGICAL HISTORY:  Ulcer of esophagus  Chronic gastric ulcer without hemorrhage and without perforation  High cholesterol  GERD (gastroesophageal reflux disease)  Cardiac abnormality  Chronic obstructive pulmonary disease  Cardiac arrhythmia: bypass surgery 9 years ago  Coronary artery disease involving other coronary artery bypass graft, angina presence unspecified     HPI:  60-year-old male, KTH: Hypertension, Type II DM, Dyslipidemia, CAD S/P LIMA CABG done 12 years ago. Presented with Chest pain to the [facility]. History goes back to yesterday when the patient had an episode of self resolving chest pain ( 10 minutes duration) which he contributed to maldigestion. Today at 3:00 p.m He had another episode of dull and sore chest pain, radiating to th right arm pit and shoulder. The pain was severe, and unrelieved by any medication. The patient reports increased in the dull pain with inspiration, but the pain does not seem to be pleuritic ( NOT sharp in nature).   The patient reports Nausea and vomiting with diaphoresis. He also reports one episode of low grade fever yesterday 101.     In the ER an EKG was done and it showed STEMI in leads V5-V6. The patient was given a loading dose of Aspirin and Brilinta in [facility], was started on a heparin drip and transferred directly to the our facility for the Cath Lab.     Cardiac cath showed:   a) Lesion in the Left Circumflux artery (almost complete stenosis)  b) Lesion in the left main (80%) (16 Aug 2020 19:52)    Allergies    No Known Allergies    Intolerances    Patient without complaints. Pt ambulated without issues/symptoms OOB to chair  Denies CP, SOB, palpitations, or dizziness  No events on telemetry overnight    Vital Signs Last 24 Hrs  T(C): 37 (18 Aug 2020 12:00), Max: 38.4 (17 Aug 2020 16:00)  T(F): 98.6 (18 Aug 2020 12:00), Max: 101.1 (17 Aug 2020 16:00)  HR: 73 (18 Aug 2020 12:00) (72 - 98)  BP: 99/61 (18 Aug 2020 12:00) (93/53 - 124/69)  BP(mean): 77 (18 Aug 2020 12:00) (67 - 95)  RR: 18 (18 Aug 2020 12:00) (18 - 39)  SpO2: 98% (18 Aug 2020 12:00) (93% - 99%)    MEDICATIONS  (STANDING):  ALBUTerol   0.042% 1.25 milliGRAM(s) Nebulizer four times a day  aspirin  chewable 81 milliGRAM(s) Oral daily  atorvastatin 80 milliGRAM(s) Oral at bedtime  chlorhexidine 4% Liquid 1 Application(s) Topical <User Schedule>  losartan 50 milliGRAM(s) Oral daily  metoprolol tartrate 25 milliGRAM(s) Oral two times a day  pantoprazole    Tablet 40 milliGRAM(s) Oral two times a day  PARoxetine 10 milliGRAM(s) Oral daily  sodium chloride 0.9%. 1000 milliLiter(s) (100 mL/Hr) IV Continuous <Continuous>  ticagrelor 90 milliGRAM(s) Oral two times a day    MEDICATIONS  (PRN):    REVIEW OF SYSTEMS:          CONSTITUTIONAL: No weakness, fevers or chills          EYES/ENT: No visual changes;  No vertigo or throat pain           NECK: No pain or stiffness          RESPIRATORY: No cough, wheezing, hemoptysis          CARDIOVASCULAR: no pain, no MURRIETA, no palpitations           GASTROINTESTINAL: No abdominal or epigastric pain. No nausea, vomiting, or hematemesis;           GENITOURINARY: No dysuria, frequency or hematuria          NEUROLOGICAL: No numbness or weakness          SKIN: No itching, rashes    PHYSICAL EXAM:           CONSTITUTIONAL: Well-developed; well-nourished; in no acute distress  	SKIN: warm, dry  	HEAD: Normocephalic; atraumatic  	EYES: PERRL.  	ENT: No nasal discharge, airway clear, mucous membranes moist  	NECK: Supple; non tender.  	CARD: +S1, +S2, no murmurs, gallops, or rubs. Regular rate and rhythm    	RESP: No wheezes, rales or rhonchi. CTA B/L  	ABD: soft ntnd, + BS x 4 quadrants  	EXT: moves all extremities,  no clubbing, cyanosis or edema  	NEURO: Alert and oriented x3, no focal deficits          PSYCH: Cooperative, appropriate          VASCULAR:  + Rad / + PTs / +  DPs          EXTREMITY:             Right Groin: access site soft, no hematoma, no pain, + pulses, no sign of infection, no numbness      ECG:  < from: 12 Lead ECG (08.18.20 @ 06:33) >  Ventricular Rate 75 BPM    Atrial Rate 75 BPM    P-R Interval 132 ms    QRS Duration 118 ms    Q-T Interval 432 ms    QTC Calculation(Bezet) 482 ms    P Axis 38 degrees    R Axis -74 degrees    T Axis 246 degrees    Diagnosis Line Normal sinus rhythm  Left anterior fascicular block  Possible Lateral infarct , age undetermined  Inferior-posterior infarct , age undetermined  Abnormal ECG    Confirmed by JO RACHEL MD ([id number]) on 8/18/2020 6:48:43 AM                                                                                                      2D ECHO:  < from: Transthoracic Echocardiogram (08.17.20 @ 09:51) >  Summary:   1. Left ventricular ejection fraction, by visual estimation, is 55 to 60%.   2. Normal left ventricular size and wall thicknesses, with normal systolic and diastolic function.   3. Mild mitral valve regurgitation.   4. LA volume Index is 29.4 ml/m² ml/m2.    LABS:                        12.4   14.26 )-----------( 274      ( 18 Aug 2020 05:10 )             39.9     08-18    139  |  103  |  17  ----------------------------<  118<H>  3.9   |  23  |  1.1    Ca    8.7      18 Aug 2020 05:10  Mg     2.2     08-16    A1C with Estimated Average Glucose Result: 6.2: Method: Immunoassay    Lipid Profile in AM (08.17.20 @ 04:17)    Total Cholesterol/HDL Ratio Measurement: 6.6 Ratio    Cholesterol, Serum: 226 mg/dL    Triglycerides, Serum: 130 mg/dL    HDL Cholesterol, Serum: 34: HDL Levels >/= 60 mg/dL are considered beneficial and a "negative" risk  factor.  Effective 08/15/2018: New reference range and interpretive comment. mg/dL    Direct LDL: 155: LDL Cholesterol (mg/dL) --- Interpretive Comment (for adults 18 and over)    TPro  6.0  /  Alb  3.6  /  TBili  1.9<H>  /  DBili  x   /  AST  188<H>  /  ALT  66<H>  /  AlkPhos  62  08-18    CARDIAC MARKERS ( 17 Aug 2020 21:42 )  x     / 6.77 ng/mL / x     / x     / x      CARDIAC MARKERS ( 17 Aug 2020 04:17 )  x     / 10.28 ng/mL / x     / x     / x      CARDIAC MARKERS ( 16 Aug 2020 16:40 )  x     / 0.42 ng/mL / x     / x     / x        LIVER FUNCTIONS - ( 18 Aug 2020 05:10 )  Alb: 3.6 g/dL / Pro: 6.0 g/dL / ALK PHOS: 62 U/L / ALT: 66 U/L / AST: 188 U/L / GGT: x             A/P:  I discussed the case with Cardiologist Dr. Mone Ferrari and recommend the following:    S/P PCI:   INTERVENTION/ IMPLANTS: IVONE x 1 to prox LCX. cutting balloon angioplasty and IVONE x 1 to LM.      POST-OP DIAGNOSIS  There is significant triple vessel coronary artery disease and left main disease.  of distal RCA. Patent LIMA graft to LAD. Successful PCI of proximal LCX into OM2 (culprit vessel) and severe LM disease.                     Hold Metformin x 48 hr post Cath                   Keep K = 4, Mg = 2                   Monitor LFT's                   DVT / GI prophylaxis   	     Continue DAPT ( Aspirin 81 mg PO Daily and Brilinta 90 mg PO q12 hr ), B-Blocker, Statin Therapy, ARB, PPI                   Patient pharmacy called in for Brilinta prescription and it is 363$ per month ( no insurance ) patient is agreeing for it                    Patient agreeing to take DAPT for at least one year or as directed by cardiologist                    Pt given instructions on importance of taking antiplatelet medication or risk acute stent thrombosis/death                   Post cath instructions, access site care and activity restrictions reviewed with patient                     Discussed with patient to return to hospital if experience chest pain, shortness breath, dizziness and site bleeding                   Aggressive risk factor modification, diet counseling, smoking cessation discussed with patient                      May D/C  Home today

## 2020-08-18 NOTE — PROGRESS NOTE ADULT - ASSESSMENT
60 y.o male patient with PMH of CAD s/p CABG in 2008 (LIMA to LAD), DM, HTN, HLD, reflux esophagitis presented to the ED for chest pain. EKG showed ST elevation MI.     #STEMI:   -Aspirin 81mg, Brilinta 90mg q12h, metoprolol tartrate 25mg q12h, atorvastatin 80 qHs, Losartan 25 BID.  -Echo WNL.      #Hypertension:   - Continue with Nifedipine     #Type II DM  - D/c Janumet for 48 hours post-cath   - Insulin if needed     *PPI for GI prophylaxis  *DVT prophylaxis SCD

## 2020-08-18 NOTE — DISCHARGE NOTE NURSING/CASE MANAGEMENT/SOCIAL WORK - PATIENT PORTAL LINK FT
You can access the FollowMyHealth Patient Portal offered by [facility] by registering at the following website: [email or web address]. By joining [facility]’s FollowMyHealth portal, you will also be able to view your health information using other applications (apps) compatible with our system.

## 2020-08-23 LAB
CULTURE RESULTS: SIGNIFICANT CHANGE UP
SPECIMEN SOURCE: SIGNIFICANT CHANGE UP

## 2020-09-11 ENCOUNTER — INPATIENT (INPATIENT)
Facility: HOSPITAL | Age: 60
LOS: 2 days | Discharge: HOME | End: 2020-09-14
Attending: STUDENT IN AN ORGANIZED HEALTH CARE EDUCATION/TRAINING PROGRAM | Admitting: STUDENT IN AN ORGANIZED HEALTH CARE EDUCATION/TRAINING PROGRAM
Payer: MEDICAID

## 2020-09-11 VITALS
RESPIRATION RATE: 15 BRPM | TEMPERATURE: 96 F | HEART RATE: 78 BPM | OXYGEN SATURATION: 100 % | HEIGHT: 67 IN | WEIGHT: 199.96 LBS

## 2020-09-11 DIAGNOSIS — Z95.5 PRESENCE OF CORONARY ANGIOPLASTY IMPLANT AND GRAFT: Chronic | ICD-10-CM

## 2020-09-11 DIAGNOSIS — I25.810 ATHEROSCLEROSIS OF CORONARY ARTERY BYPASS GRAFT(S) WITHOUT ANGINA PECTORIS: Chronic | ICD-10-CM

## 2020-09-11 DIAGNOSIS — R74.0 NONSPECIFIC ELEVATION OF LEVELS OF TRANSAMINASE AND LACTIC ACID DEHYDROGENASE [LDH]: ICD-10-CM

## 2020-09-11 DIAGNOSIS — I49.9 CARDIAC ARRHYTHMIA, UNSPECIFIED: Chronic | ICD-10-CM

## 2020-09-11 LAB
ALBUMIN SERPL ELPH-MCNC: 4 G/DL — SIGNIFICANT CHANGE UP (ref 3.5–5.2)
ALP SERPL-CCNC: 140 U/L — HIGH (ref 30–115)
ALT FLD-CCNC: 202 U/L — HIGH (ref 0–41)
ANION GAP SERPL CALC-SCNC: 12 MMOL/L — SIGNIFICANT CHANGE UP (ref 7–14)
APTT BLD: 30.7 SEC — SIGNIFICANT CHANGE UP (ref 27–39.2)
AST SERPL-CCNC: 233 U/L — HIGH (ref 0–41)
BASOPHILS # BLD AUTO: 0.06 K/UL — SIGNIFICANT CHANGE UP (ref 0–0.2)
BASOPHILS NFR BLD AUTO: 0.5 % — SIGNIFICANT CHANGE UP (ref 0–1)
BILIRUB SERPL-MCNC: 1.1 MG/DL — SIGNIFICANT CHANGE UP (ref 0.2–1.2)
BUN SERPL-MCNC: 15 MG/DL — SIGNIFICANT CHANGE UP (ref 10–20)
CALCIUM SERPL-MCNC: 9.2 MG/DL — SIGNIFICANT CHANGE UP (ref 8.5–10.1)
CHLORIDE SERPL-SCNC: 104 MMOL/L — SIGNIFICANT CHANGE UP (ref 98–110)
CO2 SERPL-SCNC: 28 MMOL/L — SIGNIFICANT CHANGE UP (ref 17–32)
CREAT SERPL-MCNC: 1.1 MG/DL — SIGNIFICANT CHANGE UP (ref 0.7–1.5)
EOSINOPHIL # BLD AUTO: 0.36 K/UL — SIGNIFICANT CHANGE UP (ref 0–0.7)
EOSINOPHIL NFR BLD AUTO: 3.2 % — SIGNIFICANT CHANGE UP (ref 0–8)
GLUCOSE SERPL-MCNC: 111 MG/DL — HIGH (ref 70–99)
HCT VFR BLD CALC: 40 % — LOW (ref 42–52)
HGB BLD-MCNC: 12.4 G/DL — LOW (ref 14–18)
IMM GRANULOCYTES NFR BLD AUTO: 0.5 % — HIGH (ref 0.1–0.3)
INR BLD: 1.1 RATIO — SIGNIFICANT CHANGE UP (ref 0.65–1.3)
LYMPHOCYTES # BLD AUTO: 19.5 % — LOW (ref 20.5–51.1)
LYMPHOCYTES # BLD AUTO: 2.17 K/UL — SIGNIFICANT CHANGE UP (ref 1.2–3.4)
MCHC RBC-ENTMCNC: 25.9 PG — LOW (ref 27–31)
MCHC RBC-ENTMCNC: 31 G/DL — LOW (ref 32–37)
MCV RBC AUTO: 83.5 FL — SIGNIFICANT CHANGE UP (ref 80–94)
MONOCYTES # BLD AUTO: 1.06 K/UL — HIGH (ref 0.1–0.6)
MONOCYTES NFR BLD AUTO: 9.5 % — HIGH (ref 1.7–9.3)
NEUTROPHILS # BLD AUTO: 7.4 K/UL — HIGH (ref 1.4–6.5)
NEUTROPHILS NFR BLD AUTO: 66.8 % — SIGNIFICANT CHANGE UP (ref 42.2–75.2)
NRBC # BLD: 0 /100 WBCS — SIGNIFICANT CHANGE UP (ref 0–0)
PLATELET # BLD AUTO: 280 K/UL — SIGNIFICANT CHANGE UP (ref 130–400)
POTASSIUM SERPL-MCNC: 4.1 MMOL/L — SIGNIFICANT CHANGE UP (ref 3.5–5)
POTASSIUM SERPL-SCNC: 4.1 MMOL/L — SIGNIFICANT CHANGE UP (ref 3.5–5)
PROT SERPL-MCNC: 6.6 G/DL — SIGNIFICANT CHANGE UP (ref 6–8)
PROTHROM AB SERPL-ACNC: 12.7 SEC — SIGNIFICANT CHANGE UP (ref 9.95–12.87)
RBC # BLD: 4.79 M/UL — SIGNIFICANT CHANGE UP (ref 4.7–6.1)
RBC # FLD: 13.3 % — SIGNIFICANT CHANGE UP (ref 11.5–14.5)
SODIUM SERPL-SCNC: 144 MMOL/L — SIGNIFICANT CHANGE UP (ref 135–146)
TROPONIN T SERPL-MCNC: 0.09 NG/ML — CRITICAL HIGH
WBC # BLD: 11.1 K/UL — HIGH (ref 4.8–10.8)
WBC # FLD AUTO: 11.1 K/UL — HIGH (ref 4.8–10.8)

## 2020-09-11 PROCEDURE — 71275 CT ANGIOGRAPHY CHEST: CPT | Mod: 26

## 2020-09-11 PROCEDURE — 71045 X-RAY EXAM CHEST 1 VIEW: CPT | Mod: 26

## 2020-09-11 PROCEDURE — 99285 EMERGENCY DEPT VISIT HI MDM: CPT

## 2020-09-11 RX ORDER — ONDANSETRON 8 MG/1
4 TABLET, FILM COATED ORAL ONCE
Refills: 0 | Status: COMPLETED | OUTPATIENT
Start: 2020-09-11 | End: 2020-09-11

## 2020-09-11 RX ORDER — FAMOTIDINE 10 MG/ML
20 INJECTION INTRAVENOUS ONCE
Refills: 0 | Status: COMPLETED | OUTPATIENT
Start: 2020-09-11 | End: 2020-09-11

## 2020-09-11 RX ORDER — MORPHINE SULFATE 50 MG/1
4 CAPSULE, EXTENDED RELEASE ORAL ONCE
Refills: 0 | Status: DISCONTINUED | OUTPATIENT
Start: 2020-09-11 | End: 2020-09-11

## 2020-09-11 RX ADMIN — MORPHINE SULFATE 4 MILLIGRAM(S): 50 CAPSULE, EXTENDED RELEASE ORAL at 21:49

## 2020-09-11 RX ADMIN — FAMOTIDINE 100 MILLIGRAM(S): 10 INJECTION INTRAVENOUS at 21:19

## 2020-09-11 RX ADMIN — MORPHINE SULFATE 4 MILLIGRAM(S): 50 CAPSULE, EXTENDED RELEASE ORAL at 21:19

## 2020-09-11 RX ADMIN — ONDANSETRON 4 MILLIGRAM(S): 8 TABLET, FILM COATED ORAL at 21:02

## 2020-09-11 NOTE — ED PROVIDER NOTE - PSH
Cardiac arrhythmia  bypass surgery 9 years ago  Coronary artery disease involving other coronary artery bypass graft, angina presence unspecified    H/O right coronary artery stent placement  August 17, 2020 Cardiac arrhythmia  bypass surgery 9 years ago  Coronary artery disease involving other coronary artery bypass graft, angina presence unspecified    H/O right coronary artery stent placement  August 17, 2020

## 2020-09-11 NOTE — ED ADULT NURSE NOTE - PSH
Cardiac arrhythmia  bypass surgery 9 years ago  Coronary artery disease involving other coronary artery bypass graft, angina presence unspecified    H/O right coronary artery stent placement  August 17, 2020

## 2020-09-11 NOTE — ED PROVIDER NOTE - PMH
Cardiac abnormality    Chronic gastric ulcer without hemorrhage and without perforation    Chronic obstructive pulmonary disease    GERD (gastroesophageal reflux disease)    Heart attack    High cholesterol    Ulcer of esophagus Cardiac abnormality    Chronic gastric ulcer without hemorrhage and without perforation    Chronic obstructive pulmonary disease    GERD (gastroesophageal reflux disease)    Heart attack    High cholesterol    Ulcer of esophagus

## 2020-09-11 NOTE — ED PROVIDER NOTE - ATTENDING CONTRIBUTION TO CARE
I was present for and supervised the key and critical aspects of the procedures performed during the care of the patient. patient is a 61 yo who presents for evaluation of xiphoid chest pain that started 1 day prior patient is s/p recent MI he denies any sob he denies any fevers or chills he denies any abdominal pain or back pain  On physical exam the patient is nc/at perrla eomi oropharynx clear cta b/l, rrr s1s2 noted abd-soft no ttp of the abdomen no guarding no rebound ext from   a/p- we obtained ekg labs including troponin patient further stated that pain intermittently radiates to his back at which point we obtained cta dissection protocol which is negative   based on his recent history I will admit for further workup at this time

## 2020-09-11 NOTE — ED PROVIDER NOTE - OBJECTIVE STATEMENT
patient is a 59 yo who presents for evaluation of xiphoid chest pain that started 1 day prior patient is s/p recent MI he denies any sob he denies any fevers or chills he denies any abdominal pain or back pain

## 2020-09-11 NOTE — ED ADULT NURSE NOTE - NSFALLRSKOUTCOME_ED_ALL_ED
Pre-Operative H & P     CC:  Preoperative exam to assess for increased cardiopulmonary risk while undergoing surgery and anesthesia.    Date of Encounter: May 21, 2018   Primary Care Physician:  Kyara Quiñones   Reason for Visit/Surgery:  Other idiopathic scoliosis, thoracic region [M41.24]      HPI  Jonathon Conde is a 30 year old male who presents for pre-operative H & P in preparation for a Stealth Assisted Posterior Spinal Fusion Thoracic 1-2/Thoracic 12, [facility]otomies Thoracic 2-3-Thoracic 10 on 6/12/18 with Dr. Xavier for scoliosis.    Mr. Conde was first diagnosed with scoliosis at the age of 21.  He has always noted back pain in the thoracic area and over the past few years his symptoms have worsened.  He finds it extremely uncomfortable to sleep at night.  He notices the deformity with uneven shoulders.   He does not currently take any prescription or over-the-counter medications to alleviate the pain as Tylenol and ibuprofen do not work.    He does use marijuana to help dull the pain at night.  He is looking for definitive relief of his symptoms, so the above surgery has been scheduled.        History is obtained from the patient and  medical record including Care Everywhere.        Past Medical History  Past Medical History:   Diagnosis Date     Scoliosis         Past Surgical History  Past Surgical History:   Procedure Laterality Date     CLAVICLE SURGERY Right Age 15     wisdom teeth      18 years old       Hx of Blood transfusions/reactions: No transfusion history       Personal or FH with difficulty with Anesthesia:  None    Prior to Admission Medications  No current outpatient prescriptions on file.         Allergies  Milk protein extract and Sulfa drugs     Social History  Social History     Social History     Marital status: Single     Spouse name: N/A     Number of children: N/A     Years of education: N/A     Occupational History     Not on file.     Social History Main  "Topics     Smoking status: Former Smoker     Packs/day: 0.25     Years: 13.00     Types: Cigarettes     Quit date: 2/21/2018     Smokeless tobacco: Current User     Alcohol use 3.6 oz/week     6 Cans of beer per week     Drug use: Yes     Special: Marijuana      Comment: evenings     Sexual activity: Yes     Partners: Female     Other Topics Concern     Not on file     Social History Narrative          Family History  No family history of bleeding, clotting disorders or complications with anesthesia.    ROS/MED HX     ENT/Pulmonary:     (+)tobacco use, Past use , . .    Neurologic:  - neg neurologic ROS     Cardiovascular:  - neg cardiovascular ROS     METS/Exercise Tolerance:  >4 METS   Hematologic:  - neg hematologic  ROS     Musculoskeletal:   (+) , , other musculoskeletal- Chronic back pain due to scoliosis    GI/Hepatic:  - neg GI/hepatic ROS     Renal/Genitourinary:  - ROS Renal section negative     Endo:  - neg endo ROS     Psychiatric:       Infectious Disease:  - neg infectious disease ROS     Malignancy:      - no malignancy   Other:    (+) H/O Chronic Pain,         Cardiology Tests: (personally reviewed):   Review Results Below in A/P    Labs: (personally reviewed):  Lab Results   Component Value Date    WBC 8.9 05/21/2018    HGB 15.7 05/21/2018    HCT 46.4 05/21/2018     05/21/2018    INR 1.01 05/21/2018     05/21/2018    POTASSIUM 4.2 05/21/2018    BELKYS 9.4 05/21/2018    GLC 87 05/21/2018    CR 1.02 05/21/2018    BUN 10 05/21/2018    CO2 27 05/21/2018      UA RESULTS:  Recent Labs   Lab Test  05/21/18   1016   COLOR  Yellow   APPEARANCE  Clear   URINEGLC  Negative   URINEBILI  Negative   URINEKETONE  Negative   SG  1.018   UBLD  Negative   URINEPH  6.0   PROTEIN  Negative   NITRITE  Negative   LEUKEST  Negative           Physical Exam:  No LMP for male patient.   Vital signs:  /81  Pulse 66  Temp 97.9  F (36.6  C) (Oral)  Resp 16  Ht 1.905 m (6' 3\")  Wt 84.4 kg (186 lb 1.6 oz)  " SpO2 98%  BMI 23.26 kg/m2    Constitutional: Awake, alert, cooperative, no apparent distress, and appears stated age.  Eyes: Pupils equal, round and reactive to light, sclera clear, conjunctiva normal.  HENT: Normocephalic, oral pharynx with moist mucus membranes. No goiter appreciated.   Respiratory: Clear to auscultation bilaterally, no crackles or wheezing.  Cardiovascular: Regular rate and rhythm and no overt murmur noted.  No carotid bruits auscultated. No edema. Palpable pulses to radial  DP and PT arteries.   GI: Normal bowel sounds, soft, non-distended, non-tender, no masses palpated  Skin: Warm and dry.  No rashes at anticipated surgical site.   Musculoskeletal: Full extension of the neck.  No redness, warmth, or swelling of exposed joints noted. Gross motor strength is normal.    Neurologic: Awake, alert, oriented to name, place and time.  Gait is normal.   Neuropsychiatric: Calm, cooperative. Normal affect.     Assessment/Plan  Jonathon Conde is a 30 year old male who presents for pre-operative H & P in preparation for a Stealth Assisted Posterior Spinal Fusion Thoracic 1-2/Thoracic 12, [facility] Osteotomies Thoracic 2-3-Thoracic 10 on 6/12/18 with Dr. Xavier for scoliosis.   PAC referral for risk assessment and optimization for anesthesia with comorbid conditions of:    Pre-operative considerations:  1.  Cardiac:  Functional status METS>4    Risk of Major Adverse Cardiac event: 0.4%  -No known cardiac disease  2.  Pulm:   MARY risk:  low  -3 pack year smoking history, quit 2/2018; No known pulmonary disease    3.  GI:  Risk of PONV score = 2 .  If > 2, anti-emetic intervention recommended.    Patient is optimized and is an acceptable candidate for the proposed procedure.  No further diagnostic evaluation is needed.      AVS given to patient regarding medication instructions,  surgery time/arrival time and NPO status.  Ignacia Marie MS PA-C   Preoperative Assessment Center  [facility]  [place]  [facility]  Phone: [phone]  Fax: [phone]     Universal Safety Interventions

## 2020-09-11 NOTE — ED ADULT NURSE NOTE - CHIEF COMPLAINT QUOTE
Came in for complaints of sternal pain started 2 hours ago. Pt. took gas pills and chewable aspirin at home. Pt. has history of Heart attack 3 weeks ago

## 2020-09-11 NOTE — ED ADULT NURSE NOTE - PMH
Cardiac abnormality    Chronic gastric ulcer without hemorrhage and without perforation    Chronic obstructive pulmonary disease    GERD (gastroesophageal reflux disease)    Heart attack    High cholesterol    Ulcer of esophagus

## 2020-09-11 NOTE — ED PROVIDER NOTE - CLINICAL SUMMARY MEDICAL DECISION MAKING FREE TEXT BOX
based on patients history and the fact that he had a recent MI with xyphoid chest pain I will admit for further workup   we obtained ekg which appears unchanged in addition we obtained ct dissection and have reviewed prior labs which reveal prior increased lfts I will admit for further workup.

## 2020-09-11 NOTE — ED ADULT TRIAGE NOTE - CHIEF COMPLAINT QUOTE
Came in for complaints of sternal pain started 2 hours ago. Pt. took gas pills and chewable aspirin at home. Pt. has history of Heart attack 3 weeks ago Came in for complaints of sternal pain started 2 hours ago. Pt. took gas pills and chewable aspirin at home but no relief. Pt. has history of Heart attack 3 weeks ago

## 2020-09-12 LAB — TROPONIN T SERPL-MCNC: 0.06 NG/ML — CRITICAL HIGH

## 2020-09-12 PROCEDURE — 99223 1ST HOSP IP/OBS HIGH 75: CPT

## 2020-09-12 PROCEDURE — 76705 ECHO EXAM OF ABDOMEN: CPT | Mod: 26

## 2020-09-12 RX ORDER — LOSARTAN POTASSIUM 100 MG/1
50 TABLET, FILM COATED ORAL DAILY
Refills: 0 | Status: DISCONTINUED | OUTPATIENT
Start: 2020-09-12 | End: 2020-09-14

## 2020-09-12 RX ORDER — METOPROLOL TARTRATE 50 MG
25 TABLET ORAL EVERY 12 HOURS
Refills: 0 | Status: DISCONTINUED | OUTPATIENT
Start: 2020-09-12 | End: 2020-09-14

## 2020-09-12 RX ORDER — PANTOPRAZOLE SODIUM 20 MG/1
40 TABLET, DELAYED RELEASE ORAL
Refills: 0 | Status: DISCONTINUED | OUTPATIENT
Start: 2020-09-12 | End: 2020-09-14

## 2020-09-12 RX ORDER — TAMSULOSIN HYDROCHLORIDE 0.4 MG/1
0.4 CAPSULE ORAL AT BEDTIME
Refills: 0 | Status: DISCONTINUED | OUTPATIENT
Start: 2020-09-12 | End: 2020-09-14

## 2020-09-12 RX ORDER — TICAGRELOR 90 MG/1
90 TABLET ORAL EVERY 12 HOURS
Refills: 0 | Status: DISCONTINUED | OUTPATIENT
Start: 2020-09-12 | End: 2020-09-12

## 2020-09-12 RX ORDER — ASPIRIN/CALCIUM CARB/MAGNESIUM 324 MG
81 TABLET ORAL DAILY
Refills: 0 | Status: DISCONTINUED | OUTPATIENT
Start: 2020-09-12 | End: 2020-09-14

## 2020-09-12 RX ORDER — ENOXAPARIN SODIUM 100 MG/ML
40 INJECTION SUBCUTANEOUS EVERY 24 HOURS
Refills: 0 | Status: DISCONTINUED | OUTPATIENT
Start: 2020-09-12 | End: 2020-09-14

## 2020-09-12 RX ORDER — TICAGRELOR 90 MG/1
90 TABLET ORAL EVERY 12 HOURS
Refills: 0 | Status: DISCONTINUED | OUTPATIENT
Start: 2020-09-12 | End: 2020-09-14

## 2020-09-12 RX ADMIN — Medication 25 MILLIGRAM(S): at 18:17

## 2020-09-12 RX ADMIN — ENOXAPARIN SODIUM 40 MILLIGRAM(S): 100 INJECTION SUBCUTANEOUS at 13:05

## 2020-09-12 RX ADMIN — LOSARTAN POTASSIUM 50 MILLIGRAM(S): 100 TABLET, FILM COATED ORAL at 13:05

## 2020-09-12 RX ADMIN — TAMSULOSIN HYDROCHLORIDE 0.4 MILLIGRAM(S): 0.4 CAPSULE ORAL at 21:09

## 2020-09-12 RX ADMIN — Medication 10 MILLIGRAM(S): at 13:05

## 2020-09-12 RX ADMIN — PANTOPRAZOLE SODIUM 40 MILLIGRAM(S): 20 TABLET, DELAYED RELEASE ORAL at 13:05

## 2020-09-12 RX ADMIN — Medication 81 MILLIGRAM(S): at 13:05

## 2020-09-12 RX ADMIN — TICAGRELOR 90 MILLIGRAM(S): 90 TABLET ORAL at 18:17

## 2020-09-12 NOTE — CONSULT NOTE ADULT - SUBJECTIVE AND OBJECTIVE BOX
Chief complaint/Reason for consult: Transaminitis     HPI: 60yMale pmh MI three and a half weeks ago, GERD presents RUQ and epigastric pain since yesterday that was a 7/10 non radiating. Patient reports pain was 7/10 not worse with food and not radiating. Patient reports pain resolved with morphine and he states he does not need morphine anymore and his pain is now 0/10. Patient denies nausea, vomiting, hematemesis, melena, blood in stool, diarrhea, constipation, abdominal pain.      PAST MEDICAL & SURGICAL HISTORY:   Heart attack    Ulcer of esophagus    Chronic gastric ulcer without hemorrhage and without perforation    High cholesterol    GERD (gastroesophageal reflux disease)    Cardiac abnormality    Chronic obstructive pulmonary disease    H/O right coronary artery stent placement  August 17, 2020    Cardiac arrhythmia  bypass surgery 9 years ago    Coronary artery disease involving other coronary artery bypass graft, angina presence unspecified          Family history:  FAMILY HISTORY:  FH: CAD (coronary artery disease) (Grandparent)      No GI cancers in first or second degree relatives    Social History: No smoking. No alcohol. No illegal drug use.    Allergies:  No Known Allergies      Home Medications:  Janumet 50 mg-500 mg oral tablet: 1 tab(s) orally 2 times a day (16 Aug 2020 16:34)  Paxil 10 mg oral tablet: 1 tab(s) orally once a day (16 Aug 2020 16:34)    MEDICATIONS  (STANDING):  aspirin  chewable 81 milliGRAM(s) Oral daily  enoxaparin Injectable 40 milliGRAM(s) SubCutaneous every 24 hours  losartan 50 milliGRAM(s) Oral daily  metoprolol tartrate 25 milliGRAM(s) Oral every 12 hours  pantoprazole    Tablet 40 milliGRAM(s) Oral before breakfast  PARoxetine 10 milliGRAM(s) Oral daily  tamsulosin 0.4 milliGRAM(s) Oral at bedtime  ticagrelor 90 milliGRAM(s) Oral every 12 hours        REVIEW OF SYSTEMS  General:  No weight loss, fevers, or chills.  Eyes:  No reported pain or visual changes  ENT:  No sore throat or runny nose.  NECK: No stiffness or lymphadenopathy  CV:  No chest pain or palpitations.  Resp:  No shortness of breath, cough, wheezing or hemoptysis  GI:  No abdominal pain, nausea, vomiting, dysphagia, diarrhea or constipation. No rectal bleeding, melena, or hematemesis.  Muscle:  No aches or weakness  Neuro:  No tingling, numbness       VITALS:   T(F): 96.9 (09-12-20 @ 14:10), Max: 96.9 (09-12-20 @ 05:00)  HR: 61 (09-12-20 @ 14:10) (54 - 78)  BP: 116/58 (09-12-20 @ 14:10) (116/58 - 133/72)  RR: 18 (09-12-20 @ 14:10) (15 - 18)  SpO2: 100% (09-11-20 @ 20:10) (100% - 100%)    PHYSICAL EXAM:  GENERAL: AAOx3, no acute distress.  HEAD:  Atraumatic, Normocephalic  EYES: conjunctiva and sclera clear  NECK: Supple, No thyromegaly   CHEST/LUNG: Clear to auscultation bilaterally; No wheeze, rhonchi, or rales  HEART: Regular rate and rhythm; normal S1, S2, No murmurs.  ABDOMEN: Soft, nontender, nondistended; Bowel sounds present, no abdominal bruit, masses, or hepatosplenomegaly  EXTREMITIES:  2+ Peripheral Pulses. No clubbing, cyanosis, or edema, warm   NEUROLOGY: No asterixis or tremor  SKIN: Intact, no jaundice          LABS:  09-12    143  |  104  |  14  ----------------------------<  116<H>  4.5   |  30  |  1.1    Ca    9.2      12 Sep 2020 10:08  Mg     2.3     09-12    TPro  6.2  /  Alb  3.8  /  TBili  1.5<H>  /  DBili  0.8<H>  /  AST  524<H>  /  ALT  659<H>  /  AlkPhos  196<H>  09-12                          12.4   6.96  )-----------( 246      ( 12 Sep 2020 10:08 )             39.5     LIVER FUNCTIONS - ( 12 Sep 2020 10:08 )  Alb: 3.8 g/dL / Pro: 6.2 g/dL / ALK PHOS: 196 U/L / ALT: 659 U/L / AST: 524 U/L / GGT: 378 U/L       PT/INR - ( 11 Sep 2020 21:03 )   PT: 12.70 sec;   INR: 1.10 ratio         PTT - ( 11 Sep 2020 21:03 )  PTT:30.7 sec    IMAGING:    < from: CT Angio Chest Dissection Protocol (09.11.20 @ 22:25) >  EXAM:  CTA DISSECTION             PROCEDURE DATE:  09/11/2020            INTERPRETATION:  REASON FOR EXAM / CLINICAL STATEMENT: Chest pain. Clinical concern for aortic dissection. PMHx Chronic gastric ulcer; COPD, GERD, Ulcer of esophagus, S/P coronary artery stent placement August 17, 2020.    TECHNIQUE: Contiguous axial CT images were obtained from the thoracic inlet to the mid-pelvis without and with intravenous contrast.  Reformatted images in the coronal and sagittal planes were acquired, as well as 3DMIP reconstructed images.    COMPARISON CT: Chest CTA dated 2/17/2019      FINDINGS CHEST:    HEART AND VESSELS: The heart is normal in size. There are coronary artery calcifications and stents. There is no pericardial effusion.    Mild aortic calcifications are noted. There is no evidence of thoracic aortic aneurysm or dissection. No intimal flap or double lumen. No displacement of atherosclerotic calcifications into the lumen    The ascending thoracic aorta measures 2.9 cm; the descending thoracic aorta measures 2.6 cm. The main pulmonary artery measures 2.6 cm.    Brachiocephalic vessels are unremarkable.    No evidence of central pulmonary embolism.    LUNGS: The previously noted right middle lobe 3 mm pulmonary nodule (series3 image 131) is stable since 2/17/2019. The right upper lobe nodules are not identified on this study. Mild linear opacities compatible with scarring is noted in the region of the lingula. The lungs are otherwise clear.    PLEURA/PERICARDIUM: Unremarkable.    MEDIASTINUM/THORACIC NODES: Unremarkable.      FINDINGS ABDOMEN AND PELVIS:    HEPATIC: The liver is normal in appearance with no evidence of solid mass or bile duct dilatation. The 1.4 cm cyst in the left lobe of the liver is again noted. The portal vein is patent. The hepatic veins are opacified.    BILIARY:Cholelithiasis.    SPLEEN: Unremarkable.    PANCREAS: Unremarkable.    ADRENAL GLANDS: The right adrenal gland is unremarkable. 1 cm nonspecific nodule in the left adrenal gland.    KIDNEYS: Unremarkable.    ABDOMINOPELVIC NODES: Unremarkable.    PELVIC ORGANS: Unremarkable.    PERITONEUM/MESENTERY/BOWEL: Small hiatal hernia with fluid noted within the esophagus. No evidence of obstruction, colitis, inflammatory process, or ascites within the abdomen or pelvis.    BONES/SOFT TISSUES: Degenerative changes of the spine.    VASCULAR: Vascular calcifications are noted with no evidence of abdominal aortic aneurysm or dissection. No intimal flap or double lumen. No displacement ofatherosclerotic calcifications into the lumen.  The celiac axis, the superior mesenteric artery, the inferior mesenteric artery, and the renal arteries are patent without flow-limiting stenosis. Single renal arteries are seen bilaterally.    IMPRESSION:    No evidence of aortic aneurysm or dissection.    Cholelithiasis.                    SHELIA PERALTA M.D., ATTENDING RADIOLOGIST  This document has been electronically signed. Sep 11 2020 11:14PM    < end of copied text >      < from: US Abdomen Limited (09.12.20 @ 09:34) >  EXAM:  US ABDOMEN LIMITED            PROCEDURE DATE:  09/12/2020            INTERPRETATION:  CLINICAL HISTORY: Abdominal pain.    COMPARISON: CT dated 9/11/2020    PROCEDURE: Ultrasound of the right upper quadrant was performed.    FINDINGS:    LIVER: Measures 15.5 cm in length with increased echogenicity, compatible with steatosis.    GALLBLADDER/BILIARY TREE:  Cholelithiasis and sludge. No wall thickening or pericholecystic fluid. Negative sonographic Sheth's sign. No intrahepatic biliary ductal dilatation. The common bile duct measures 5 mm, which is normal.    PANCREAS: Obscured.    KIDNEY:  Right kidney measures 10.3 cm in length. No hydronephrosis.    ASCITES:  None.    IMPRESSION:    Cholelithiasis and sludge without sonographic evidence of acute cholecystitis. No biliary ductal dilatation.    Hepatic steatosis.            FEDE DIAZ M.D., RESIDENT RADIOLOGIST  This document has been electronically signed.  ARIADNE BABCOCK M.D., ATTENDING RADIOLOGIST  This document has been electronically signed. Sep 12 2020 11:04AM    < end of copied text >

## 2020-09-12 NOTE — PROGRESS NOTE ADULT - SUBJECTIVE AND OBJECTIVE BOX
CHUNKATH  60y, Male  Allergy: No Known Allergies    Hospital Day:     Patient seen and examined. Patient endorses that the epigastric pain has resolved since yesterday. He does not feel like this pain is similar to his prior MI. MICHELL.     PMH/PSH:  PAST MEDICAL & SURGICAL HISTORY:  Heart attack    Ulcer of esophagus    Chronic gastric ulcer without hemorrhage and without perforation    High cholesterol    GERD (gastroesophageal reflux disease)    Cardiac abnormality    Chronic obstructive pulmonary disease    H/O right coronary artery stent placement  August 17, 2020    Cardiac arrhythmia  bypass surgery 9 years ago    Coronary artery disease involving other coronary artery bypass graft, angina presence unspecified        LAST 24-Hr EVENTS:    VITALS:  T(F): 96.9 (09-12-20 @ 14:10), Max: 96.9 (09-12-20 @ 05:00)  HR: 61 (09-12-20 @ 14:10)  BP: 116/58 (09-12-20 @ 14:10) (116/58 - 133/72)  RR: 18 (09-12-20 @ 14:10)  SpO2: 97% (09-12-20 @ 14:10)        TESTS & MEASUREMENTS:  Weight (Kg): 90.7 (09-11-20 @ 20:10)  BMI (kg/m2): 31.3 (09-11)                          12.4   6.96  )-----------( 246      ( 12 Sep 2020 10:08 )             39.5     PT/INR - ( 11 Sep 2020 21:03 )   PT: 12.70 sec;   INR: 1.10 ratio         PTT - ( 11 Sep 2020 21:03 )  PTT:30.7 sec  09-12    143  |  104  |  14  ----------------------------<  116<H>  4.5   |  30  |  1.1    Ca    9.2      12 Sep 2020 10:08  Mg     2.3     09-12    TPro  6.2  /  Alb  3.8  /  TBili  1.5<H>  /  DBili  0.8<H>  /  AST  524<H>  /  ALT  659<H>  /  AlkPhos  196<H>  09-12    LIVER FUNCTIONS - ( 12 Sep 2020 10:08 )  Alb: 3.8 g/dL / Pro: 6.2 g/dL / ALK PHOS: 196 U/L / ALT: 659 U/L / AST: 524 U/L / GGT: 378 U/L       CARDIAC MARKERS ( 12 Sep 2020 15:28 )  x     / 0.06 ng/mL / x     / x     / x      CARDIAC MARKERS ( 12 Sep 2020 10:08 )  x     / 0.07 ng/mL / 48 U/L / x     / x      CARDIAC MARKERS ( 11 Sep 2020 21:03 )  x     / 0.09 ng/mL / x     / x     / x                          RADIOLOGY, ECG, & ADDITIONAL TESTS:      RECENT DIAGNOSTIC ORDERS:  Activated Partial Thromboplastin Time:  Start Date:12-Sep-2020. AM Sched. Collection:13-Sep-2020 04:30 (09-12-20 @ 17:20)  Prothrombin Time and INR, Plasma:  Start Date:12-Sep-2020. AM Sched. Collection:13-Sep-2020 04:30 (09-12-20 @ 17:20)  Comprehensive Metabolic Panel: AM Sched. Collection: 13-Sep-2020 04:30 (09-12-20 @ 17:20)  Complete Blood Count: AM Sched. Collection: 13-Sep-2020 04:30 (09-12-20 @ 17:20)  MR Abdomen No Cont: Routine   Indication: MRCP  Transport: Stretcher-Crib (09-12-20 @ 17:18)  12 Lead ECG:   Addl Info: complete at 3p (09-12-20 @ 13:09)  Diet, Regular:   Consistent Carbohydrate No Snacks  DASH/TLC Sodium & Cholesterol Restricted (09-12-20 @ 10:31)  C-Reactive Protein, Serum: 10:08 (09-12-20 @ 10:14)      MEDICATIONS:  MEDICATIONS  (STANDING):  aspirin  chewable 81 milliGRAM(s) Oral daily  enoxaparin Injectable 40 milliGRAM(s) SubCutaneous every 24 hours  losartan 50 milliGRAM(s) Oral daily  metoprolol tartrate 25 milliGRAM(s) Oral every 12 hours  pantoprazole    Tablet 40 milliGRAM(s) Oral before breakfast  PARoxetine 10 milliGRAM(s) Oral daily  tamsulosin 0.4 milliGRAM(s) Oral at bedtime  ticagrelor 90 milliGRAM(s) Oral every 12 hours    MEDICATIONS  (PRN):      HOME MEDICATIONS:  Janumet 50 mg-500 mg oral tablet (08-16)  Paxil 10 mg oral tablet (08-16)      PHYSICAL EXAM:  GENERAL: A&O x3,  in NAD  HNENT: EOMI, PERRLA    NECK: No LAD/swelling  CHEST/LUNG:  CTAB no wheezes/rales/ronchi  HEART: RRR, No murmurs  ABDOMEN: Soft, NT, ND,  Bowel sounds present  EXTREMITIES:  Warm well perfused, no edema

## 2020-09-12 NOTE — CONSULT NOTE ADULT - ASSESSMENT
60yMale [facility] three and a half weeks ago, GERD presents RUQ and epigastric pain since yesterday that was a 7/10 non radiating. Patient reports pain was 7/10 not worse with food and not radiating. Patient reports pain resolved with morphine and he states he does not need morphine anymore and his pain is now 0/10.    Problem 1-Transaminitis Cholelithiasis, Hepatic Steatosis, initially with RUQ and epigastric pain  DDX distal CBD stone, DILI  Rec  -Patient will need MRCP if MRCP shows Choledocholithiasis patient will need ERCP for which patient will be transferred to [facility] and have ERCP by the [facility] Advanced GI team at a date to be determined by them and patient will return to [facility] S/P ERCP procedure.   -RUQ ultrasound appreciated CBD 5mm not dilated    -Dietary and lifestyle modifications advised   -Check Hepatitis B Sag, Hep B SAB, Hep A Ab, Hep C Ab,Smooth Muscle Antibody,  AMA, ERWIN, Ceruloplasmin, Ferritin, Alpha-1-antitrypsin, CMV, Herpes serologies, EBV serologies,   -Avoid hepatotoxic medications  -Trend LFTs and INR daily  -Monitor CBC  -Notify GI STAT if any signs of hemodynamic instability 60yMale [facility] three and a half weeks ago, GERD presents RUQ and epigastric pain since yesterday that was a 7/10 non radiating. Patient reports pain was 7/10 not worse with food and not radiating. Patient reports pain resolved with morphine and he states he does not need morphine anymore and his pain is now 0/10.    Problem 1-Transaminitis Cholelithiasis, Hepatic Steatosis, initially with RUQ and epigastric pain  DDX distal CBD stone, DILI, Ischemic hepatitis   Rec  -Patient will need MRCP if MRCP shows Choledocholithiasis patient will need ERCP for which patient will be transferred to [facility] and have ERCP by the [facility] GI team at a date to be determined by them and patient will return to [facility] S/P ERCP procedure.   -RUQ ultrasound appreciated CBD 5mm not dilated    -Dietary and lifestyle modifications advised   -Check Hepatitis B Sag, Hep B SAB, Hep A Ab, Hep C Ab,Smooth Muscle Antibody,  AMA, ERWIN, Ceruloplasmin, Ferritin, Alpha-1-antitrypsin, CMV, Herpes serologies, EBV serologies,   -Avoid hepatotoxic medications  -Trend LFTs and INR daily  -Monitor CBC  -Notify GI STAT if any signs of hemodynamic instability

## 2020-09-12 NOTE — CONSULT NOTE ADULT - PROVIDER SPECIALTY LIST ADULT
DAMIAN left [facility] for Ceci [facility] to [facility] on [facility]. DAMIAN waiting to hear back.    Mary Shoemaker, CHAVEZ  [facility]  Ext. [phone]     Gastroenterology

## 2020-09-12 NOTE — PROGRESS NOTE ADULT - ASSESSMENT
Pt is a 61yo male with pmh of esophageal ulcer, GERD, HLD, COPD, CAD s/p CABG,  MI with RCA stent 1m ago, who presents for epigastric/ chest pain.     # Chest Pain   # Epigastric Pain   #Transaminitis   - troponin downtrending from prior   - EKG reviewed by Cardiologist   - pt with transaminitis, elevated GGT and elevated Bili c/f GI choledocholithiasis   - RUQ with cholelithiasis and hepatic steatosis   - GI consulted: MRCP pending       #CAD s/p CABG   #Recent MI w/ RCA stent   - continue ASA, TIcagrelor  - holding statin due to transaminitis   #Progress Note Handoff:  Pending (specify):  Consults_________, Tests________, Test Results_______, Other_________  Family discussion:  Disposition: Home___/SNF___/Other________/Unknown at this time________- continue losartan and metoprolol     #HLD   - holding statin     #GERD   -pantoprazole     #BPH  - tamsulosin     #Mood Disorder   - continue Paroxetine     DVT ppx: enoxaparin       #Progress Note Handoff:  Pending (specify):  Consults_________, Tests________, Test Results_______, Other_____MRCP____  Family discussion: discussed with pt   Disposition: Home___/SNF___/Other________/Unknown at this time___x_____      Marianne Aiken DO

## 2020-09-13 LAB
ALBUMIN SERPL ELPH-MCNC: 3.8 G/DL — SIGNIFICANT CHANGE UP (ref 3.5–5.2)
ALP SERPL-CCNC: 284 U/L — HIGH (ref 30–115)
ALT FLD-CCNC: >700 U/L — HIGH (ref 0–41)
ANION GAP SERPL CALC-SCNC: 11 MMOL/L — SIGNIFICANT CHANGE UP (ref 7–14)
APTT BLD: 36.5 SEC — SIGNIFICANT CHANGE UP (ref 27–39.2)
AST SERPL-CCNC: 458 U/L — HIGH (ref 0–41)
BILIRUB SERPL-MCNC: 1.2 MG/DL — SIGNIFICANT CHANGE UP (ref 0.2–1.2)
BUN SERPL-MCNC: 13 MG/DL — SIGNIFICANT CHANGE UP (ref 10–20)
CALCIUM SERPL-MCNC: 9.2 MG/DL — SIGNIFICANT CHANGE UP (ref 8.5–10.1)
CHLORIDE SERPL-SCNC: 106 MMOL/L — SIGNIFICANT CHANGE UP (ref 98–110)
CO2 SERPL-SCNC: 27 MMOL/L — SIGNIFICANT CHANGE UP (ref 17–32)
CREAT SERPL-MCNC: 1.2 MG/DL — SIGNIFICANT CHANGE UP (ref 0.7–1.5)
GLUCOSE SERPL-MCNC: 131 MG/DL — HIGH (ref 70–99)
HCT VFR BLD CALC: 41.1 % — LOW (ref 42–52)
HGB BLD-MCNC: 12.5 G/DL — LOW (ref 14–18)
INR BLD: 1.07 RATIO — SIGNIFICANT CHANGE UP (ref 0.65–1.3)
MCHC RBC-ENTMCNC: 26 PG — LOW (ref 27–31)
MCHC RBC-ENTMCNC: 30.4 G/DL — LOW (ref 32–37)
MCV RBC AUTO: 85.6 FL — SIGNIFICANT CHANGE UP (ref 80–94)
NRBC # BLD: 0 /100 WBCS — SIGNIFICANT CHANGE UP (ref 0–0)
PLATELET # BLD AUTO: 245 K/UL — SIGNIFICANT CHANGE UP (ref 130–400)
POTASSIUM SERPL-MCNC: 4.7 MMOL/L — SIGNIFICANT CHANGE UP (ref 3.5–5)
POTASSIUM SERPL-SCNC: 4.7 MMOL/L — SIGNIFICANT CHANGE UP (ref 3.5–5)
PROT SERPL-MCNC: 6.2 G/DL — SIGNIFICANT CHANGE UP (ref 6–8)
PROTHROM AB SERPL-ACNC: 12.3 SEC — SIGNIFICANT CHANGE UP (ref 9.95–12.87)
RBC # BLD: 4.8 M/UL — SIGNIFICANT CHANGE UP (ref 4.7–6.1)
RBC # FLD: 13.5 % — SIGNIFICANT CHANGE UP (ref 11.5–14.5)
SODIUM SERPL-SCNC: 144 MMOL/L — SIGNIFICANT CHANGE UP (ref 135–146)
WBC # BLD: 7.23 K/UL — SIGNIFICANT CHANGE UP (ref 4.8–10.8)
WBC # FLD AUTO: 7.23 K/UL — SIGNIFICANT CHANGE UP (ref 4.8–10.8)

## 2020-09-13 PROCEDURE — 99233 SBSQ HOSP IP/OBS HIGH 50: CPT

## 2020-09-13 PROCEDURE — 74181 MRI ABDOMEN W/O CONTRAST: CPT | Mod: 26

## 2020-09-13 RX ADMIN — PANTOPRAZOLE SODIUM 40 MILLIGRAM(S): 20 TABLET, DELAYED RELEASE ORAL at 05:13

## 2020-09-13 RX ADMIN — ENOXAPARIN SODIUM 40 MILLIGRAM(S): 100 INJECTION SUBCUTANEOUS at 17:46

## 2020-09-13 RX ADMIN — Medication 25 MILLIGRAM(S): at 09:23

## 2020-09-13 RX ADMIN — Medication 10 MILLIGRAM(S): at 12:38

## 2020-09-13 RX ADMIN — TAMSULOSIN HYDROCHLORIDE 0.4 MILLIGRAM(S): 0.4 CAPSULE ORAL at 21:13

## 2020-09-13 RX ADMIN — TICAGRELOR 90 MILLIGRAM(S): 90 TABLET ORAL at 17:46

## 2020-09-13 RX ADMIN — Medication 25 MILLIGRAM(S): at 17:46

## 2020-09-13 RX ADMIN — Medication 81 MILLIGRAM(S): at 12:38

## 2020-09-13 RX ADMIN — TICAGRELOR 90 MILLIGRAM(S): 90 TABLET ORAL at 05:12

## 2020-09-13 RX ADMIN — LOSARTAN POTASSIUM 50 MILLIGRAM(S): 100 TABLET, FILM COATED ORAL at 05:12

## 2020-09-13 NOTE — PROGRESS NOTE ADULT - SUBJECTIVE AND OBJECTIVE BOX
CHUNKATH  60y, Male  Allergy: No Known Allergies    Hospital Day: 1d    Patient seen and examined earlier today. Plan for MRCP today to evaluate for distal CBD obstruction/ dilation. MICHELL.     PMH/PSH:  PAST MEDICAL & SURGICAL HISTORY:  Heart attack    Ulcer of esophagus    Chronic gastric ulcer without hemorrhage and without perforation    High cholesterol    GERD (gastroesophageal reflux disease)    Cardiac abnormality    Chronic obstructive pulmonary disease    H/O right coronary artery stent placement  August 17, 2020    Cardiac arrhythmia  bypass surgery 9 years ago    Coronary artery disease involving other coronary artery bypass graft, angina presence unspecified        LAST 24-Hr EVENTS:    VITALS:  T(F): 96.3 (09-13-20 @ 05:00), Max: 97.1 (09-12-20 @ 21:47)  HR: 60 (09-13-20 @ 09:21)  BP: 132/75 (09-13-20 @ 09:21) (116/58 - 132/75)  RR: 18 (09-13-20 @ 05:00)  SpO2: 98% (09-12-20 @ 17:34)        TESTS & MEASUREMENTS:  Weight (Kg): 90.7 (09-11-20 @ 20:10)  BMI (kg/m2): 31.3 (09-11)                          12.5   7.23  )-----------( 245      ( 13 Sep 2020 06:28 )             41.1     PT/INR - ( 13 Sep 2020 06:28 )   PT: 12.30 sec;   INR: 1.07 ratio         PTT - ( 13 Sep 2020 06:28 )  PTT:36.5 sec  09-13    144  |  106  |  13  ----------------------------<  131<H>  4.7   |  27  |  1.2    Ca    9.2      13 Sep 2020 06:28  Mg     2.3     09-12    TPro  6.2  /  Alb  3.8  /  TBili  1.2  /  DBili  x   /  AST  458<H>  /  ALT  >700<H>  /  AlkPhos  284<H>  09-13    LIVER FUNCTIONS - ( 13 Sep 2020 06:28 )  Alb: 3.8 g/dL / Pro: 6.2 g/dL / ALK PHOS: 284 U/L / ALT: >700 U/L / AST: 458 U/L / GGT: x           CARDIAC MARKERS ( 12 Sep 2020 15:28 )  x     / 0.06 ng/mL / x     / x     / x      CARDIAC MARKERS ( 12 Sep 2020 10:08 )  x     / 0.07 ng/mL / 48 U/L / x     / x      CARDIAC MARKERS ( 11 Sep 2020 21:03 )  x     / 0.09 ng/mL / x     / x     / x                          RADIOLOGY, ECG, & ADDITIONAL TESTS:  12 Lead ECG:   Ventricular Rate 63 BPM    Atrial Rate 63 BPM    P-R Interval 130 ms    QRS Duration 104 ms    Q-T Interval 472 ms    QTC Calculation(Bazett) 483 ms    P Axis 27 degrees    R Axis -60 degrees    T Axis 204 degrees    Diagnosis Line Normal sinus rhythm  Left anterior fascicular block  Possible Lateral infarct , age undetermined  Inferior infarct , age undetermined  Abnormal ECG    Confirmed by SHELIA ALBERTS MD ([id number]) on 9/13/2020 9:09:14 AM (09-12-20 @ 14:00)      RECENT DIAGNOSTIC ORDERS:  MR Abdomen No Cont: Routine   Indication: MRCP  Transport: Stretcher-Crib  Exam in Progress (09-12-20 @ 17:18)      MEDICATIONS:  MEDICATIONS  (STANDING):  aspirin  chewable 81 milliGRAM(s) Oral daily  enoxaparin Injectable 40 milliGRAM(s) SubCutaneous every 24 hours  losartan 50 milliGRAM(s) Oral daily  metoprolol tartrate 25 milliGRAM(s) Oral every 12 hours  pantoprazole    Tablet 40 milliGRAM(s) Oral before breakfast  PARoxetine 10 milliGRAM(s) Oral daily  tamsulosin 0.4 milliGRAM(s) Oral at bedtime  ticagrelor 90 milliGRAM(s) Oral every 12 hours    MEDICATIONS  (PRN):      HOME MEDICATIONS:  Janumet 50 mg-500 mg oral tablet (08-16)  Paxil 10 mg oral tablet (08-16)      PHYSICAL EXAM:  GENERAL: A&O x3,  in NAD  HNENT: EOMI, PERRLA    NECK: No LAD/swelling  CHEST/LUNG:  CTAB no wheezes/rales/ronchi  HEART: RRR, No murmurs  ABDOMEN: Soft, NT, ND,  Bowel sounds present  EXTREMITIES:  Warm well perfused, no edema

## 2020-09-13 NOTE — PROGRESS NOTE ADULT - ASSESSMENT
60yMale [facility] three and a half weeks ago, GERD presents RUQ and epigastric pain that was a 7/10 non radiating. Patient reports pain was 7/10 not worse with food and not radiating. Patient reports pain resolved with morphine and he states he does not need morphine anymore and his pain is now 0/10.    Problem 1-Transaminitis Cholelithiasis, Hepatic Steatosis, initially with RUQ and epigastric pain   DDX distal CBD stone, DILI, Ischemic hepatitis   MRCP with Punctate hypointensities within CBD on T2-weighted images, likely artifactual. No definite choledocholithiasis.  Rec  -Cardiac consultation for risk stratification if EUS +/- ERCP is needed  -To discuss with advanced team tomorrow and go over benefits and risk for possible EUS +/- ERCP  -MRCP positive for Punctate hypointensities within CBD on T2-weighted images, likely artifactual. No definite choledocholithiasis.  -RUQ ultrasound appreciated CBD 5mm not dilated    -Dietary and lifestyle modifications advised   -Check Hepatitis B Sag, Hep B SAB, Hep A Ab, Hep C Ab,Smooth Muscle Antibody,  AMA, ERWIN, Ceruloplasmin, Ferritin, Alpha-1-antitrypsin, CMV, Herpes serologies, EBV serologies,   -Avoid hepatotoxic medications  -Trend LFTs and INR daily, LFTs modestly worse today  -Monitor CBC  -Notify GI STAT if any signs of hemodynamic instability     Problem 2- Pancreatic body 0.9 cm cyst, likely side branch IPMN  Rec  -Follow-up with patient's private GI Dr. Giordano  for monitoring

## 2020-09-13 NOTE — PROGRESS NOTE ADULT - ASSESSMENT
Pt is a 59yo male with pmh of esophageal ulcer, GERD, HLD, COPD, CAD s/p CABG,  MI with RCA stent 1m ago, who presents for epigastric/ chest pain.     # Atypical Chest Pain   # Epigastric Pain   #Transaminitis   - troponin downtrending from prior   - EKG reviewed by Cardiologist , no acute findings  - pt with transaminitis, elevated GGT and elevated Bili c/f GI choledocholithiasis   - RUQ with cholelithiasis and hepatic steatosis   - GI consulted: MRCP pending   - monitor inpt for transaminitis to downtrend     #CAD s/p CABG   #Recent MI w/ RCA stent   - continue ASA, TIcagrelor  - holding statin due to transaminitis   - continue losartan and metoprolol     #HLD   - holding statin     #GERD   -pantoprazole     #BPH  - tamsulosin     #Mood Disorder   - continue Paroxetine     DVT ppx: enoxaparin       #Progress Note Handoff:  Pending (specify):  Consults_________, Tests________, Test Results_______, Other_____MRCP____  Family discussion: discussed with pt   Disposition: Home___/SNF___/Other________/Unknown at this time___x_____      Marianne Aiken DO

## 2020-09-13 NOTE — PROGRESS NOTE ADULT - SUBJECTIVE AND OBJECTIVE BOX
60yMale  Being followed for altered liver chemistries   Interval history: Patient denies nausea, vomiting, hematemesis, melena, blood in stool, diarrhea, constipation, abdominal pain. Patient reports his pain has completely resolved.      PAST MEDICAL & SURGICAL HISTORY:   Heart attack    Ulcer of esophagus    Chronic gastric ulcer without hemorrhage and without perforation    High cholesterol    GERD (gastroesophageal reflux disease)    Cardiac abnormality    Chronic obstructive pulmonary disease    H/O right coronary artery stent placement  August 17, 2020    Cardiac arrhythmia  bypass surgery 9 years ago    Coronary artery disease involving other coronary artery bypass graft, angina presence unspecified            Social History: No smoking. No alcohol. No illegal drug use.            MEDICATIONS  (STANDING):  aspirin  chewable 81 milliGRAM(s) Oral daily  enoxaparin Injectable 40 milliGRAM(s) SubCutaneous every 24 hours  losartan 50 milliGRAM(s) Oral daily  metoprolol tartrate 25 milliGRAM(s) Oral every 12 hours  pantoprazole    Tablet 40 milliGRAM(s) Oral before breakfast  PARoxetine 10 milliGRAM(s) Oral daily  tamsulosin 0.4 milliGRAM(s) Oral at bedtime  ticagrelor 90 milliGRAM(s) Oral every 12 hours        Allergies:   No Known Allergies              REVIEW OF SYSTEMS:  General:  No weight loss, fevers, or chills.  Eyes:  No reported pain or visual changes  ENT:  No sore throat or runny nose.  NECK: No stiffness   CV:  No chest pain or palpitations.  Resp:  No shortness of breath, cough  GI:  No abdominal pain, nausea, vomiting, dysphagia, diarrhea or constipation. No rectal bleeding, melena, or hematemesis.  Muscle:  No aches or weakness  Neuro:  No tingling, numbness           VITAL SIGNS:   T(F): 96.9 (09-13-20 @ 14:31), Max: 97.1 (09-12-20 @ 21:47)  HR: 66 (09-13-20 @ 14:31) (57 - 70)  BP: 104/54 (09-13-20 @ 14:31) (104/54 - 132/75)  RR: 18 (09-13-20 @ 05:00) (18 - 18)  SpO2: 98% (09-12-20 @ 17:34) (98% - 98%)    PHYSICAL EXAM:  GENERAL: AAOx3, no acute distress.  HEAD:  Atraumatic, Normocephalic  EYES: conjunctiva and sclera clear  NECK: Supple, no JVD or thyromegaly  CHEST/LUNG: Clear to auscultation bilaterally; No wheeze, rhonchi, or rales  HEART: Regular rate and rhythm; normal S1, S2, No murmurs.  ABDOMEN: Soft, nontender, nondistended; Bowel sounds present, no abdominal bruit, masses, or hepatosplenomegaly  NEUROLOGY: No asterixis or tremor.   SKIN: Intact, no jaundice            LABS:                        12.5   7.23  )-----------( 245      ( 13 Sep 2020 06:28 )             41.1     09-13    144  |  106  |  13  ----------------------------<  131<H>  4.7   |  27  |  1.2    Ca    9.2      13 Sep 2020 06:28  Mg     2.3     09-12    TPro  6.2  /  Alb  3.8  /  TBili  1.2  /  DBili  x   /  AST  458<H>  /  ALT  >700<H>  /  AlkPhos  284<H>  09-13    LIVER FUNCTIONS - ( 13 Sep 2020 06:28 )  Alb: 3.8 g/dL / Pro: 6.2 g/dL / ALK PHOS: 284 U/L / ALT: >700 U/L / AST: 458 U/L / GGT: x           PT/INR - ( 13 Sep 2020 06:28 )   PT: 12.30 sec;   INR: 1.07 ratio         PTT - ( 13 Sep 2020 06:28 )  PTT:36.5 sec    IMAGING:    < from: MR Abdomen No Cont (09.13.20 @ 12:03) >  EXAM:  MR ABDOMEN            PROCEDURE DATE:  09/13/2020            INTERPRETATION:  CLINICAL STATEMENT:  Abdominal pain. Cholelithiasis..    TECHNIQUE: Axial in- and out-of-phase T1-weighted; axial fat-saturated T2-weighted; axial and coronal single-shot fast spin-echo T2-weighted; 3D high-resolution, heavily T2-weighted images were acquired.    Correlation is made with CT abdomen and pelvis September 11, 2020 and right upper quadrant sonogram September 12, 2020.    FINDINGS:    HEPATOBILIARY: Mild hepatic steatosis. Distended gallbladder with cholelithiasis. No pericholecystic inflammation to suggest cholecystitis. 0.9 cm cyst right hepatic lobe. Motion degraded MRCP images. Punctate hypointensities within CBD on T2-weighted images (series 1300 images 24 and 26), likely artifact. No ductal dilatation..    SPLEEN:  Unremarkable.    PANCREAS:  Pancreatic body 0.9 cm cyst, likely side branch IPMN. No pancreatic ductal dilatation.    ADRENAL GLANDS:  Unchanged left adrenal 1.1 cm indeterminate nodule.    KIDNEYS:  No hydronephrosis bilaterally. Subcentimeter right renal cyst.    ABDOMINAL NODES:  No adenopathy.    BONES/SOFT TISSUES:  Unremarkable.    OTHER:  None.    IMPRESSION:    Cholelithiasis without evidence for cholecystitis.    No biliary ductal dilatation.    Motion degraded MRCP images. Punctate hypointensities within CBD on T2-weighted images, likely artifactual. No definite choledocholithiasis.    Pancreatic body 0.9 cm cyst, likely side branch IPMN.                ELLIOT LANDAU M.D., ATTENDING RADIOLOGIST  This document has been electronically signed. Sep 13 2020  1:04PM    < end of copied text >

## 2020-09-14 ENCOUNTER — TRANSCRIPTION ENCOUNTER (OUTPATIENT)
Age: 60
End: 2020-09-14

## 2020-09-14 VITALS
RESPIRATION RATE: 16 BRPM | SYSTOLIC BLOOD PRESSURE: 122 MMHG | TEMPERATURE: 97 F | HEART RATE: 73 BPM | DIASTOLIC BLOOD PRESSURE: 74 MMHG

## 2020-09-14 PROBLEM — K22.10 ULCER OF ESOPHAGUS WITHOUT BLEEDING: Chronic | Status: ACTIVE | Noted: 2020-08-16

## 2020-09-14 LAB
ALBUMIN SERPL ELPH-MCNC: 3.6 G/DL — SIGNIFICANT CHANGE UP (ref 3.5–5.2)
ALP SERPL-CCNC: 226 U/L — HIGH (ref 30–115)
ALT FLD-CCNC: 487 U/L — HIGH (ref 0–41)
ANION GAP SERPL CALC-SCNC: 11 MMOL/L — SIGNIFICANT CHANGE UP (ref 7–14)
APTT BLD: 37.1 SEC — SIGNIFICANT CHANGE UP (ref 27–39.2)
AST SERPL-CCNC: 154 U/L — HIGH (ref 0–41)
BILIRUB SERPL-MCNC: 0.7 MG/DL — SIGNIFICANT CHANGE UP (ref 0.2–1.2)
BUN SERPL-MCNC: 14 MG/DL — SIGNIFICANT CHANGE UP (ref 10–20)
CALCIUM SERPL-MCNC: 8.8 MG/DL — SIGNIFICANT CHANGE UP (ref 8.5–10.1)
CHLORIDE SERPL-SCNC: 107 MMOL/L — SIGNIFICANT CHANGE UP (ref 98–110)
CO2 SERPL-SCNC: 25 MMOL/L — SIGNIFICANT CHANGE UP (ref 17–32)
CREAT SERPL-MCNC: 1.1 MG/DL — SIGNIFICANT CHANGE UP (ref 0.7–1.5)
GLUCOSE BLDC GLUCOMTR-MCNC: 106 MG/DL — HIGH (ref 70–99)
GLUCOSE SERPL-MCNC: 112 MG/DL — HIGH (ref 70–99)
HCT VFR BLD CALC: 38.2 % — LOW (ref 42–52)
HGB BLD-MCNC: 11.9 G/DL — LOW (ref 14–18)
INR BLD: 1.06 RATIO — SIGNIFICANT CHANGE UP (ref 0.65–1.3)
MCHC RBC-ENTMCNC: 26.6 PG — LOW (ref 27–31)
MCHC RBC-ENTMCNC: 31.2 G/DL — LOW (ref 32–37)
MCV RBC AUTO: 85.5 FL — SIGNIFICANT CHANGE UP (ref 80–94)
NRBC # BLD: 0 /100 WBCS — SIGNIFICANT CHANGE UP (ref 0–0)
PLATELET # BLD AUTO: 221 K/UL — SIGNIFICANT CHANGE UP (ref 130–400)
POTASSIUM SERPL-MCNC: 4.4 MMOL/L — SIGNIFICANT CHANGE UP (ref 3.5–5)
POTASSIUM SERPL-SCNC: 4.4 MMOL/L — SIGNIFICANT CHANGE UP (ref 3.5–5)
PROT SERPL-MCNC: 5.9 G/DL — LOW (ref 6–8)
PROTHROM AB SERPL-ACNC: 12.2 SEC — SIGNIFICANT CHANGE UP (ref 9.95–12.87)
RBC # BLD: 4.47 M/UL — LOW (ref 4.7–6.1)
RBC # FLD: 13.7 % — SIGNIFICANT CHANGE UP (ref 11.5–14.5)
SODIUM SERPL-SCNC: 143 MMOL/L — SIGNIFICANT CHANGE UP (ref 135–146)
WBC # BLD: 9.13 K/UL — SIGNIFICANT CHANGE UP (ref 4.8–10.8)
WBC # FLD AUTO: 9.13 K/UL — SIGNIFICANT CHANGE UP (ref 4.8–10.8)

## 2020-09-14 PROCEDURE — 99233 SBSQ HOSP IP/OBS HIGH 50: CPT

## 2020-09-14 PROCEDURE — 99232 SBSQ HOSP IP/OBS MODERATE 35: CPT

## 2020-09-14 RX ORDER — TAMSULOSIN HYDROCHLORIDE 0.4 MG/1
1 CAPSULE ORAL
Qty: 0 | Refills: 0 | DISCHARGE
Start: 2020-09-14

## 2020-09-14 RX ADMIN — LOSARTAN POTASSIUM 50 MILLIGRAM(S): 100 TABLET, FILM COATED ORAL at 05:24

## 2020-09-14 RX ADMIN — Medication 25 MILLIGRAM(S): at 05:25

## 2020-09-14 RX ADMIN — ENOXAPARIN SODIUM 40 MILLIGRAM(S): 100 INJECTION SUBCUTANEOUS at 12:44

## 2020-09-14 RX ADMIN — Medication 81 MILLIGRAM(S): at 11:54

## 2020-09-14 RX ADMIN — TICAGRELOR 90 MILLIGRAM(S): 90 TABLET ORAL at 05:24

## 2020-09-14 RX ADMIN — Medication 10 MILLIGRAM(S): at 11:54

## 2020-09-14 RX ADMIN — PANTOPRAZOLE SODIUM 40 MILLIGRAM(S): 20 TABLET, DELAYED RELEASE ORAL at 06:08

## 2020-09-14 NOTE — DISCHARGE NOTE PROVIDER - NSDCFUADDAPPT_GEN_ALL_CORE_FT
Patient must follow up with [facility] at their clinic this week.  Patient should follow up with his PCP, Dr. Warner, within 1 week.  Patient should also follow up with his cardiologist within 2 weeks. Patient must follow up with GI, Dr. Llanes, this week on wednesday.   Patient should follow up with his PCP, Dr. Warner, within 1 week.  Patient should also follow up with his cardiologist within 2 weeks.

## 2020-09-14 NOTE — DISCHARGE NOTE PROVIDER - PROVIDER TOKENS
PROVIDER:[TOKEN:[[id number]],FOLLOWUP:[1 week]] PROVIDER:[TOKEN:[[id number]],FOLLOWUP:[1 week]],PROVIDER:[TOKEN:[[id number]],FOLLOWUP:[1-3 days]]

## 2020-09-14 NOTE — DISCHARGE NOTE PROVIDER - HOSPITAL COURSE
Pt is a 60M w/ PMH esophageal ulcer, GERD, COPD, CAD (recent NSTEMI w/ new stent) admitted for chest pain r/o ACS and transaminitis. Cardiac enzymes were monitored and mildly elevated on admission, but downtrended. Cardiology was consulted and cleared patient from their standpoint. GI was also consulted on this case 2/2 transaminitis and elevated bilirubin. RUQ was negative for cholecystitis. GI recommended MRCP which was completed and showed punctate hypo intensities within the CBD and no definite choledocholithiasis. GI recommended consulting advanced GI 2/2 possible need for ERCP, but patient was recently started on Brilinta 2/2 new cardiac stent - which cannot be stopped. So, advanced GI recommended outpatient follow up. Pt is a 60M w/ PMH esophageal ulcer, GERD, COPD, CAD (recent NSTEMI w/ new stent) admitted for chest pain r/o ACS and transaminitis. Cardiac enzymes were monitored and mildly elevated on admission, but downtrended. GI was consulted on this case 2/2 transaminitis and elevated bilirubin. RUQ was negative for cholecystitis. GI recommended MRCP which was completed and showed punctate hypo intensities within the CBD and no definite choledocholithiasis. GI recommended consulting advanced GI 2/2 possible need for ERCP, but patient was recently started on Brilinta 2/2 new cardiac stent - which cannot be stopped per Cardiology. So, advanced GI recommended outpatient follow up. Patient's LFT's downtrended. Will discharge with GI follow up this week.     GENERAL: A&O x3,  in NAD  HNENT: EOMI, PERRLA    NECK: No LAD/swelling  CHEST/LUNG:  CTAB no wheezes/rales/ronchi  HEART: RRR, No murmurs  ABDOMEN: Soft, NT, ND,  Bowel sounds present  EXTREMITIES:  Warm well perfused, no edema       Plan:   Discharge home with GI follow up with Dr. Llanes on 9/16 or 9/18

## 2020-09-14 NOTE — DISCHARGE NOTE PROVIDER - NSDCCPCAREPLAN_GEN_ALL_CORE_FT
PRINCIPAL DISCHARGE DIAGNOSIS  Diagnosis: Chest pain  Assessment and Plan of Treatment: Non-cardiac  Patient seen and cleared by cardiology  Patient should follow up with his PCP, Dr. Warner within 1 week.   Patient should also follow up with his cardiologist in 2 weeks.      SECONDARY DISCHARGE DIAGNOSES  Diagnosis: Transaminitis  Assessment and Plan of Treatment: Concern for choledocolithiasis. ERCP could not be performed during this admission 2/2 patient newly started on Brilinta.  Patient must follow up with advanced GI at their clinic later this week.     PRINCIPAL DISCHARGE DIAGNOSIS  Diagnosis: Chest pain  Assessment and Plan of Treatment: Non-cardiac  Patient seen and cleared by cardiology  Patient should follow up with his PCP, Dr. Warner within 1 week.   Patient should also follow up with his cardiologist in 2 weeks.      SECONDARY DISCHARGE DIAGNOSES  Diagnosis: Transaminitis  Assessment and Plan of Treatment: Concern for choledocolithiasis. ERCP could not be performed during this admission 2/2 patient newly started on Brilinta.  Patient must follow up with GI, Dr. Llanes, on wednesday.     PRINCIPAL DISCHARGE DIAGNOSIS  Diagnosis: Chest pain  Assessment and Plan of Treatment: Non-cardiac  Patient seen and cleared by cardiology  Patient should follow up with his PCP, Dr. Warner within 1 week.   Patient should also follow up with his cardiologist in 2 weeks.      SECONDARY DISCHARGE DIAGNOSES  Diagnosis: Transaminitis  Assessment and Plan of Treatment: Concern for choledocolithiasis. ERCP could not be performed during this admission 2/2 patient newly started on Brilinta.  Patient must follow up with GI, Dr. Llanes, this week

## 2020-09-14 NOTE — PROGRESS NOTE ADULT - ASSESSMENT
60yMale [facility] three and a half weeks ago, GERD presents RUQ and epigastric pain that was a 7/10 non radiating. Patient reports pain was 7/10 not worse with food and not radiating. Patient reports pain resolved with morphine and he states he does not need morphine anymore and his pain is now 0/10.    Problem 1-Transaminitis Cholelithiasis, Hepatic Steatosis, initially with RUQ and epigastric pain   DDX distal CBD stone, DILI, Ischemic hepatitis   MRCP with Punctate hypointensities within CBD on T2-weighted images, likely artifactual. No definite choledocholithiasis.  Rec  -Cardiac consultation for risk stratification if EUS +/- ERCP is needed, appreciated patient intermediate risk  -Dr. Llanes taking over plan of care appointment planned for Wednesday  -MRCP positive for Punctate hypointensities within CBD on T2-weighted images, likely artifactual. No definite choledocholithiasis.  -RUQ ultrasound appreciated CBD 5mm not dilated    -Dietary and lifestyle modifications advised   -Check Hepatitis B Sag, Hep B SAB, Hep A Ab, Hep C Ab,Smooth Muscle Antibody,  AMA, ERWIN, Ceruloplasmin, Ferritin, Alpha-1-antitrypsin, CMV, Herpes serologies, EBV serologies,   -Avoid hepatotoxic medications  -Trend LFTs and INR daily, LFTs improved today  -Monitor CBC  -Notify GI STAT if any signs of hemodynamic instability     Problem 2- Pancreatic body 0.9 cm cyst, likely side branch IPMN  Rec  -Follow-up with patient's private GI Dr. Giordano  for monitoring     Recall GI if needed

## 2020-09-14 NOTE — DISCHARGE NOTE NURSING/CASE MANAGEMENT/SOCIAL WORK - NSDCPECAREEDUMAT _GEN_ALL_CORE
Lindsay from [facility] left  stating pt has an upcoming surgery scheduled with Dr. Glover on 7/16/18 stating she could not see where Dr. Rodas's H&P was in the chart. Requested a call back.    Writer called her back and left  stating pt's pre-op was done on 7/06/18 and noted it's one of the progress notes from that day. Informed her note is started by writer and Dr. Rodas then completes it. Noted if she had any questions in regards to this message to call our office back.  
MI

## 2020-09-14 NOTE — DISCHARGE NOTE PROVIDER - CARE PROVIDERS DIRECT ADDRESSES
,[email or web address],DirectAddress_Unknown

## 2020-09-14 NOTE — DISCHARGE NOTE PROVIDER - CARE PROVIDER_API CALL
Conner Warner   INTERNAL MEDICINE  [address] [place] [address]  Phone: [phone]  Fax: [phone]  Follow Up Time: 1 week   Conner Warner  INTERNAL MEDICINE  [address] [place] [address]  Phone: [phone]  Fax: [phone]  Follow Up Time: 1 week    Yazmin Llanes  GASTROENTEROLOGY  [address] [place] [address]  Phone: [phone]  Fax: [phone]  Follow Up Time: 1-3 days

## 2020-09-14 NOTE — PROGRESS NOTE ADULT - SUBJECTIVE AND OBJECTIVE BOX
60yMale  Being followed for altered liver chemistries   Interval history: Patient denies nausea, vomiting, hematemesis, melena, blood in stool, diarrhea, constipation, abdominal pain       PAST MEDICAL & SURGICAL HISTORY:   Heart attack    Ulcer of esophagus    Chronic gastric ulcer without hemorrhage and without perforation    High cholesterol    GERD (gastroesophageal reflux disease)    Cardiac abnormality    Chronic obstructive pulmonary disease    H/O right coronary artery stent placement  August 17, 2020    Cardiac arrhythmia  bypass surgery 9 years ago    Coronary artery disease involving other coronary artery bypass graft, angina presence unspecified            Social History: No smoking. No alcohol. No illegal drug use.            MEDICATIONS  (STANDING):  aspirin  chewable 81 milliGRAM(s) Oral daily  enoxaparin Injectable 40 milliGRAM(s) SubCutaneous every 24 hours  losartan 50 milliGRAM(s) Oral daily  metoprolol tartrate 25 milliGRAM(s) Oral every 12 hours  pantoprazole    Tablet 40 milliGRAM(s) Oral before breakfast  PARoxetine 10 milliGRAM(s) Oral daily  tamsulosin 0.4 milliGRAM(s) Oral at bedtime  ticagrelor 90 milliGRAM(s) Oral every 12 hours        Allergies:     No Known Allergies            REVIEW OF SYSTEMS:  General:  No weight loss, fevers, or chills.  Eyes:  No reported pain or visual changes  ENT:  No sore throat or runny nose.  NECK: No stiffness   CV:  No chest pain or palpitations.  Resp:  No shortness of breath, cough  GI:  No abdominal pain, nausea, vomiting, dysphagia, diarrhea or constipation. No rectal bleeding, melena, or hematemesis.  Muscle:  No aches or weakness  Neuro:  No tingling, numbness           VITAL SIGNS:   T(F): 97.1 (09-14-20 @ 13:15), Max: 98.1 (09-13-20 @ 21:00)  HR: 73 (09-14-20 @ 13:15) (60 - 73)  BP: 122/74 (09-14-20 @ 13:15) (106/56 - 122/74)  RR: 16 (09-14-20 @ 13:15) (16 - 18)  SpO2: --    PHYSICAL EXAM:  GENERAL: AAOx3, no acute distress.  HEAD:  Atraumatic, Normocephalic  EYES: conjunctiva and sclera clear  NECK: Supple, no JVD or thyromegaly  CHEST/LUNG: Clear to auscultation bilaterally; No wheeze, rhonchi, or rales  HEART: Regular rate and rhythm; normal S1, S2, No murmurs.  ABDOMEN: Soft, nontender, nondistended; Bowel sounds present, no abdominal bruit, masses, or hepatosplenomegaly  NEUROLOGY: No asterixis or tremor.   SKIN: Intact, no jaundice            LABS:                        11.9   9.13  )-----------( 221      ( 14 Sep 2020 06:25 )             38.2     09-14    143  |  107  |  14  ----------------------------<  112<H>  4.4   |  25  |  1.1    Ca    8.8      14 Sep 2020 06:25    TPro  5.9<L>  /  Alb  3.6  /  TBili  0.7  /  DBili  x   /  AST  154<H>  /  ALT  487<H>  /  AlkPhos  226<H>  09-14    LIVER FUNCTIONS - ( 14 Sep 2020 06:25 )  Alb: 3.6 g/dL / Pro: 5.9 g/dL / ALK PHOS: 226 U/L / ALT: 487 U/L / AST: 154 U/L / GGT: x           PT/INR - ( 14 Sep 2020 06:25 )   PT: 12.20 sec;   INR: 1.06 ratio         PTT - ( 14 Sep 2020 06:25 )  PTT:37.1 sec    IMAGING:      < from: MR Abdomen No Cont (09.13.20 @ 12:03) >  EXAM:  MR ABDOMEN            PROCEDURE DATE:  09/13/2020            INTERPRETATION:  CLINICAL STATEMENT:  Abdominal pain. Cholelithiasis..    TECHNIQUE: Axial in- and out-of-phase T1-weighted; axial fat-saturated T2-weighted; axial and coronal single-shot fast spin-echo T2-weighted; 3D high-resolution, heavily T2-weighted images were acquired.    Correlation is made with CT abdomen and pelvis September 11, 2020 and right upper quadrant sonogram September 12, 2020.    FINDINGS:    HEPATOBILIARY: Mild hepatic steatosis. Distended gallbladder with cholelithiasis. No pericholecystic inflammation to suggest cholecystitis. 0.9 cm cyst right hepatic lobe. Motion degraded MRCP images. Punctate hypointensities within CBD on T2-weighted images (series 1300 images 24 and 26), likely artifact. No ductal dilatation..    SPLEEN:  Unremarkable.    PANCREAS:  Pancreatic body 0.9 cm cyst, likely side branch IPMN. No pancreatic ductal dilatation.    ADRENAL GLANDS:  Unchanged left adrenal 1.1 cm indeterminate nodule.    KIDNEYS:  No hydronephrosis bilaterally. Subcentimeter right renal cyst.    ABDOMINAL NODES:  No adenopathy.    BONES/SOFT TISSUES:  Unremarkable.    OTHER:  None.    IMPRESSION:    Cholelithiasis without evidence for cholecystitis.    No biliary ductal dilatation.    Motion degraded MRCP images. Punctate hypointensities within CBD on T2-weighted images, likely artifactual. No definite choledocholithiasis.    Pancreatic body 0.9 cm cyst, likely side branch IPMN.                ELLIOT LANDAU M.D., ATTENDING RADIOLOGIST  This document has been electronically signed. Sep 13 2020  1:04PM    < end of copied text >       60yMale  Being followed for altered liver chemistries   Interval history: Patient denies nausea, vomiting, hematemesis, melena, blood in stool, diarrhea, constipation, abdominal pain       PAST MEDICAL & SURGICAL HISTORY:   Heart attack    Ulcer of esophagus    Chronic gastric ulcer without hemorrhage and without perforation    High cholesterol    GERD (gastroesophageal reflux disease)    Cardiac abnormality    Chronic obstructive pulmonary disease    H/O right coronary artery stent placement  August 17, 2020    Cardiac arrhythmia  bypass surgery 9 years ago    Coronary artery disease involving other coronary artery bypass graft, angina presence unspecified            Social History: No smoking. No alcohol. No illegal drug use.            MEDICATIONS  (STANDING):  aspirin  chewable 81 milliGRAM(s) Oral daily  enoxaparin Injectable 40 milliGRAM(s) SubCutaneous every 24 hours  losartan 50 milliGRAM(s) Oral daily  metoprolol tartrate 25 milliGRAM(s) Oral every 12 hours  pantoprazole    Tablet 40 milliGRAM(s) Oral before breakfast  PARoxetine 10 milliGRAM(s) Oral daily  tamsulosin 0.4 milliGRAM(s) Oral at bedtime  ticagrelor 90 milliGRAM(s) Oral every 12 hours        Allergies:     No Known Allergies            REVIEW OF SYSTEMS:  General:  No weight loss, fevers, or chills.  Eyes:  No reported pain or visual changes  ENT:  No sore throat or runny nose.  NECK: No stiffness   CV:  No chest pain or palpitations.  Resp:  No shortness of breath, cough  GI:  No abdominal pain, nausea, vomiting, dysphagia, diarrhea or constipation. No rectal bleeding, melena, or hematemesis.  Neuro:  No tingling, numbness           VITAL SIGNS:   T(F): 97.1 (09-14-20 @ 13:15), Max: 98.1 (09-13-20 @ 21:00)  HR: 73 (09-14-20 @ 13:15) (60 - 73)  BP: 122/74 (09-14-20 @ 13:15) (106/56 - 122/74)  RR: 16 (09-14-20 @ 13:15) (16 - 18)  SpO2: --    PHYSICAL EXAM:  GENERAL: AAOx3, no acute distress.  HEAD:  Atraumatic, Normocephalic  EYES: conjunctiva and sclera clear  NECK: Supple, no JVD or thyromegaly  CHEST/LUNG: Clear to auscultation bilaterally; No wheeze, rhonchi, or rales  HEART: Regular rate and rhythm; normal S1, S2, No murmurs.  ABDOMEN: Soft, nontender, nondistended; Bowel sounds present, no abdominal bruit, masses, or hepatosplenomegaly  NEUROLOGY: No asterixis or tremor.   SKIN: Intact, no jaundice            LABS:                        11.9   9.13  )-----------( 221      ( 14 Sep 2020 06:25 )             38.2     09-14    143  |  107  |  14  ----------------------------<  112<H>  4.4   |  25  |  1.1    Ca    8.8      14 Sep 2020 06:25    TPro  5.9<L>  /  Alb  3.6  /  TBili  0.7  /  DBili  x   /  AST  154<H>  /  ALT  487<H>  /  AlkPhos  226<H>  09-14    LIVER FUNCTIONS - ( 14 Sep 2020 06:25 )  Alb: 3.6 g/dL / Pro: 5.9 g/dL / ALK PHOS: 226 U/L / ALT: 487 U/L / AST: 154 U/L / GGT: x           PT/INR - ( 14 Sep 2020 06:25 )   PT: 12.20 sec;   INR: 1.06 ratio         PTT - ( 14 Sep 2020 06:25 )  PTT:37.1 sec    IMAGING:      < from: MR Abdomen No Cont (09.13.20 @ 12:03) >  EXAM:  MR ABDOMEN            PROCEDURE DATE:  09/13/2020            INTERPRETATION:  CLINICAL STATEMENT:  Abdominal pain. Cholelithiasis..    TECHNIQUE: Axial in- and out-of-phase T1-weighted; axial fat-saturated T2-weighted; axial and coronal single-shot fast spin-echo T2-weighted; 3D high-resolution, heavily T2-weighted images were acquired.    Correlation is made with CT abdomen and pelvis September 11, 2020 and right upper quadrant sonogram September 12, 2020.    FINDINGS:    HEPATOBILIARY: Mild hepatic steatosis. Distended gallbladder with cholelithiasis. No pericholecystic inflammation to suggest cholecystitis. 0.9 cm cyst right hepatic lobe. Motion degraded MRCP images. Punctate hypointensities within CBD on T2-weighted images (series 1300 images 24 and 26), likely artifact. No ductal dilatation..    SPLEEN:  Unremarkable.    PANCREAS:  Pancreatic body 0.9 cm cyst, likely side branch IPMN. No pancreatic ductal dilatation.    ADRENAL GLANDS:  Unchanged left adrenal 1.1 cm indeterminate nodule.    KIDNEYS:  No hydronephrosis bilaterally. Subcentimeter right renal cyst.    ABDOMINAL NODES:  No adenopathy.    BONES/SOFT TISSUES:  Unremarkable.    OTHER:  None.    IMPRESSION:    Cholelithiasis without evidence for cholecystitis.    No biliary ductal dilatation.    Motion degraded MRCP images. Punctate hypointensities within CBD on T2-weighted images, likely artifactual. No definite choledocholithiasis.    Pancreatic body 0.9 cm cyst, likely side branch IPMN.                ELLIOT LANDAU M.D., ATTENDING RADIOLOGIST  This document has been electronically signed. Sep 13 2020  1:04PM    < end of copied text >

## 2020-09-14 NOTE — DISCHARGE NOTE PROVIDER - NSDCMRMEDTOKEN_GEN_ALL_CORE_FT
aspirin 81 mg oral delayed release tablet: 1 tab(s) orally once a day   atorvastatin 80 mg oral tablet: 1 tab(s) orally once a day (at bedtime)  Janumet 50 mg-500 mg oral tablet: 1 tab(s) orally 2 times a day  losartan 50 mg oral tablet: 1 tab(s) orally once a day  metoprolol tartrate 25 mg oral tablet: 1 tab(s) orally 2 times a day  Paxil 10 mg oral tablet: 1 tab(s) orally once a day  ticagrelor 90 mg oral tablet: 1 tab(s) orally 2 times a day MDD:2

## 2020-09-14 NOTE — DISCHARGE NOTE NURSING/CASE MANAGEMENT/SOCIAL WORK - PATIENT PORTAL LINK FT
MICU downgrade to hospitalist service acceptance note:    Pt seen /examined at bedside and charts reviewed, briefly she is 19 y/o female with PMHx of type-I DM on insulin pump, was presented to ED with c/o not feeling well and high blood glucose level. In ER pt found to be in DKA, started on insulin infusion and admitted to MICU early morning. Now out of DKA, gap closed, insulin drip stopped, s/p Lantus dose in MICU and has been downgraded to hospitalist service.    PHYSICAL EXAM:    Vital Signs Last 24 Hrs  T(C): 36.8 (07 Jul 2018 14:55), Max: 37.3 (07 Jul 2018 07:15)  T(F): 98.3 (07 Jul 2018 14:55), Max: 99.2 (07 Jul 2018 07:15)  HR: 96 (07 Jul 2018 14:55) (79 - 134)  BP: 102/55 (07 Jul 2018 14:55) (86/53 - 121/86)  BP(mean): 73 (07 Jul 2018 13:00) (64 - 77)  RR: 20 (07 Jul 2018 14:55) (10 - 25)  SpO2: 100% (07 Jul 2018 13:00) (98% - 100%)    GENERAL: Pt lying comfortably, NAD.  CHEST/LUNG: Clear to auscultation bilaterally; No wheezing.  HEART: S1S2+, Regular rate and rhythm; No murmurs.  ABDOMEN: Soft, Nontender, Nondistended; Bowel sounds present.  Extremities: No LE edema.  NEURO: AAOX3, no focal deficits, no motor r sensory loss.      Assessment /plan:  Hospital course as above:    >S/p DKA- C/w subc lantus, LSS + FS monitoring. Endocrine consulted.  >Leukocytosis- Likely reactive, no signs of infection, check UA /CXR, monitor CBC  >Hypokalemia- Resolved  >Metabolic encephalopathy- Improving  >Dehydration- S/p fluid resuscitation, improved.  >ALEJANDRO due to dehydration- Improved.  Monitor renal function. MICU downgrade to hospitalist service acceptance note:    Pt seen /examined at bedside and charts reviewed, briefly she is 21 y/o female with PMHx of type-I DM on insulin pump, was presented to ED with c/o not feeling well and high blood glucose level. In ER pt found to be in DKA, started on insulin infusion and admitted to MICU early morning. Now out of DKA, gap closed, insulin drip stopped, s/p Lantus dose in MICU and has been downgraded to hospitalist service.    PHYSICAL EXAM:    Vital Signs Last 24 Hrs  T(C): 36.8 (07 Jul 2018 14:55), Max: 37.3 (07 Jul 2018 07:15)  T(F): 98.3 (07 Jul 2018 14:55), Max: 99.2 (07 Jul 2018 07:15)  HR: 96 (07 Jul 2018 14:55) (79 - 134)  BP: 102/55 (07 Jul 2018 14:55) (86/53 - 121/86)  BP(mean): 73 (07 Jul 2018 13:00) (64 - 77)  RR: 20 (07 Jul 2018 14:55) (10 - 25)  SpO2: 100% (07 Jul 2018 13:00) (98% - 100%)    GENERAL: Pt lying comfortably, NAD.  CHEST/LUNG: Clear to auscultation bilaterally; No wheezing.  HEART: S1S2+, Regular rate and rhythm; No murmurs.  ABDOMEN: Soft, Nontender, Nondistended; Bowel sounds present.  Extremities: No LE edema.  NEURO: Drowsy /sleepy, unable to complete full neuro exam.       Assessment /plan:  Hospital course as above:    >S/p DKA- C/w subc lantus, LSS + FS monitoring. Endocrine consulted.  >Leukocytosis- Likely reactive, no signs of infection, check UA /CXR, monitor CBC  >Hypokalemia- Resolved  >Metabolic encephalopathy- Improving  >Dehydration- S/p fluid resuscitation, improved.  >ALEJANDRO due to dehydration- Improved.  Monitor renal function. MICU downgrade to hospitalist service acceptance note:    Pt seen /examined at bedside and charts reviewed, briefly she is 19 y/o female with PMHx of type-I DM, was presented to ED with c/o not feeling well and high blood glucose level. In ER pt found to be in DKA, started on insulin infusion and admitted to MICU early morning. Now out of DKA, gap closed, insulin drip stopped, s/p Lantus dose in MICU and has been downgraded to hospitalist service.    PHYSICAL EXAM:    Vital Signs Last 24 Hrs  T(C): 36.8 (07 Jul 2018 14:55), Max: 37.3 (07 Jul 2018 07:15)  T(F): 98.3 (07 Jul 2018 14:55), Max: 99.2 (07 Jul 2018 07:15)  HR: 96 (07 Jul 2018 14:55) (79 - 134)  BP: 102/55 (07 Jul 2018 14:55) (86/53 - 121/86)  BP(mean): 73 (07 Jul 2018 13:00) (64 - 77)  RR: 20 (07 Jul 2018 14:55) (10 - 25)  SpO2: 100% (07 Jul 2018 13:00) (98% - 100%)    GENERAL: Pt lying comfortably, NAD.  CHEST/LUNG: Clear to auscultation bilaterally; No wheezing.  HEART: S1S2+, Regular rate and rhythm; No murmurs.  ABDOMEN: Soft, Nontender, Nondistended; Bowel sounds present.  Extremities: No LE edema.  NEURO: Drowsy /sleepy, unable to complete full neuro exam.       Assessment /plan:  Hospital course as above:    >S/p DKA- C/w subc lantus, LSS + FS monitoring. Endocrine consulted.  >Leukocytosis- Likely reactive, no signs of infection, check UA /CXR, monitor CBC  >Hypokalemia- Resolved  >Metabolic encephalopathy- Improving  >Dehydration- S/p fluid resuscitation, improved.  >ALEJANDRO due to dehydration- Improved.  Monitor renal function. You can access the FollowMyHealth Patient Portal offered by [facility] by registering at the following website: [email or web address]. By joining [employer]’s FollowMyHealth portal, you will also be able to view your health information using other applications (apps) compatible with our system.

## 2020-09-14 NOTE — PROGRESS NOTE ADULT - ASSESSMENT
Patint with recent nstemi. He got IVONE to LM and CX. Now pain that is gi. No MI. He needs remain on his asa and Brillinta. If ERCP needed risk intermediate

## 2020-09-14 NOTE — PROGRESS NOTE ADULT - SUBJECTIVE AND OBJECTIVE BOX
CARDIOLOGY CONSULT NOTE     CHIEF COMPLAINT/REASON FOR CONSULT:    HPI:60-year-old male, Hypertension, Type II DM, Dyslipidemia, CAD S/P LIMA CABG done 12 years ago, He presented 8/2020 stemi. IVONE to LM and cx. Now gi complaints      PAST MEDICAL & SURGICAL HISTORY:  Heart attack    Ulcer of esophagus    Chronic gastric ulcer without hemorrhage and without perforation    High cholesterol    GERD (gastroesophageal reflux disease)    Cardiac abnormality    Chronic obstructive pulmonary disease    H/O right coronary artery stent placement  August 17, 2020    Cardiac arrhythmia  bypass surgery 9 years ago    Coronary artery disease involving other coronary artery bypass graft, angina presence unspecified        Cardiac Risks:   [x ]HTN, [x ] DM, [ ] Smoking, [ ] FH,  [ x] Lipids        MEDICATIONS:  MEDICATIONS  (STANDING):  aspirin  chewable 81 milliGRAM(s) Oral daily  enoxaparin Injectable 40 milliGRAM(s) SubCutaneous every 24 hours  losartan 50 milliGRAM(s) Oral daily  metoprolol tartrate 25 milliGRAM(s) Oral every 12 hours  pantoprazole    Tablet 40 milliGRAM(s) Oral before breakfast  PARoxetine 10 milliGRAM(s) Oral daily  tamsulosin 0.4 milliGRAM(s) Oral at bedtime  ticagrelor 90 milliGRAM(s) Oral every 12 hours      FAMILY HISTORY:  FH: CAD (coronary artery disease) (Grandparent)        SOCIAL HISTORY:      [ ] Marital status   Allergies    No Known Allergies        	    REVIEW OF SYSTEMS:  CONSTITUTIONAL: No fever, weight loss, or fatigue  EYES: No eye pain, visual disturbances, or discharge  ENMT:  No difficulty hearing, tinnitus, vertigo; No sinus or throat pain  NECK: No pain or stiffness  RESPIRATORY: No cough, wheezing, chills or hemoptysis; No Shortness of Breath  CARDIOVASCULAR: No chest pain, palpitations, passing out, dizziness, or leg swelling  GASTROINTESTINAL: See above  GENITOURINARY: No dysuria, frequency, hematuria, or incontinence  NEUROLOGICAL: No headaches, memory loss, loss of strength, numbness, or tremors  SKIN: No itching, burning, rashes, or lesions   	      PHYSICAL EXAM:  T(C): 35.7 (09-14-20 @ 05:00), Max: 36.7 (09-13-20 @ 21:00)  HR: 60 (09-14-20 @ 05:00) (60 - 73)  BP: 106/56 (09-14-20 @ 05:00) (104/54 - 122/68)  RR: 18 (09-14-20 @ 05:00) (18 - 18)  SpO2: --  Wt(kg): --  I&O's Summary      Appearance: Normal	  Psychiatry: A & O x 3, Mood & affect appropriate  HEENT:   Normal oral mucosa, PERRL, EOMI	  Lymphatic: No lymphadenopathy  Cardiovascular: Normal S1 S2,RRR, No JVD, No murmurs  Respiratory: Lungs clear to auscultation	  Gastrointestinal:  Soft, Non-tender, + BS	  Skin: No rashes, No ecchymoses, No cyanosis	  Neurologic: Non-focal  Extremities: Normal range of motion, No clubbing, cyanosis or edema  Vascular: Peripheral pulses palpable 2+ bilaterally      ECG:  	  < from: 12 Lead ECG (09.12.20 @ 14:00) >  Diagnosis Line Normal sinus rhythm  Left anterior fascicular block  Possible Lateral infarct , age undetermined  Inferior infarct , age undetermined  Abnormal ECG    Confirmed by SHELIA ALBERTS MD ([id number]) on 9/13/2020 9:09:14 AM    < end of copied text >    	  LABS:	 	    CARDIAC MARKERS:                                    11.9   9.13  )-----------( 221      ( 14 Sep 2020 06:25 )             38.2     09-14    143  |  107  |  14  ----------------------------<  112<H>  4.4   |  25  |  1.1    Ca    8.8      14 Sep 2020 06:25  Mg     2.3     09-12    TPro  5.9<L>  /  Alb  3.6  /  TBili  0.7  /  DBili  x   /  AST  154<H>  /  ALT  487<H>  /  AlkPhos  226<H>  09-14    PT/INR - ( 14 Sep 2020 06:25 )   PT: 12.20 sec;   INR: 1.06 ratio         PTT - ( 14 Sep 2020 06:25 )  PTT:37.1 sec

## 2020-09-14 NOTE — DISCHARGE NOTE NURSING/CASE MANAGEMENT/SOCIAL WORK - NSDCFUADDAPPT_GEN_ALL_CORE_FT
Patient must follow up with GI, Dr. Llanes, this week on wednesday.   Patient should follow up with his PCP, Dr. Warner, within 1 week.  Patient should also follow up with his cardiologist within 2 weeks.

## 2020-09-17 DIAGNOSIS — K76.0 FATTY (CHANGE OF) LIVER, NOT ELSEWHERE CLASSIFIED: ICD-10-CM

## 2020-09-17 DIAGNOSIS — R07.89 OTHER CHEST PAIN: ICD-10-CM

## 2020-09-17 DIAGNOSIS — R10.13 EPIGASTRIC PAIN: ICD-10-CM

## 2020-09-17 DIAGNOSIS — R07.9 CHEST PAIN, UNSPECIFIED: ICD-10-CM

## 2020-09-17 DIAGNOSIS — I10 ESSENTIAL (PRIMARY) HYPERTENSION: ICD-10-CM

## 2020-09-17 DIAGNOSIS — I25.10 ATHEROSCLEROTIC HEART DISEASE OF NATIVE CORONARY ARTERY WITHOUT ANGINA PECTORIS: ICD-10-CM

## 2020-09-17 DIAGNOSIS — J44.9 CHRONIC OBSTRUCTIVE PULMONARY DISEASE, UNSPECIFIED: ICD-10-CM

## 2020-09-17 DIAGNOSIS — D13.6 BENIGN NEOPLASM OF PANCREAS: ICD-10-CM

## 2020-09-17 DIAGNOSIS — K29.50 UNSPECIFIED CHRONIC GASTRITIS WITHOUT BLEEDING: ICD-10-CM

## 2020-09-17 DIAGNOSIS — Z87.891 PERSONAL HISTORY OF NICOTINE DEPENDENCE: ICD-10-CM

## 2020-09-17 DIAGNOSIS — F39 UNSPECIFIED MOOD [AFFECTIVE] DISORDER: ICD-10-CM

## 2020-09-17 DIAGNOSIS — Z79.84 LONG TERM (CURRENT) USE OF ORAL HYPOGLYCEMIC DRUGS: ICD-10-CM

## 2020-09-17 DIAGNOSIS — E78.00 PURE HYPERCHOLESTEROLEMIA, UNSPECIFIED: ICD-10-CM

## 2020-09-17 DIAGNOSIS — I44.4 LEFT ANTERIOR FASCICULAR BLOCK: ICD-10-CM

## 2020-09-17 DIAGNOSIS — F32.9 MAJOR DEPRESSIVE DISORDER, SINGLE EPISODE, UNSPECIFIED: ICD-10-CM

## 2020-09-17 DIAGNOSIS — K21.9 GASTRO-ESOPHAGEAL REFLUX DISEASE WITHOUT ESOPHAGITIS: ICD-10-CM

## 2020-09-17 DIAGNOSIS — K80.20 CALCULUS OF GALLBLADDER WITHOUT CHOLECYSTITIS WITHOUT OBSTRUCTION: ICD-10-CM

## 2020-09-17 DIAGNOSIS — Z79.82 LONG TERM (CURRENT) USE OF ASPIRIN: ICD-10-CM

## 2020-09-17 DIAGNOSIS — K86.2 CYST OF PANCREAS: ICD-10-CM

## 2020-09-17 DIAGNOSIS — Z95.1 PRESENCE OF AORTOCORONARY BYPASS GRAFT: ICD-10-CM

## 2020-09-17 DIAGNOSIS — R93.2 ABNORMAL FINDINGS ON DIAGNOSTIC IMAGING OF LIVER AND BILIARY TRACT: ICD-10-CM

## 2020-09-17 DIAGNOSIS — R79.89 OTHER SPECIFIED ABNORMAL FINDINGS OF BLOOD CHEMISTRY: ICD-10-CM

## 2020-09-17 DIAGNOSIS — E11.9 TYPE 2 DIABETES MELLITUS WITHOUT COMPLICATIONS: ICD-10-CM

## 2020-09-17 DIAGNOSIS — E80.7 DISORDER OF BILIRUBIN METABOLISM, UNSPECIFIED: ICD-10-CM

## 2020-09-17 DIAGNOSIS — N40.0 BENIGN PROSTATIC HYPERPLASIA WITHOUT LOWER URINARY TRACT SYMPTOMS: ICD-10-CM

## 2020-09-17 DIAGNOSIS — R74.0 NONSPECIFIC ELEVATION OF LEVELS OF TRANSAMINASE AND LACTIC ACID DEHYDROGENASE [LDH]: ICD-10-CM

## 2020-09-17 DIAGNOSIS — Z95.5 PRESENCE OF CORONARY ANGIOPLASTY IMPLANT AND GRAFT: ICD-10-CM

## 2020-09-17 DIAGNOSIS — I21.4 NON-ST ELEVATION (NSTEMI) MYOCARDIAL INFARCTION: ICD-10-CM

## 2020-09-24 ENCOUNTER — TRANSCRIPTION ENCOUNTER (OUTPATIENT)
Age: 60
End: 2020-09-24

## 2020-09-24 ENCOUNTER — INPATIENT (INPATIENT)
Facility: HOSPITAL | Age: 60
LOS: 2 days | Discharge: HOME | End: 2020-09-27
Attending: INTERNAL MEDICINE | Admitting: INTERNAL MEDICINE
Payer: MEDICAID

## 2020-09-24 VITALS
SYSTOLIC BLOOD PRESSURE: 105 MMHG | RESPIRATION RATE: 18 BRPM | DIASTOLIC BLOOD PRESSURE: 57 MMHG | WEIGHT: 195.99 LBS | OXYGEN SATURATION: 99 % | HEIGHT: 67 IN | HEART RATE: 64 BPM | TEMPERATURE: 99 F

## 2020-09-24 DIAGNOSIS — I49.9 CARDIAC ARRHYTHMIA, UNSPECIFIED: Chronic | ICD-10-CM

## 2020-09-24 DIAGNOSIS — I25.810 ATHEROSCLEROSIS OF CORONARY ARTERY BYPASS GRAFT(S) WITHOUT ANGINA PECTORIS: Chronic | ICD-10-CM

## 2020-09-24 DIAGNOSIS — Z95.5 PRESENCE OF CORONARY ANGIOPLASTY IMPLANT AND GRAFT: Chronic | ICD-10-CM

## 2020-09-24 LAB
ALBUMIN SERPL ELPH-MCNC: 3.9 G/DL — SIGNIFICANT CHANGE UP (ref 3.5–5.2)
ALP SERPL-CCNC: 221 U/L — HIGH (ref 30–115)
ALT FLD-CCNC: 266 U/L — HIGH (ref 0–41)
ANION GAP SERPL CALC-SCNC: 11 MMOL/L — SIGNIFICANT CHANGE UP (ref 7–14)
AST SERPL-CCNC: 303 U/L — HIGH (ref 0–41)
BASOPHILS # BLD AUTO: 0.08 K/UL — SIGNIFICANT CHANGE UP (ref 0–0.2)
BASOPHILS NFR BLD AUTO: 0.8 % — SIGNIFICANT CHANGE UP (ref 0–1)
BILIRUB DIRECT SERPL-MCNC: 0.6 MG/DL — HIGH (ref 0–0.2)
BILIRUB INDIRECT FLD-MCNC: 0.8 MG/DL — SIGNIFICANT CHANGE UP (ref 0.2–1.2)
BILIRUB SERPL-MCNC: 1.4 MG/DL — HIGH (ref 0.2–1.2)
BUN SERPL-MCNC: 17 MG/DL — SIGNIFICANT CHANGE UP (ref 10–20)
CALCIUM SERPL-MCNC: 9.5 MG/DL — SIGNIFICANT CHANGE UP (ref 8.5–10.1)
CHLORIDE SERPL-SCNC: 104 MMOL/L — SIGNIFICANT CHANGE UP (ref 98–110)
CO2 SERPL-SCNC: 25 MMOL/L — SIGNIFICANT CHANGE UP (ref 17–32)
CREAT SERPL-MCNC: 1.2 MG/DL — SIGNIFICANT CHANGE UP (ref 0.7–1.5)
EOSINOPHIL # BLD AUTO: 0.37 K/UL — SIGNIFICANT CHANGE UP (ref 0–0.7)
EOSINOPHIL NFR BLD AUTO: 3.5 % — SIGNIFICANT CHANGE UP (ref 0–8)
GLUCOSE BLDC GLUCOMTR-MCNC: 83 MG/DL — SIGNIFICANT CHANGE UP (ref 70–99)
GLUCOSE BLDC GLUCOMTR-MCNC: 97 MG/DL — SIGNIFICANT CHANGE UP (ref 70–99)
GLUCOSE SERPL-MCNC: 106 MG/DL — HIGH (ref 70–99)
HCT VFR BLD CALC: 39.5 % — LOW (ref 42–52)
HGB BLD-MCNC: 12.1 G/DL — LOW (ref 14–18)
IMM GRANULOCYTES NFR BLD AUTO: 0.7 % — HIGH (ref 0.1–0.3)
LACTATE SERPL-SCNC: 1.2 MMOL/L — SIGNIFICANT CHANGE UP (ref 0.7–2)
LIDOCAIN IGE QN: 46 U/L — SIGNIFICANT CHANGE UP (ref 7–60)
LYMPHOCYTES # BLD AUTO: 19 % — LOW (ref 20.5–51.1)
LYMPHOCYTES # BLD AUTO: 2.02 K/UL — SIGNIFICANT CHANGE UP (ref 1.2–3.4)
MCHC RBC-ENTMCNC: 26.2 PG — LOW (ref 27–31)
MCHC RBC-ENTMCNC: 30.6 G/DL — LOW (ref 32–37)
MCV RBC AUTO: 85.7 FL — SIGNIFICANT CHANGE UP (ref 80–94)
MONOCYTES # BLD AUTO: 0.92 K/UL — HIGH (ref 0.1–0.6)
MONOCYTES NFR BLD AUTO: 8.7 % — SIGNIFICANT CHANGE UP (ref 1.7–9.3)
NEUTROPHILS # BLD AUTO: 7.16 K/UL — HIGH (ref 1.4–6.5)
NEUTROPHILS NFR BLD AUTO: 67.3 % — SIGNIFICANT CHANGE UP (ref 42.2–75.2)
NRBC # BLD: 0 /100 WBCS — SIGNIFICANT CHANGE UP (ref 0–0)
PLATELET # BLD AUTO: 345 K/UL — SIGNIFICANT CHANGE UP (ref 130–400)
POTASSIUM SERPL-MCNC: 4.5 MMOL/L — SIGNIFICANT CHANGE UP (ref 3.5–5)
POTASSIUM SERPL-SCNC: 4.5 MMOL/L — SIGNIFICANT CHANGE UP (ref 3.5–5)
PROT SERPL-MCNC: 6.4 G/DL — SIGNIFICANT CHANGE UP (ref 6–8)
RBC # BLD: 4.61 M/UL — LOW (ref 4.7–6.1)
RBC # FLD: 13.9 % — SIGNIFICANT CHANGE UP (ref 11.5–14.5)
SODIUM SERPL-SCNC: 140 MMOL/L — SIGNIFICANT CHANGE UP (ref 135–146)
TROPONIN T SERPL-MCNC: <0.01 NG/ML — SIGNIFICANT CHANGE UP
WBC # BLD: 10.62 K/UL — SIGNIFICANT CHANGE UP (ref 4.8–10.8)
WBC # FLD AUTO: 10.62 K/UL — SIGNIFICANT CHANGE UP (ref 4.8–10.8)

## 2020-09-24 PROCEDURE — 99285 EMERGENCY DEPT VISIT HI MDM: CPT

## 2020-09-24 PROCEDURE — 76705 ECHO EXAM OF ABDOMEN: CPT | Mod: 26

## 2020-09-24 PROCEDURE — 93010 ELECTROCARDIOGRAM REPORT: CPT

## 2020-09-24 RX ORDER — PANTOPRAZOLE SODIUM 20 MG/1
40 TABLET, DELAYED RELEASE ORAL DAILY
Refills: 0 | Status: DISCONTINUED | OUTPATIENT
Start: 2020-09-24 | End: 2020-09-27

## 2020-09-24 RX ORDER — ONDANSETRON 8 MG/1
4 TABLET, FILM COATED ORAL ONCE
Refills: 0 | Status: COMPLETED | OUTPATIENT
Start: 2020-09-24 | End: 2020-09-24

## 2020-09-24 RX ORDER — LOSARTAN POTASSIUM 100 MG/1
50 TABLET, FILM COATED ORAL DAILY
Refills: 0 | Status: DISCONTINUED | OUTPATIENT
Start: 2020-09-24 | End: 2020-09-27

## 2020-09-24 RX ORDER — MORPHINE SULFATE 50 MG/1
4 CAPSULE, EXTENDED RELEASE ORAL ONCE
Refills: 0 | Status: DISCONTINUED | OUTPATIENT
Start: 2020-09-24 | End: 2020-09-24

## 2020-09-24 RX ORDER — METOPROLOL TARTRATE 50 MG
25 TABLET ORAL
Refills: 0 | Status: DISCONTINUED | OUTPATIENT
Start: 2020-09-24 | End: 2020-09-27

## 2020-09-24 RX ORDER — ATORVASTATIN CALCIUM 80 MG/1
40 TABLET, FILM COATED ORAL AT BEDTIME
Refills: 0 | Status: DISCONTINUED | OUTPATIENT
Start: 2020-09-24 | End: 2020-09-25

## 2020-09-24 RX ORDER — ATORVASTATIN CALCIUM 80 MG/1
80 TABLET, FILM COATED ORAL AT BEDTIME
Refills: 0 | Status: DISCONTINUED | OUTPATIENT
Start: 2020-09-24 | End: 2020-09-24

## 2020-09-24 RX ORDER — ASPIRIN/CALCIUM CARB/MAGNESIUM 324 MG
81 TABLET ORAL DAILY
Refills: 0 | Status: DISCONTINUED | OUTPATIENT
Start: 2020-09-24 | End: 2020-09-27

## 2020-09-24 RX ORDER — SODIUM CHLORIDE 9 MG/ML
1000 INJECTION, SOLUTION INTRAVENOUS
Refills: 0 | Status: DISCONTINUED | OUTPATIENT
Start: 2020-09-24 | End: 2020-09-27

## 2020-09-24 RX ORDER — ENOXAPARIN SODIUM 100 MG/ML
40 INJECTION SUBCUTANEOUS DAILY
Refills: 0 | Status: DISCONTINUED | OUTPATIENT
Start: 2020-09-24 | End: 2020-09-27

## 2020-09-24 RX ORDER — TICAGRELOR 90 MG/1
90 TABLET ORAL EVERY 12 HOURS
Refills: 0 | Status: DISCONTINUED | OUTPATIENT
Start: 2020-09-24 | End: 2020-09-27

## 2020-09-24 RX ORDER — ONDANSETRON 8 MG/1
4 TABLET, FILM COATED ORAL EVERY 6 HOURS
Refills: 0 | Status: DISCONTINUED | OUTPATIENT
Start: 2020-09-24 | End: 2020-09-27

## 2020-09-24 RX ADMIN — PANTOPRAZOLE SODIUM 40 MILLIGRAM(S): 20 TABLET, DELAYED RELEASE ORAL at 12:45

## 2020-09-24 RX ADMIN — SODIUM CHLORIDE 75 MILLILITER(S): 9 INJECTION, SOLUTION INTRAVENOUS at 12:48

## 2020-09-24 RX ADMIN — TICAGRELOR 90 MILLIGRAM(S): 90 TABLET ORAL at 18:41

## 2020-09-24 RX ADMIN — MORPHINE SULFATE 4 MILLIGRAM(S): 50 CAPSULE, EXTENDED RELEASE ORAL at 05:45

## 2020-09-24 RX ADMIN — Medication 10 MILLIGRAM(S): at 12:46

## 2020-09-24 RX ADMIN — ENOXAPARIN SODIUM 40 MILLIGRAM(S): 100 INJECTION SUBCUTANEOUS at 12:45

## 2020-09-24 RX ADMIN — Medication 81 MILLIGRAM(S): at 12:45

## 2020-09-24 RX ADMIN — ONDANSETRON 4 MILLIGRAM(S): 8 TABLET, FILM COATED ORAL at 03:45

## 2020-09-24 RX ADMIN — MORPHINE SULFATE 4 MILLIGRAM(S): 50 CAPSULE, EXTENDED RELEASE ORAL at 04:46

## 2020-09-24 RX ADMIN — Medication 30 MILLILITER(S): at 10:16

## 2020-09-24 RX ADMIN — ATORVASTATIN CALCIUM 40 MILLIGRAM(S): 80 TABLET, FILM COATED ORAL at 21:17

## 2020-09-24 NOTE — DISCHARGE NOTE PROVIDER - CARE PROVIDER_API CALL
Filemon Giordano  GASTROENTEROLOGY  [address] [place] [address]  Phone: [phone]  Fax: [phone]  Follow Up Time: 1 week

## 2020-09-24 NOTE — ED PROVIDER NOTE - CARE PLAN
Principal Discharge DX:	RUQ pain  Secondary Diagnosis:	Transaminitis   Principal Discharge DX:	RUQ pain  Secondary Diagnosis:	Transaminitis  Secondary Diagnosis:	Gallstones  Secondary Diagnosis:	Hyperbilirubinemia

## 2020-09-24 NOTE — ED ADULT NURSE NOTE - OBJECTIVE STATEMENT
PT presents c/o epigastric dull pain radiating to the back associated with nausea, vomiting  , AO x 4 , denies chest pain , no labored breathing , speaks in full sentences , denies dysuria , hematuria , denies diarrhea , denies bloody vomitus , denies bloody stool , no SOB , no cough , denies dizziness denies headache

## 2020-09-24 NOTE — ED ADULT NURSE REASSESSMENT NOTE - NS ED NURSE REASSESS COMMENT FT1
covering for primary nurse Vivian for break c/o abdominal pain, nausea, and vomiting , IV inserted , labs drawn done ,meds given

## 2020-09-24 NOTE — H&P ADULT - NSICDXPASTSURGICALHX_GEN_ALL_CORE_FT
PAST SURGICAL HISTORY:  Cardiac arrhythmia bypass surgery 9 years ago    Coronary artery disease involving other coronary artery bypass graft, angina presence unspecified     H/O right coronary artery stent placement August 17, 2020

## 2020-09-24 NOTE — ED PROVIDER NOTE - PROGRESS NOTE DETAILS
WILLIAN: discussed with GI fellow he recommedns admission and Dr. Giordano will round on pt and decide if candidate for ERCP now. discussed with MAR and with Dr. escalante WILLIAN: discussed with GI fellow he recommends admission and Dr. Giordano will round on pt and decide if candidate for ERCP now. discussed with MAR and with Dr. escalante. MAR will follow US.

## 2020-09-24 NOTE — DISCHARGE NOTE PROVIDER - HOSPITAL COURSE
60 y.o male patient with PMH of CAD s/p CABG in 2008 (LIMA to LAD), DM, HTN, HLD, GERD, hiatal hernia, recent admission in August for STEMI s/p PCI to LCx and LM, presented to the hospital for RUQ and epigastric pain that started yesterday after a heavy meal. According to the patient the pain is severe in intensity ,dull in nature, non radiating, associated with nausea  and non bilous, non bloody vomiting. Pain typically lasts for 1 hour then comes back.  No fever, no chills, no other complains.  To note that the patient had similar complains during last admission 10 days ago at the [facility]. Patient was founfd to have Transaminitis and cholestatic pattern. MRCP done last time showed Punctate hypointensities within CBD on T2-weighted images, likely artifactual. No definite choledocholithiasis. RUQ ultrasound appreciated CBD 5mm not dilated    EUS +/- ERCP was suggested pending cardiology clearance.  In the ED patient was afebrile, hemodynamically stable pain was completely resolved after morphine. Labs were done significant to transaminitis SGOT/SGPT 3030/ 266 , bilirubin 1.4.  Patient was admitted for further management.    ROS: no SOB, no cough, no chest pain, no urinary symptoms, no change in bowel habits. Patient only complains of bilateral lower extremity claudication with mild exertion.    Patient was seen by gastroenterology and recommended    60 y.o male patient with PMH of CAD s/p CABG in 2008 (LIMA to LAD), DM, HTN, HLD, GERD, hiatal hernia, recent admission in August for STEMI s/p PCI to LCx and LM, presented to the hospital for RUQ and epigastric pain that started yesterday after a heavy meal. According to the patient the pain is severe in intensity ,dull in nature, non radiating, associated with nausea  and non bilous, non bloody vomiting. Pain typically lasts for 1 hour then comes back.  No fever, no chills, no other complains.  To note that the patient had similar complains during last admission 10 days ago at the [facility]. Patient was founfd to have Transaminitis and cholestatic pattern. MRCP done last time showed Punctate hypointensities within CBD on T2-weighted images, likely artifactual. No definite choledocholithiasis. RUQ ultrasound appreciated CBD 5mm not dilated    EUS +/- ERCP was suggested pending cardiology clearance.  In the ED patient was afebrile, hemodynamically stable pain was completely resolved after morphine. Labs were done significant to transaminitis SGOT/SGPT 3030/ 266 , bilirubin 1.4.  Patient was admitted for further management.    ROS: no SOB, no cough, no chest pain, no urinary symptoms, no change in bowel habits. Patient only complains of bilateral lower extremity claudication with mild exertion.  mrcp done and showed dilated cbd of 8mm with cholelithiasis but no choledocholithiasis or signs of infection     surgery recommended ERCP , and there is no surgical intervention   cardiology  recommended deffering any surgey till 3-6 months after pci( he had pci last month )   60 year old  male patient with PMH of CAD s/p CABG in 2008 (LIMA to LAD), DM, HTN, HLD, GERD, hiatal hernia, recent admission in August for STEMI s/p PCI to LCx and LM, presented to the hospital for RUQ and epigastric pain that started yesterday after a heavy meal. According to the patient the pain is severe in intensity ,dull in nature, non radiating, associated with nausea  and non bilous, non bloody vomiting. Pain typically lasts for 1 hour then comes back.  No fever, no chills, no other complains.  To note that the patient had similar complains during last admission 10 days ago at the [facility]. Patient was found to have Transaminitis and cholestatic pattern. MRCP done last time showed Punctate hypointensities within CBD on T2-weighted images, likely artifactual. No definite choledocholithiasis. RUQ ultrasound appreciated CBD 5mm not dilated. In the ED patient was afebrile, hemodynamically stable pain was completely resolved after morphine. Labs were done significant to transaminitis SGOT/SGPT 3030/ 266 , bilirubin 1.4.Patient was admitted for further management.    Transaminitis was found to be secondary to choledocholithiasis( stone may have passed).MRCP repeat showing  no evidence of choledocholithiasis but shows  Cholelithiasis  with Minimal dilatation of CBD.LFt's downtrending. ERCP not warranted at this time. Surgery did not recommend any intervention at this time. continue with DAPT for at leat a year on discharge.  Medically stable for discharge,  Attending Attestation:  Patient was seen & examined independently. At least 10 systems were reviewed in ROS. All systems reviewed  are within normal limits. Latest vital signs and labs were reviewed today. Case was discussed with house staff in morning rounds for assessment and plan.  Patient is medically stable for discharge . About 34 mins spent on discharge disposition.

## 2020-09-24 NOTE — ED PROVIDER NOTE - CLINICAL SUMMARY MEDICAL DECISION MAKING FREE TEXT BOX
upper abd pain, known gallstone complic by transminitis requiring recent admission, unable to receive ERCP/cholecystectomy due to recent cardiac stenting 8/17/20 on brillinta - labs today showing persistent/slightly worsened transaminitis & new mild hyperbilirubinemia - d/w GI fellow Dr. Michoacano Benjamin, rec admission for further GI eval & mgmt as inpatient - endorsed to MAR, will f/u abd US results

## 2020-09-24 NOTE — H&P ADULT - NSICDXPASTMEDICALHX_GEN_ALL_CORE_FT
PAST MEDICAL HISTORY:  CAD S/P percutaneous coronary angioplasty     Chronic gastric ulcer without hemorrhage and without perforation     DM (diabetes mellitus)     GERD (gastroesophageal reflux disease)     Hiatal hernia with GERD     High cholesterol     Ulcer of esophagus

## 2020-09-24 NOTE — DISCHARGE NOTE PROVIDER - NSDCMRMEDTOKEN_GEN_ALL_CORE_FT
aspirin 81 mg oral delayed release tablet: 1 tab(s) orally once a day   atorvastatin 80 mg oral tablet: 1 tab(s) orally once a day (at bedtime)  Janumet 50 mg-500 mg oral tablet: 1 tab(s) orally 2 times a day  losartan 50 mg oral tablet: 1 tab(s) orally once a day  metoprolol tartrate 25 mg oral tablet: 1 tab(s) orally 2 times a day  ticagrelor 90 mg oral tablet: 1 tab(s) orally 2 times a day MDD:2   aspirin 81 mg oral delayed release tablet: 1 tab(s) orally once a day   Janumet 50 mg-500 mg oral tablet: 1 tab(s) orally 2 times a day  Lipitor 40 mg oral tablet: 1 tab(s) orally once a day  losartan 50 mg oral tablet: 1 tab(s) orally once a day  metoprolol tartrate 25 mg oral tablet: 1 tab(s) orally 2 times a day  pantoprazole 40 mg oral delayed release tablet: 1 tab(s) orally once a day   ticagrelor 90 mg oral tablet: 1 tab(s) orally 2 times a day MDD:2

## 2020-09-24 NOTE — DISCHARGE NOTE PROVIDER - NSDCCPCAREPLAN_GEN_ALL_CORE_FT
PRINCIPAL DISCHARGE DIAGNOSIS  Diagnosis: RUQ pain  Assessment and Plan of Treatment: please follow up with gastroenterology clinic in 1-2 week, also follow up with primary care doctor      SECONDARY DISCHARGE DIAGNOSES  Diagnosis: Hyperbilirubinemia  Assessment and Plan of Treatment:     Diagnosis: Gallstones  Assessment and Plan of Treatment:     Diagnosis: Transaminitis  Assessment and Plan of Treatment:      PRINCIPAL DISCHARGE DIAGNOSIS  Diagnosis: RUQ pain  Assessment and Plan of Treatment: you presented for right uppper quadrant pain. Your liver function tests were high, so MRCP done and showed dilated common bile duct with stones in the gallbladder. Cardiologist recommeneded not to anysurgeies now because of the medications you are taking for the stents . Followup with the cardiologists, the surgeons and the gastroenterologists.       PRINCIPAL DISCHARGE DIAGNOSIS  Diagnosis: RUQ pain  Assessment and Plan of Treatment: you presented for right uppper quadrant pain. Your liver function tests were high, so MRCP done and showed minimal dilated common bile duct with stones in the gallbladder but no stone was seen in bile duct. Surgery did not recommend intervention at this time. ERCP was also not warrtanted. Your liver function showed improcveemnt.You have to follow up with surgery & GI as outpatient.

## 2020-09-24 NOTE — ED ADULT NURSE NOTE - NSIMPLEMENTINTERV_GEN_ALL_ED
Implemented All Universal Safety Interventions:  [place] to call system. Call bell, personal items and telephone within reach. Instruct patient to call for assistance. Room bathroom lighting operational. Non-slip footwear when patient is off stretcher. Physically safe environment: no spills, clutter or unnecessary equipment. Stretcher in lowest position, wheels locked, appropriate side rails in place.

## 2020-09-24 NOTE — ED PROVIDER NOTE - NS ED ROS FT
Review of Systems    Constitutional: (-) fever   Eyes/ENT: (-) vision changes   Cardiovascular: (-) chest pain, (-) syncope (-) palpitations  Respiratory: (-) cough, (-) shortness of breath  Gastrointestinal: (-) vomiting, (-) diarrhea (-)black/bloody stools (+) abdominal pain  Genitourinary:  (-) dysuria   Musculoskeletal: (-) neck pain, (-) back pain, (-) leg pain/swelling  Integumentary: (-) rash, (-) edema  Neurological: (-) headache, (-) confusion  Hematologic: (-) easy bruising   Allergic/Immunologic: (-) pruritus

## 2020-09-24 NOTE — H&P ADULT - ASSESSMENT
60y Male with PMH of DM, DLD, CAD s/p PCI in August, GERD, Chololithiasis, presents to the ED with RUQ and epigastric pain that was a 7/10 non radiating. Patient reports pain was worse with food. Currently patient reports pain resolved with morphine .    #RUQ and epigastric pain  -Similar episode 10 days ago  -Likely biliary colic given his history of cholelithiasis   -Transaminitis noted on labs could be secondary to hepatic steatosis   -MRCP 9/13 with Punctate hypointensity within CBD on T2-weighted images, likely artifactual. No definite choledocholithiasis.  -US Abdomen Limited (09.12.20 @ 09:34) >Cholelithiasis and sludge without sonographic evidence of acute cholecystitis. No biliary ductal dilatation and Hepatic steatosis.  -Hep C Ab non reactive  -will check Hepatitis B Sag, Hep B SAB, Hep A Ab, Smooth Muscle Antibody,  AMA, ERWIN, Ceruloplasmin, Ferritin, Alpha-1-antitrypsin, CMV, Herpes serologies, EBV serologies, as ordered during last admission but not done  -Avoid hepatotoxic medications  -Patient might need ERCP /EUS and repeat MRCP   -Consult GI Dr. Giordano   -Keep NPO for now    #CAD s/p CABG and recent STEMI s/p PCI  -Continue DAPT (ASA, Brilinta)  -On statin 80 mg--> consider decrease to 40 mg daily  -Continue betablocker (metoprolol) and ARB ( losartan)  -Transthoracic Echocardiogram (08.17.20 @ 09:51) >Left ventricular ejection fraction, by visual estimation, is 55 to 60%.    #GERD  -Patient currently symptomatic  -Start Protonix 40 mg daily    #DM  -On Janumet 50/500 po BID  -Patient only takes it OD  -Last A1c IN 8/17  WAS 6.5  -Patient in NPO currently and blood sugar controlled  -Keep -180  -If >180 start insulin    # Pancreatic body 0.9 cm cyst, likely side branch IPMN  -Out patient followup    #DVT prophylaxis Lovenox  #Diet: NPO  #Activity: as tolerated  #PPI Protonix   60y Male with PMH of DM, DLD, CAD s/p PCI in August, GERD, Chololithiasis, presents to the ED with RUQ and epigastric pain that was a 7/10 non radiating. Patient reports pain was worse with food. Currently patient reports pain resolved with morphine .    #RUQ and epigastric pain  -Similar episode 10 days ago  -Likely biliary colic given his history of cholelithiasis   -Transaminitis noted on labs could be secondary to hepatic steatosis   -MRCP 9/13 with Punctate hypointensity within CBD on T2-weighted images, likely artifactual. No definite choledocholithiasis.  -US Abdomen Limited (09.12.20 @ 09:34) >Cholelithiasis and sludge without sonographic evidence of acute cholecystitis. No biliary ductal dilatation and Hepatic steatosis.  -Hep C Ab non reactive  -will check Hepatitis B Sag, Hep B SAB, Hep A Ab, Smooth Muscle Antibody,  AMA, ERWIN, Ceruloplasmin, Ferritin, Alpha-1-antitrypsin, CMV, Herpes serologies, EBV serologies, as ordered during last admission but not done  -Avoid hepatotoxic medications  -Patient might need ERCP /EUS and repeat MRCP   -Consult GI Dr. Giordano   -Keep NPO for now    #CAD s/p CABG and recent STEMI s/p PCI  -Continue DAPT (ASA, Brilinta)  -On statin 80 mg--> consider decrease to 40 mg daily  -Continue betablocker (metoprolol) and ARB ( losartan)  -Transthoracic Echocardiogram (08.17.20 @ 09:51) >Left ventricular ejection fraction, by visual estimation, is 55 to 60%  -If ERCP is planned-->consult cardiology for clearance since patient is on DAPT (Dr. clancy)    #GERD  -Patient currently symptomatic  -Start Protonix 40 mg daily    #DM  -On Janumet 50/500 po BID  -Patient only takes it OD  -Last A1c IN 8/17  WAS 6.5  -Patient in NPO currently and blood sugar controlled  -Keep -180  -If >180 start insulin    # Pancreatic body 0.9 cm cyst, likely side branch IPMN  -Out patient followup    #DVT prophylaxis Lovenox  #Diet: NPO  #Activity: as tolerated  #PPI Protonix   60y Male with PMH of DM, DLD, CAD s/p PCI in August, GERD, Chololithiasis, presents to the ED with RUQ and epigastric pain that was a 7/10 non radiating. Patient reports pain was worse with food. Currently patient reports pain resolved with morphine .    #RUQ and epigastric pain  -Similar episode 10 days ago  -Likely biliary colic given his history of cholelithiasis   -Transaminitis noted on labs could be secondary to hepatic steatosis   -MRCP 9/13 with Punctate hypointensity within CBD on T2-weighted images, likely artifactual. No definite choledocholithiasis.  -US Abdomen Limited (09.12.20 @ 09:34) >Cholelithiasis and sludge without sonographic evidence of acute cholecystitis. No biliary ductal dilatation and Hepatic steatosis.  -US Abdomen Limited (09.24.20 @ 06:48) >  Cholelithiasis. No pericholecystic fluid and a negative Sheth's sign. If there is continued clinical concern for acute cholecystitis, a HIDA scan may be obtained for further evaluation.  -Hep C Ab non reactive  -will check Hepatitis B Sag, Hep B SAB, Hep A Ab, Smooth Muscle Antibody,  AMA, ERWIN, Ceruloplasmin, Ferritin, Alpha-1-antitrypsin, CMV, Herpes serologies, EBV serologies, as ordered during last admission but not done  -Avoid hepatotoxic medications  -Patient might need ERCP /EUS and repeat MRCP   -Consult GI Dr. Giordano   -Keep NPO for now    #CAD s/p CABG and recent STEMI s/p PCI  -Continue DAPT (ASA, Brilinta)  -On statin 80 mg--> consider decrease to 40 mg daily  -Continue betablocker (metoprolol) and ARB ( losartan)  -Transthoracic Echocardiogram (08.17.20 @ 09:51) >Left ventricular ejection fraction, by visual estimation, is 55 to 60%  -If ERCP is planned-->consult cardiology for clearance since patient is on DAPT (Dr. clancy)    #GERD  -Patient currently symptomatic  -Start Protonix 40 mg daily    #DM  -On Janumet 50/500 po BID  -Patient only takes it OD  -Last A1c IN 8/17  WAS 6.5  -Patient in NPO currently and blood sugar controlled  -Keep -180  -If >180 start insulin    # Pancreatic body 0.9 cm cyst, likely side branch IPMN  -Out patient followup    #DVT prophylaxis Lovenox  #Diet: NPO  #Activity: as tolerated  #PPI Protonix

## 2020-09-24 NOTE — CHART NOTE - NSCHARTNOTEFT_GEN_A_CORE
Spoke  with Dr. Giordano medical team. Attending will  see  patient tomorrow AM. Spoke  with Dr. Giordano  who will  see  patient tomorrow AM.   Wanted MRCP ordered

## 2020-09-24 NOTE — H&P ADULT - NSHPLABSRESULTS_GEN_ALL_CORE
Complete Blood Count + Automated Diff (09.24.20 @ 04:04)    WBC Count: 10.62 K/uL    Hemoglobin: 12.1 g/dL    Hematocrit: 39.5 %    Mean Cell Volume: 85.7 fL    Platelet Count - Automated: 345 K/uL    Auto Neutrophil %: 67.3: Differential percentages must be correlated with absolute numbers for  clinical significance. %      Comprehensive Metabolic Panel in AM (09.14.20 @ 06:25)   Sodium, Serum: 143 mmol/L   Potassium, Serum: 4.4 mmol/L   Chloride, Serum: 107 mmol/L   Carbon Dioxide, Serum: 25 mmol/L   Anion Gap, Serum: 11 mmol/L   Blood Urea Nitrogen, Serum: 14 mg/dL   Creatinine, Serum: 1.1 mg/dL   Glucose, Serum: 112 mg/dL   Calcium, Total Serum: 8.8 mg/dL   Protein Total, Serum: 5.9 g/dL   Albumin, Serum: 3.6 g/dL   Bilirubin Total, Serum: 0.7 mg/dL   Alkaline Phosphatase, Serum: 226 U/L   Aspartate Aminotransferase (AST/SGOT): 154 U/L   Alanine Aminotransferase (ALT/SGPT): 487 U/L     Troponin T, Serum: <0.01 ng/mL (09.24.20 @ 04:04)     < from: 12 Lead ECG (09.24.20 @ 02:51) >  Ventricular Rate 55 BPM  QTC Calculation(Bazett) 466 ms  Sinus bradycardia  Left anterior fascicular block  Possible Lateral infarct , age undetermined  Inferior-posterior infarct , age undetermined        Radiology:    < from: MR Abdomen No Cont (09.13.20 @ 12:03) >  Cholelithiasis without evidence for cholecystitis.  No biliary ductal dilatation.  Motion degraded MRCP images. Punctate hypointensities within CBD on T2-weighted images, likely artifactual. No definite choledocholithiasis.  Pancreatic body 0.9 cm cyst, likely side branch IPMN.    US Abdomen Limited (09.12.20 @ 09:34) >  Cholelithiasis and sludge without sonographic evidence of acute cholecystitis. No biliary ductal dilatation.  Hepatic steatosis.     CT Angio Chest Dissection Protocol (09.11.20 @ 22:25) >  No evidence of aortic aneurysm or dissection.  Cholelithiasis.    Xray Chest 1 View- PORTABLE-Urgent (Xray Chest 1 View- PORTABLE-Urgent .) (09.11.20 @ 20:46) >  No radiographic evidence of acute cardiopulmonary disease.

## 2020-09-24 NOTE — ED PROVIDER NOTE - OBJECTIVE STATEMENT
61 y/o M with PMH CAD with PCI 8/17/2020 on asa and brilinta, esophageal ulcers/GERD on prevacid, COPD not on home O2, cholelithiasis, admitted for CP/epigastric pain with transaminitis/MRCP showing punctate biliary hypodensities, ERCP unable to be performed due to inability to hold brilinta due to recent stent, here with epigastric constant burning moderate abdominal pain radiating to back x 1130pm. +diaphoresis/nausea/1 episode of non-bloody non-bilious vomiting.    PMD Timmy.  [facility] [place]

## 2020-09-24 NOTE — H&P ADULT - NSHPPHYSICALEXAM_GEN_ALL_CORE
GENERAL: Comfortable, not in pain.  SKIN: Warm, pink and dry.  NECK: no JVD, no palpable thyroid  Pulm: Good bilateral air entry, no wheezes, stridor, or retractions  CV: RRR, no murmur  Abd: soft, mild epigastric tenderness on deep palpation. chakraborty negative. no rebound/guarding. non-distended. no CVA tenderness.   Extremities: week pedial pulses bilaterally. No edema  Neuro: AAOx3,  normal gait.

## 2020-09-24 NOTE — H&P ADULT - HISTORY OF PRESENT ILLNESS
60 y.o male patient with PMH of CAD s/p CABG in 2008 (LIMA to LAD), DM, HTN, HLD, GERD, hiatal hernia, recent admission in August for STEMI s/p PCI to LCx and LM, presented to the hospital for RUQ and epigastric pain that started yesterday after a heavy meal. According to the patient the pain is severe in intensity , dull in nature, non radiating, associated with nausea  and non bilous, non bloody vomiting. Pain typically lasts for 1 hour then comes back.  No fever, no chills, no other complains.  To note that the patient had similar complains during last admission 10 days ago at the [facility]. Patient was founfd to have Transaminitis and cholestatic pattern. MRCP done last time showed Punctate hypointensities within CBD on T2-weighted images, likely artifactual. No definite choledocholithiasis. RUQ ultrasound appreciated CBD 5mm not dilated    EUS +/- ERCP was suggested pending cardiology clearance.  In the ED patient was afebrile, hemodynamically stable pain was completely resolved after morphine. Labs were done significant to transaminitis SGOT/SGPT 3030/ 266 , bilirubin 1.4.  Patient was admitted for further management.    ROS: no SOB, no cough, no chest pain, no urinary symptoms, no change in bowel habits. Patient only complains of bilateral lower extremity claudication with mild exertion.       60 y.o male patient with PMH of CAD s/p CABG in 2008 (LIMA to LAD), DM, HTN, HLD, GERD, hiatal hernia, recent admission in August for STEMI s/p PCI to LCx and LM, presented to the hospital for RUQ and epigastric pain that started yesterday after a heavy meal. According to the patient the pain is severe in intensity ,dull in nature, non radiating, associated with nausea  and non bilous, non bloody vomiting. Pain typically lasts for 1 hour then comes back.  No fever, no chills, no other complains.  To note that the patient had similar complains during last admission 10 days ago at the [facility]. Patient was founfd to have Transaminitis and cholestatic pattern. MRCP done last time showed Punctate hypointensities within CBD on T2-weighted images, likely artifactual. No definite choledocholithiasis. RUQ ultrasound appreciated CBD 5mm not dilated    EUS +/- ERCP was suggested pending cardiology clearance.  In the ED patient was afebrile, hemodynamically stable pain was completely resolved after morphine. Labs were done significant to transaminitis SGOT/SGPT 3030/ 266 , bilirubin 1.4.  Patient was admitted for further management.    ROS: no SOB, no cough, no chest pain, no urinary symptoms, no change in bowel habits. Patient only complains of bilateral lower extremity claudication with mild exertion.

## 2020-09-25 LAB
ALBUMIN SERPL ELPH-MCNC: 3.8 G/DL — SIGNIFICANT CHANGE UP (ref 3.5–5.2)
ALP SERPL-CCNC: 304 U/L — HIGH (ref 30–115)
ALT FLD-CCNC: 480 U/L — HIGH (ref 0–41)
ANION GAP SERPL CALC-SCNC: 11 MMOL/L — SIGNIFICANT CHANGE UP (ref 7–14)
AST SERPL-CCNC: 331 U/L — HIGH (ref 0–41)
BASOPHILS # BLD AUTO: 0.07 K/UL — SIGNIFICANT CHANGE UP (ref 0–0.2)
BASOPHILS NFR BLD AUTO: 1 % — SIGNIFICANT CHANGE UP (ref 0–1)
BILIRUB DIRECT SERPL-MCNC: 0.5 MG/DL — HIGH (ref 0–0.2)
BILIRUB INDIRECT FLD-MCNC: 1.4 MG/DL — HIGH (ref 0.2–1.2)
BILIRUB SERPL-MCNC: 1.9 MG/DL — HIGH (ref 0.2–1.2)
BILIRUB SERPL-MCNC: 2 MG/DL — HIGH (ref 0.2–1.2)
BUN SERPL-MCNC: 11 MG/DL — SIGNIFICANT CHANGE UP (ref 10–20)
CALCIUM SERPL-MCNC: 9.3 MG/DL — SIGNIFICANT CHANGE UP (ref 8.5–10.1)
CERULOPLASMIN SERPL-MCNC: 23 MG/DL — SIGNIFICANT CHANGE UP (ref 15–30)
CHLORIDE SERPL-SCNC: 106 MMOL/L — SIGNIFICANT CHANGE UP (ref 98–110)
CMV IGM FLD-ACNC: <8 AU/ML — SIGNIFICANT CHANGE UP
CMV IGM SERPL QL: NEGATIVE — SIGNIFICANT CHANGE UP
CO2 SERPL-SCNC: 26 MMOL/L — SIGNIFICANT CHANGE UP (ref 17–32)
CREAT SERPL-MCNC: 1.1 MG/DL — SIGNIFICANT CHANGE UP (ref 0.7–1.5)
EBV EA AB SER IA-ACNC: <5 U/ML — SIGNIFICANT CHANGE UP
EBV EA AB TITR SER IF: POSITIVE
EBV EA IGG SER-ACNC: NEGATIVE — SIGNIFICANT CHANGE UP
EBV NA IGG SER IA-ACNC: 407 U/ML — HIGH
EBV PATRN SPEC IB-IMP: SIGNIFICANT CHANGE UP
EBV VCA IGG AVIDITY SER QL IA: POSITIVE
EBV VCA IGM SER IA-ACNC: 282 U/ML — HIGH
EBV VCA IGM SER IA-ACNC: <10 U/ML — SIGNIFICANT CHANGE UP
EBV VCA IGM TITR FLD: NEGATIVE — SIGNIFICANT CHANGE UP
EOSINOPHIL # BLD AUTO: 0.49 K/UL — SIGNIFICANT CHANGE UP (ref 0–0.7)
EOSINOPHIL NFR BLD AUTO: 7.1 % — SIGNIFICANT CHANGE UP (ref 0–8)
FERRITIN SERPL-MCNC: 161 NG/ML — SIGNIFICANT CHANGE UP (ref 30–400)
GLUCOSE BLDC GLUCOMTR-MCNC: 105 MG/DL — HIGH (ref 70–99)
GLUCOSE BLDC GLUCOMTR-MCNC: 151 MG/DL — HIGH (ref 70–99)
GLUCOSE BLDC GLUCOMTR-MCNC: 82 MG/DL — SIGNIFICANT CHANGE UP (ref 70–99)
GLUCOSE BLDC GLUCOMTR-MCNC: 92 MG/DL — SIGNIFICANT CHANGE UP (ref 70–99)
GLUCOSE SERPL-MCNC: 92 MG/DL — SIGNIFICANT CHANGE UP (ref 70–99)
HAV IGM SER-ACNC: SIGNIFICANT CHANGE UP
HBV SURFACE AG SER-ACNC: SIGNIFICANT CHANGE UP
HCT VFR BLD CALC: 41 % — LOW (ref 42–52)
HGB BLD-MCNC: 12.3 G/DL — LOW (ref 14–18)
IMM GRANULOCYTES NFR BLD AUTO: 0.3 % — SIGNIFICANT CHANGE UP (ref 0.1–0.3)
LYMPHOCYTES # BLD AUTO: 1.4 K/UL — SIGNIFICANT CHANGE UP (ref 1.2–3.4)
LYMPHOCYTES # BLD AUTO: 20.1 % — LOW (ref 20.5–51.1)
MAGNESIUM SERPL-MCNC: 2.1 MG/DL — SIGNIFICANT CHANGE UP (ref 1.8–2.4)
MCHC RBC-ENTMCNC: 26.1 PG — LOW (ref 27–31)
MCHC RBC-ENTMCNC: 30 G/DL — LOW (ref 32–37)
MCV RBC AUTO: 86.9 FL — SIGNIFICANT CHANGE UP (ref 80–94)
MONOCYTES # BLD AUTO: 0.76 K/UL — HIGH (ref 0.1–0.6)
MONOCYTES NFR BLD AUTO: 10.9 % — HIGH (ref 1.7–9.3)
NEUTROPHILS # BLD AUTO: 4.21 K/UL — SIGNIFICANT CHANGE UP (ref 1.4–6.5)
NEUTROPHILS NFR BLD AUTO: 60.6 % — SIGNIFICANT CHANGE UP (ref 42.2–75.2)
NRBC # BLD: 0 /100 WBCS — SIGNIFICANT CHANGE UP (ref 0–0)
PLATELET # BLD AUTO: 320 K/UL — SIGNIFICANT CHANGE UP (ref 130–400)
POTASSIUM SERPL-MCNC: 4.6 MMOL/L — SIGNIFICANT CHANGE UP (ref 3.5–5)
POTASSIUM SERPL-SCNC: 4.6 MMOL/L — SIGNIFICANT CHANGE UP (ref 3.5–5)
PROT SERPL-MCNC: 6.3 G/DL — SIGNIFICANT CHANGE UP (ref 6–8)
RBC # BLD: 4.72 M/UL — SIGNIFICANT CHANGE UP (ref 4.7–6.1)
RBC # FLD: 14.1 % — SIGNIFICANT CHANGE UP (ref 11.5–14.5)
SARS-COV-2 RNA SPEC QL NAA+PROBE: SIGNIFICANT CHANGE UP
SODIUM SERPL-SCNC: 143 MMOL/L — SIGNIFICANT CHANGE UP (ref 135–146)
WBC # BLD: 6.95 K/UL — SIGNIFICANT CHANGE UP (ref 4.8–10.8)
WBC # FLD AUTO: 6.95 K/UL — SIGNIFICANT CHANGE UP (ref 4.8–10.8)

## 2020-09-25 PROCEDURE — 74181 MRI ABDOMEN W/O CONTRAST: CPT | Mod: 26

## 2020-09-25 RX ORDER — INFLUENZA VIRUS VACCINE 15; 15; 15; 15 UG/.5ML; UG/.5ML; UG/.5ML; UG/.5ML
0.5 SUSPENSION INTRAMUSCULAR ONCE
Refills: 0 | Status: DISCONTINUED | OUTPATIENT
Start: 2020-09-25 | End: 2020-09-27

## 2020-09-25 RX ADMIN — PANTOPRAZOLE SODIUM 40 MILLIGRAM(S): 20 TABLET, DELAYED RELEASE ORAL at 11:02

## 2020-09-25 RX ADMIN — LOSARTAN POTASSIUM 50 MILLIGRAM(S): 100 TABLET, FILM COATED ORAL at 05:39

## 2020-09-25 RX ADMIN — SODIUM CHLORIDE 75 MILLILITER(S): 9 INJECTION, SOLUTION INTRAVENOUS at 12:18

## 2020-09-25 RX ADMIN — TICAGRELOR 90 MILLIGRAM(S): 90 TABLET ORAL at 17:17

## 2020-09-25 RX ADMIN — TICAGRELOR 90 MILLIGRAM(S): 90 TABLET ORAL at 05:39

## 2020-09-25 RX ADMIN — SODIUM CHLORIDE 75 MILLILITER(S): 9 INJECTION, SOLUTION INTRAVENOUS at 05:40

## 2020-09-25 RX ADMIN — Medication 1 MILLIGRAM(S): at 09:27

## 2020-09-25 RX ADMIN — Medication 10 MILLIGRAM(S): at 11:02

## 2020-09-25 RX ADMIN — ENOXAPARIN SODIUM 40 MILLIGRAM(S): 100 INJECTION SUBCUTANEOUS at 11:02

## 2020-09-25 RX ADMIN — Medication 25 MILLIGRAM(S): at 17:18

## 2020-09-25 RX ADMIN — Medication 81 MILLIGRAM(S): at 11:02

## 2020-09-25 NOTE — PROGRESS NOTE ADULT - ASSESSMENT
59 yo man with CAD s/p recent stent on brilinta- with acute on chronic abd pain and recent transaminitis- admitted by Dr Giordano of GI for workup  #RUQ and epigastric pain  -Similar episode 10 days ago  -Transaminitis noted on labs could be secondary to hepatic steatosis   -MRCP 9/13 with Punctate hypointensity within CBD on T2-weighted images, likely artifactual. No definite choledocholithiasis.  -US Abdomen Limited (09.12.20 @ 09:34) >Cholelithiasis and sludge without sonographic evidence of acute cholecystitis. No biliary ductal dilatation and Hepatic steatosis.  -US Abdomen Limited (09.24.20 @ 06:48) >  Cholelithiasis. No pericholecystic fluid and a negative Sheth's sign. If there is continued clinical concern for acute cholecystitis, a HIDA scan may be obtained for further evaluation.  -Hep C Ab non reactive  -will check Hepatitis B Sag, Hep B SAB, Hep A Ab, Smooth Muscle Antibody,  AMA, ERWIN, Ceruloplasmin, Ferritin, Alpha-1-antitrypsin, CMV, Herpes serologies, EBV serologies, as ordered during last admission but not done  -Avoid hepatotoxic medications  -Patient might need ERCP /EUS and repeat MRCP   -mrcp will be repeated today     #CAD s/p CABG and recent STEMI s/p PCI  -Continue DAPT (ASA, Brilinta)  -Continue betablocker (metoprolol) and ARB ( losartan)  -Transthoracic Echocardiogram (08.17.20 @ 09:51) >Left ventricular ejection fraction, by visual estimation, is 55 to 60%  -If ERCP is planned-->consult cardiology for clearance since patient is on DAPT (Dr. clancy)    #GERD  -Patient currently symptomatic  -Start Protonix 40 mg daily    #DM  -On Janumet 50/500 po BID  -Patient only takes it OD  -Last A1c IN 8/17  WAS 6.5  -Patient in NPO currently and blood sugar controlled  -Keep -180  -If >180 start insulin  withold po medication     # Pancreatic body 0.9 cm cyst, likely side branch IPMN  mrcp will be repeated    #DVT prophylaxis Lovenox  #Diet: NPO  #Activity: as tolerated  #PPI Protonix

## 2020-09-25 NOTE — CONSULT NOTE ADULT - ASSESSMENT
60 y.o male patient with PMH of CAD s/p CABG in 2008 (LIMA to LAD), DM, HTN, HLD, GERD, hiatal hernia, recent admission in August for STEMI s/p PCI to LCx and LM, presented to the hospital for RUQ and epigastric pain that started yesterday after a heavy meal.   Patient compliant with DAPT and other meds. Good exercise tolerance post-PCI. Able to perform > 4 METs without chest pain or MURRIETA.     CAD s/p CABG and PCI (Aug 17th 2020)  DM   HTN  GERD    Recommendations:   Patient with recent STEMI s/p PCI, recommend postponing non-urgent surgeries for another 2 months (3-6 months post PCI)  Will need to be on DAPT for at least 3-6 months   > 4 METs, moderate risk for jose-op MACE for a low risk procedure (ERCP), given GI no issues doing it on DAPT 60 y.o male patient with PMH of CAD s/p CABG in 2008 (LIMA to LAD), DM, HTN, HLD, GERD, hiatal hernia, recent admission in August for STEMI s/p PCI to LCx and LM, presented to the hospital for RUQ and epigastric pain that started yesterday after a heavy meal.   Patient compliant with DAPT and other meds. Good exercise tolerance post-PCI. Able to perform > 4 METs without chest pain or MURRIETA.     CAD s/p CABG and PCI (Aug 16th 2020)  DM   HTN  GERD    Recommendations:   Patient with recent STEMI s/p PCI, recommend postponing non-urgent surgeries for another 2 months (3-6 months post PCI)  Will need to be on uninterrupted DAPT (ASA and Brilinta) for at least 3-6 months from the date of IVONE implantation (Aug 16th 2020)  Consider reducing Lipitor to 40 mg daily   > 4 METs, moderate risk for jose-op MACE for a low risk procedure (ERCP), given GI have no issues doing it on DAPT 60 y.o male patient with PMH of CAD s/p CABG in 2008 (LIMA to LAD), DM, HTN, HLD, GERD, hiatal hernia, recent admission in August for STEMI s/p PCI to LCx and LM, presented to the hospital for RUQ and epigastric pain that started yesterday after a heavy meal.   Patient compliant with DAPT and other meds. Good exercise tolerance post-PCI. Able to perform > 4 METs without chest pain or MURRIETA.     CAD s/p CABG and PCI (Aug 16th 2020)  DM   HTN  GERD    Recommendations:   Patient with recent STEMI s/p PCI, recommend postponing non-urgent surgeries for another 2 months (3-6 months post PCI)  Uninterrupted DAPT (ASA and Brilinta) is recommended for 12 month from the date of acute MI  (Aug 16th 2020)  Consider reducing Lipitor to 40 mg daily   > 4 METs, moderate risk for jose-op MACE for a low risk procedure (ERCP), given GI have no issues doing it on DAPT

## 2020-09-25 NOTE — CONSULT NOTE ADULT - SUBJECTIVE AND OBJECTIVE BOX
Chief Complaint: Patient is a 60y old  Male who presents with a chief complaint of RUQ pain (25 Sep 2020 10:18)      HPI:  has had several attacks of RUQ pain radiating into chest.  Came to Er w/o relief and found to have cholethiasis on sono w/o dilated CBD but elevated lfts.  MRCP was done this AM - study was suboptimal but no definite CBD were seen    Medications:  aluminum hydroxide/magnesium hydroxide/simethicone Suspension 30 milliLiter(s) Oral every 4 hours PRN  aspirin enteric coated 81 milliGRAM(s) Oral daily  atorvastatin 40 milliGRAM(s) Oral at bedtime  enoxaparin Injectable 40 milliGRAM(s) SubCutaneous daily  influenza   Vaccine 0.5 milliLiter(s) IntraMuscular once  losartan 50 milliGRAM(s) Oral daily  metoprolol tartrate 25 milliGRAM(s) Oral two times a day  ondansetron Injectable 4 milliGRAM(s) IV Push every 6 hours PRN  pantoprazole  Injectable 40 milliGRAM(s) IV Push daily  PARoxetine 10 milliGRAM(s) Oral daily  sodium chloride 0.45%. 1000 milliLiter(s) IV Continuous <Continuous>  ticagrelor 90 milliGRAM(s) Oral every 12 hours      PMHX/PSHX:  Hiatal hernia with GERD    DM (diabetes mellitus)    CAD S/P percutaneous coronary angioplasty    Heart attack    Ulcer of esophagus    Chronic gastric ulcer without hemorrhage and without perforation    High cholesterol    GERD (gastroesophageal reflux disease)    Cardiac abnormality    Chronic obstructive pulmonary disease    H/O right coronary artery stent placement    Cardiac arrhythmia    Coronary artery disease involving other coronary artery bypass graft, angina presence unspecified        Family history:  FH: CAD (coronary artery disease) (Grandparent)    No pertinent family history in first degree relatives        Social History:     Allergies:  No Known Allergies        Review of Systems:  General:  No wt loss, fevers, chills, night sweats, fatigue or pruritis.  Eyes:  Good vision, no reported pain or redness.  ENT:  No sore throat, pain, runny nose, or difficulty swallowing  CV:  No pain, palpitations, hypo/hypertension  Resp:  No dyspnea, cough, tachypnea, wheezing  GI:  No pain, nausea, vomiting, dysphagia, heartburn, diarrhea, constipation, or weight loss. , No rectal bleeding, tarry stools, or hematemesis.  :  No pain, bleeding/discharges, incontinence, nocturia  Musculoskeletal:  No pain, weakness or fasciculations.  Neuro:  No weakness, tingling, memory problems or paresthesias  Psych:  No fatigue, insomnia, mood problems, depression  Endocrine:  No polyuria, polydipsia, cold/heat intolerance  Heme:  No petechiae, ecchymosis, easy bruisability  Skin:  No rash, pruritis, tattoos, scars, or edema      PHYSICAL EXAM:   Vital Signs:  Vital Signs Last 24 Hrs  T(C): 35.6 (25 Sep 2020 07:30), Max: 36.8 (25 Sep 2020 02:42)  T(F): 96.1 (25 Sep 2020 07:30), Max: 98.2 (25 Sep 2020 02:42)  HR: 53 (25 Sep 2020 07:30) (53 - 58)  BP: 127/60 (25 Sep 2020 07:30) (106/51 - 127/60)  BP(mean): --  RR: 18 (25 Sep 2020 07:30) (18 - 18)  SpO2: 97% (25 Sep 2020 02:42) (97% - 99%)  Daily     Daily     T(C): 35.6 (09-25-20 @ 07:30), Max: 36.8 (09-25-20 @ 02:42)  HR: 53 (09-25-20 @ 07:30) (53 - 58)  BP: 127/60 (09-25-20 @ 07:30) (106/51 - 127/60)  RR: 18 (09-25-20 @ 07:30) (18 - 18)  SpO2: 97% (09-25-20 @ 02:42) (97% - 99%)    GENERAL:  Appears stated age, well-groomed, well-nourished, no distress  HEENT:  Conjunctivae clear and pink, no thyromegaly, nodules, adenopathy, no JVD, sclera -anicteric  CHEST:  Full & symmetric excursion, no increased effort, breath sounds clear  HEART:  Regular rhythm, S1, S2, no murmur/rub/S3/S4, no abdominal bruit, no edema  ABDOMEN:  Soft, non-tender, non-distended, normoactive bowel sounds,  no masses ,no hepato-splenomegaly, no signs of chronic liver disease  EXTEREMITIES:  no cyanosis,clubbing or edema  SKIN:  No rash/erythema/ecchymoses/petechiae/wounds/abscess/warm/dry  NEURO:  Alert, oriented, no asterixis, no tremor, no encephalopathy    LABS:                        12.3   6.95  )-----------( 320      ( 25 Sep 2020 06:06 )             41.0     09-25    143  |  106  |  11  ----------------------------<  92  4.6   |  26  |  1.1    Ca    9.3      25 Sep 2020 06:06  Mg     2.1     09-25    TPro  6.3  /  Alb  3.8  /  TBili  2.0<H>  /  DBili  0.5<H>  /  AST  331<H>  /  ALT  480<H>  /  AlkPhos  304<H>  09-25    LIVER FUNCTIONS - ( 25 Sep 2020 06:06 )  Alb: 3.8 g/dL / Pro: 6.3 g/dL / ALK PHOS: 304 U/L / ALT: 480 U/L / AST: 331 U/L / GGT: x                   Imaging: see above      Assessment and Plan:  ASS:   Cholelithiasis with cholecystitis but no evidence of  choledocolithiasis.  Increased LFTs likely due to cholecystitis and s/p passage of cbd stone      REC:  Surgical consult    
Date of Admission: 9/25/2020    CHIEF COMPLAINT:    HISTORY OF PRESENT ILLNESS: 60 y.o male patient with PMH of CAD s/p CABG in 2008 (LIMA to LAD), DM, HTN, HLD, GERD, hiatal hernia, recent admission in August for STEMI s/p PCI to LCx and LM, presented to the hospital for RUQ and epigastric pain that started yesterday after a heavy meal. According to the patient the pain is severe in intensity ,dull in nature, non radiating, associated with nausea  and non bilous, non bloody vomiting. Pain typically lasts for 1 hour then comes back.  No fever, no chills, no other complains.  To note that the patient had similar complains during last admission 10 days ago at the [facility]. Patient was founfd to have Transaminitis and cholestatic pattern. MRCP done last time showed Punctate hypointensities within CBD on T2-weighted images, likely artifactual. No definite choledocholithiasis. RUQ ultrasound appreciated CBD 5mm not dilated    EUS +/- ERCP was suggested pending cardiology clearance.  In the ED patient was afebrile, hemodynamically stable pain was completely resolved after morphine. Labs were done significant to transaminitis SGOT/SGPT 3030/ 266 , bilirubin 1.4.  Patient was admitted for further management.    ROS: no SOB, no cough, no chest pain, no urinary symptoms, no change in bowel habits. Patient only complains of bilateral lower extremity claudication with mild exertion.    Cardiology:  60 y.o male patient with PMH of CAD s/p CABG in 2008 (LIMA to LAD), DM, HTN, HLD, GERD, hiatal hernia, recent admission in August for STEMI s/p PCI to LCx and LM, presented to the hospital for RUQ and epigastric pain that started yesterday after a heavy meal.   Patient compliant with DAPT and other meds. Good exercise tolerance post-PCI. Able to perform > 4 METs without chest pain or MURRIETA.     PAST MEDICAL & SURGICAL HISTORY:  Hiatal hernia with GERD    DM (diabetes mellitus)    CAD S/P percutaneous coronary angioplasty    Ulcer of esophagus    Chronic gastric ulcer without hemorrhage and without perforation    High cholesterol    GERD (gastroesophageal reflux disease)    H/O right coronary artery stent placement  August 17, 2020    Cardiac arrhythmia  bypass surgery 9 years ago    Coronary artery disease involving other coronary artery bypass graft, angina presence unspecified        FAMILY HISTORY:  [x] no pertinent family history of premature cardiovascular disease in first degree relatives.  Mother:   Father:   Siblings:     SOCIAL HISTORY:    [x] Non-smoker. Former smoker quit ~15 years ago.   [ ] Smoker  [ ] Alcohol    Allergies    No Known Allergies    Intolerances    	    REVIEW OF SYSTEMS:  CONSTITUTIONAL: denies fever, weight loss, or fatigue  CARDIOLOGY: denies chest pain, shortness of breath or syncopal episodes.   RESPIRATORY: denies shortness of breath, wheezing.   NEUROLOGICAL: denies weakness, no focal deficits to report.  ENDOCRINOLOGICAL: no recent change in diabetic medications.   GI: see HPI  PSYCHIATRY: normal mood and affect  HEENT: no nasal discharge, no ecchymosis  SKIN: no ecchymosis, no breakdown  MUSCULOSKELETAL: Full range of motion x4.     PHYSICAL EXAM:  T(C): 35.6 (09-25-20 @ 07:30), Max: 36.8 (09-25-20 @ 02:42)  HR: 53 (09-25-20 @ 07:30) (53 - 58)  BP: 127/60 (09-25-20 @ 07:30) (106/51 - 127/60)  RR: 18 (09-25-20 @ 07:30) (18 - 18)  SpO2: 97% (09-25-20 @ 02:42) (97% - 99%)  Wt(kg): --  I&O's Summary      General Appearance: well appearing, normal for age and gender. 	  Neck: normal JVP, no bruit.   Eyes: No xanthomalasia, Extra Ocular muscles intact.   Cardiovascular: regular rate and rhythm S1 S2, No JVD, No murmurs, No edema  Respiratory: Lungs clear to auscultation	  Psychiatry: Alert and oriented x 3, Mood & affect appropriate  Gastrointestinal:  Soft, Non-tender  Skin/Integumen: No rashes, No ecchymoses, No cyanosis	  Neurologic: Non-focal  Musculoskeletal/extremities: Normal range of motion, No clubbing, cyanosis or edema  Vascular: Peripheral pulses palpable 2+ bilaterally    LABS:	 	                          12.3   6.95  )-----------( 320      ( 25 Sep 2020 06:06 )             41.0     09-25    143  |  106  |  11  ----------------------------<  92  4.6   |  26  |  1.1    Ca    9.3      25 Sep 2020 06:06  Mg     2.1     09-25    TPro  6.3  /  Alb  3.8  /  TBili  2.0<H>  /  DBili  0.5<H>  /  AST  331<H>  /  ALT  480<H>  /  AlkPhos  304<H>  09-25    CARDIAC MARKERS ( 24 Sep 2020 04:04 )  x     / <0.01 ng/mL / x     / x     / x                    TELEMETRY EVENTS: 	  not on telemetry  ECG:  	Sinus rhythm old inferior and lateral q-waves  RADIOLOGY:   < from: MR MRCP No Cont (09.25.20 @ 10:44) >  IMPRESSION:    1. Since September 13, 2020, overall unchanged exam, with minimally dilated common bile duct, measuring 8 mm, and no evidence of choledocholithiasis.    2. Cholelithiasis.    < end of copied text >  OTHER: 	    PREVIOUS DIAGNOSTIC TESTING:    [ ] Echocardiogram:    < from: Transthoracic Echocardiogram (08.17.20 @ 09:51) >  Summary:   1. Left ventricular ejection fraction, by visual estimation, is 55 to 60%.   2. Normal left ventricular size and wall thicknesses, with normal systolic and diastolic function.   3. Mild mitral valve regurgitation.   4. LA volume Index is 29.4 ml/m² ml/m2.    < end of copied text >  [ ] Catheterization:    FINDINGS    Hemodynamics: Hemodynamic assessment demonstrates moderately elevated LVEDP.     Coronary circulation: The coronary circulation is right dominant. There was significant left main disease. There was significant 3-vessel coronary artery disease (LAD, RCA, and circumflex).     Left main: The vessel was medium to large sized. There was a discrete 80 % stenosis at a site with no prior intervention, at the ostium of the vessel segment. There was KYA grade 3 flow through the vessel (brisk flow). An intervention was performed.     LAD: The vessel was medium sized. Proximal LAD: Angiography showed severe atherosclerosis. There was a discrete 90 % stenosis at the ostium of the vessel segment. In a second lesion, there was a tubular 90 % stenosis. Mid LAD: There was a 100 % stenosis. This lesion is a chronic total occlusion. The artery was supplied by retrograde flow from a graft. Distal LAD: Angiography showed mild atherosclerosis with no flow limiting lesions. The artery was supplied by a patent bypass graft. 1st diagonal: The vessel was small sized. Angiography showed mild atherosclerosis with no flow limiting lesions.     Circumflex: The vessel was medium sized. Proximal circumflex: Angiography showed moderate atherosclerosis. There was a 100 % stenosis at a site with no prior intervention, just before OM1. The lesion was associated with a large filling defect consistent with thrombus. There was KYA grade 0 flow through the vessel (no flow). This is a likely culprit for the patient's clinical presentation. An intervention was performed. Distal circumflex: The vessel was small sized. Angiography showed moderate atherosclerosis. 1st obtuse marginal: The vessel was small sized. Angiography showed mild atherosclerosis with no flow limiting lesions. 2nd obtuse marginal: The vessel was medium to large sized. There was a tubular 50 % stenosis at the ostium of the vessel segment.     RCA: The vessel was medium sized. Proximal RCA: There was a tubular 50 % stenosis. Mid RCA: Angiography showed severe atherosclerosis. There was a diffuse 70- 80 % stenosis. Distal RCA: Angiography showed severe atherosclerosis. There was a 100 % stenosis. This lesion is a chronic total occlusion. Right PDA: The artery was supplied by collaterals from left. Graft to the distal LAD: The graft was a medium sized LIMA. Graft angiography showed no evidence of disease.       INTERVENTION/ IMPLANTS: IVONE x 1 to prox LCX. cutting balloon angioplasty and IVONE x 1 to LM. [ ] Stress Test:  	  	    Home Medications:  Janumet 50 mg-500 mg oral tablet: 1 tab(s) orally 2 times a day (16 Aug 2020 16:34)    MEDICATIONS  (STANDING):  aspirin enteric coated 81 milliGRAM(s) Oral daily  atorvastatin 40 milliGRAM(s) Oral at bedtime  enoxaparin Injectable 40 milliGRAM(s) SubCutaneous daily  influenza   Vaccine 0.5 milliLiter(s) IntraMuscular once  losartan 50 milliGRAM(s) Oral daily  metoprolol tartrate 25 milliGRAM(s) Oral two times a day  pantoprazole  Injectable 40 milliGRAM(s) IV Push daily  PARoxetine 10 milliGRAM(s) Oral daily  sodium chloride 0.45%. 1000 milliLiter(s) (75 mL/Hr) IV Continuous <Continuous>  ticagrelor 90 milliGRAM(s) Oral every 12 hours    MEDICATIONS  (PRN):  aluminum hydroxide/magnesium hydroxide/simethicone Suspension 30 milliLiter(s) Oral every 4 hours PRN Dyspepsia  ondansetron Injectable 4 milliGRAM(s) IV Push every 6 hours PRN Nausea        
KATH MCCOLLUM [id number]  60y Male  1d    HPI:  60 y.o male patient with PMH of CAD s/p CABG in 2008 (LIMA to LAD), DM, HTN, HLD, GERD, hiatal hernia, recent admission in August for STEMI s/p PCI to LCx and LM, presented to the hospital for RUQ and epigastric pain that started yesterday after a heavy meal. According to the patient the pain is severe in intensity ,dull in nature, non radiating, associated with nausea  and non bilous, non bloody vomiting. Pain typically lasts for 1 hour then comes back.  No fever, no chills, no other complains.  To note that the patient had similar complains during last admission 10 days ago at the [facility]. Patient was founfd to have Transaminitis and cholestatic pattern. MRCP done last time showed Punctate hypointensities within CBD on T2-weighted images, likely artifactual. No definite choledocholithiasis. RUQ ultrasound appreciated CBD 5mm not dilated    EUS +/- ERCP was suggested pending cardiology clearance.  In the ED patient was afebrile, hemodynamically stable pain was completely resolved after morphine. Labs were done significant to transaminitis SGOT/SGPT 3030/ 266 , bilirubin 1.4.  Patient was admitted for further management.    ROS: no SOB, no cough, no chest pain, no urinary symptoms, no change in bowel habits. Patient only complains of bilateral lower extremity claudication with mild exertion. (24 Sep 2020 08:19)      Surgery is consulted for cholecystectomy. On exam, pt is non-TTP. Says pain has resolved since admission. No nausea, vomiting, fever, chills, change in bowel habits. Non-TTP on exam. Tolerating diet.    PAST MEDICAL & SURGICAL HISTORY:  Hiatal hernia with GERD  DM (diabetes mellitus)  CAD S/P percutaneous coronary angioplasty  Ulcer of esophagus  Chronic gastric ulcer without hemorrhage and without perforation  High cholesterol  GERD (gastroesophageal reflux disease)  H/O right coronary artery stent placement  August 17, 2020  Cardiac arrhythmia  bypass surgery 9 years ago  Coronary artery disease involving other coronary artery bypass graft, angina presence unspecified    MEDICATIONS  (STANDING):  aspirin enteric coated 81 milliGRAM(s) Oral daily  enoxaparin Injectable 40 milliGRAM(s) SubCutaneous daily  influenza   Vaccine 0.5 milliLiter(s) IntraMuscular once  losartan 50 milliGRAM(s) Oral daily  metoprolol tartrate 25 milliGRAM(s) Oral two times a day  pantoprazole  Injectable 40 milliGRAM(s) IV Push daily  PARoxetine 10 milliGRAM(s) Oral daily  sodium chloride 0.45%. 1000 milliLiter(s) (75 mL/Hr) IV Continuous <Continuous>  ticagrelor 90 milliGRAM(s) Oral every 12 hours    MEDICATIONS  (PRN):  aluminum hydroxide/magnesium hydroxide/simethicone Suspension 30 milliLiter(s) Oral every 4 hours PRN Dyspepsia  ondansetron Injectable 4 milliGRAM(s) IV Push every 6 hours PRN Nausea    Allergies  No Known Allergies    REVIEW OF SYSTEMS  [X] A ten-point review of systems was otherwise negative except as noted.  [ ] Due to altered mental status/intubation, subjective information were not able to be obtained from the patient. History was obtained, to the extent possible, from review of the chart and collateral sources of information.    Vital Signs Last 24 Hrs  T(C): 35.8 (25 Sep 2020 15:30), Max: 36.8 (25 Sep 2020 02:42)  T(F): 96.5 (25 Sep 2020 15:30), Max: 98.2 (25 Sep 2020 02:42)  HR: 75 (25 Sep 2020 15:30) (53 - 75)  BP: 132/70 (25 Sep 2020 15:30) (106/51 - 132/70)  RR: 18 (25 Sep 2020 15:30) (18 - 18)  SpO2: 97% (25 Sep 2020 02:42) (97% - 97%)      PHYSICAL EXAM:  GENERAL: NAD, well-appearing  CHEST/LUNG: Clear to auscultation bilaterally  HEART: Regular rate and rhythm  ABDOMEN: Soft, Nontender, Nondistended;   EXTREMITIES:  No clubbing, cyanosis, or edema      LABS:  POCT Blood Glucose.: 105 mg/dL (25 Sep 2020 16:33)  POCT Blood Glucose.: 92 mg/dL (25 Sep 2020 11:05)  POCT Blood Glucose.: 82 mg/dL (25 Sep 2020 08:02)                 12.3   6.95  )-----------( 320      ( 25 Sep 2020 06:06 )             41.0       Auto Neutrophil %: 60.6 % (09-25-20 @ 06:06)  Auto Immature Granulocyte %: 0.3 % (09-25-20 @ 06:06)    09-25    143  |  106  |  11  ----------------------------<  92  4.6   |  26  |  1.1    Calcium, Total Serum: 9.3 mg/dL (09-25-20 @ 06:06)    LFTs:             6.3  | 2.0  | 331      ------------------[304     ( 25 Sep 2020 06:06 )  3.8  | 0.5  | 480         Lactate, Blood: 1.2 mmol/L (09-24-20 @ 04:04)    CARDIAC MARKERS ( 24 Sep 2020 04:04 )  x     / <0.01 ng/mL / x     / x     / x          RADIOLOGY & ADDITIONAL STUDIES:    < from: MR MRCP No Cont (09.25.20 @ 10:44) >  FINDINGS:    HEPATOBILIARY:  Mild diffuse hepatic steatosis. 1.3 cm left hepatic lobe cyst. Distended gallbladder with cholelithiasis. No gallbladder wall thickening or pericholecystic fluid. No intrahepatic biliary ductal dilatation. Common bile duct is mildly dilated, measuring 8 mm, without evidence of choledocholithiasis.    PANCREAS:  Stable pancreatic body 0.9 cm cystic lesion.    ADRENAL GLANDS:  Stable left adrenal gland 1.1 cm Nodule which demonstrates imaging features compatible with adenoma. Unremarkable right adrenal gland.    KIDNEYS:  Subcentimeter right renal upper pole cyst. No hydronephrosis.    ABDOMINAL NODES:  No adenopathy.      IMPRESSION:  1. Since September 13, 2020, overall unchanged exam, with minimally dilated common bile duct, measuring 8 mm, and no evidence of choledocholithiasis.  2. Cholelithiasis.  < end of copied text >      `< from: US Abdomen Limited (09.24.20 @ 06:48) >  FINDINGS:    Liver: Within normal limits.  Bile ducts: No biliary ductal dilatation. Common bile duct measures 4 mm.  Gallbladder: Cholelithiasis. Minimal wall thickening measuring less than 3 mm. No pericholecystic fluid. Negative sonographic Sheth's sign.  Pancreas: Visualized portions are within normal limits.  Right kidney: Measures 10.3 cm in length. No hydronephrosis.  Ascites: None.  IVC: Visualized portions are within normal limits.    IMPRESSION:  Cholelithiasis. No pericholecystic fluid and a negative Sheth's sign. If there is continued clinical concern for acute cholecystitis, a HIDA scan may be obtained for further evaluation.  < end of copied text >      < from: CT Angio Chest Dissection Protocol (09.11.20 @ 22:25) >    HEPATIC: The liver is normal in appearance with no evidence of solid mass or bile duct dilatation. The 1.4 cm cyst in the left lobe of the liver is again noted. The portal vein is patent. The hepatic veins are opacified.    BILIARY:Cholelithiasis.      IMPRESSION:  No evidence of aortic aneurysm or dissection.  Cholelithiasis.    < end of copied text >

## 2020-09-25 NOTE — CONSULT NOTE ADULT - ASSESSMENT
60M w/ cholelithiasis and recent STEMI in August 2020 s/p PCI on DAPT. Surgery is consulted for cholecystectomy.     PLAN:   - no acute surgical intervention, especially i/s/o recent STEMI and cardiology recommendations to postpone non-urgent surgeries for 2 months  - increasing LFTs -- recommend ERCP  - d/w Dr. Em

## 2020-09-26 LAB
ALBUMIN SERPL ELPH-MCNC: 3.5 G/DL — SIGNIFICANT CHANGE UP (ref 3.5–5.2)
ALP SERPL-CCNC: 290 U/L — HIGH (ref 30–115)
ALT FLD-CCNC: 364 U/L — HIGH (ref 0–41)
ANA TITR SER: NEGATIVE — SIGNIFICANT CHANGE UP
ANION GAP SERPL CALC-SCNC: 10 MMOL/L — SIGNIFICANT CHANGE UP (ref 7–14)
AST SERPL-CCNC: 181 U/L — HIGH (ref 0–41)
BASOPHILS # BLD AUTO: 0.07 K/UL — SIGNIFICANT CHANGE UP (ref 0–0.2)
BASOPHILS NFR BLD AUTO: 1 % — SIGNIFICANT CHANGE UP (ref 0–1)
BILIRUB SERPL-MCNC: 1.3 MG/DL — HIGH (ref 0.2–1.2)
BUN SERPL-MCNC: 9 MG/DL — LOW (ref 10–20)
CALCIUM SERPL-MCNC: 9.1 MG/DL — SIGNIFICANT CHANGE UP (ref 8.5–10.1)
CHLORIDE SERPL-SCNC: 105 MMOL/L — SIGNIFICANT CHANGE UP (ref 98–110)
CO2 SERPL-SCNC: 26 MMOL/L — SIGNIFICANT CHANGE UP (ref 17–32)
CREAT SERPL-MCNC: 1.1 MG/DL — SIGNIFICANT CHANGE UP (ref 0.7–1.5)
EOSINOPHIL # BLD AUTO: 0.51 K/UL — SIGNIFICANT CHANGE UP (ref 0–0.7)
EOSINOPHIL NFR BLD AUTO: 7.2 % — SIGNIFICANT CHANGE UP (ref 0–8)
GLUCOSE BLDC GLUCOMTR-MCNC: 101 MG/DL — HIGH (ref 70–99)
GLUCOSE BLDC GLUCOMTR-MCNC: 114 MG/DL — HIGH (ref 70–99)
GLUCOSE BLDC GLUCOMTR-MCNC: 88 MG/DL — SIGNIFICANT CHANGE UP (ref 70–99)
GLUCOSE BLDC GLUCOMTR-MCNC: 92 MG/DL — SIGNIFICANT CHANGE UP (ref 70–99)
GLUCOSE SERPL-MCNC: 93 MG/DL — SIGNIFICANT CHANGE UP (ref 70–99)
HBV SURFACE AB SER-ACNC: SIGNIFICANT CHANGE UP
HCT VFR BLD CALC: 40.3 % — LOW (ref 42–52)
HGB BLD-MCNC: 12.3 G/DL — LOW (ref 14–18)
IMM GRANULOCYTES NFR BLD AUTO: 0.3 % — SIGNIFICANT CHANGE UP (ref 0.1–0.3)
LYMPHOCYTES # BLD AUTO: 1.88 K/UL — SIGNIFICANT CHANGE UP (ref 1.2–3.4)
LYMPHOCYTES # BLD AUTO: 26.5 % — SIGNIFICANT CHANGE UP (ref 20.5–51.1)
MCHC RBC-ENTMCNC: 26.1 PG — LOW (ref 27–31)
MCHC RBC-ENTMCNC: 30.5 G/DL — LOW (ref 32–37)
MCV RBC AUTO: 85.6 FL — SIGNIFICANT CHANGE UP (ref 80–94)
MONOCYTES # BLD AUTO: 0.86 K/UL — HIGH (ref 0.1–0.6)
MONOCYTES NFR BLD AUTO: 12.1 % — HIGH (ref 1.7–9.3)
NEUTROPHILS # BLD AUTO: 3.76 K/UL — SIGNIFICANT CHANGE UP (ref 1.4–6.5)
NEUTROPHILS NFR BLD AUTO: 52.9 % — SIGNIFICANT CHANGE UP (ref 42.2–75.2)
NRBC # BLD: 0 /100 WBCS — SIGNIFICANT CHANGE UP (ref 0–0)
PLATELET # BLD AUTO: 313 K/UL — SIGNIFICANT CHANGE UP (ref 130–400)
POTASSIUM SERPL-MCNC: 4.3 MMOL/L — SIGNIFICANT CHANGE UP (ref 3.5–5)
POTASSIUM SERPL-SCNC: 4.3 MMOL/L — SIGNIFICANT CHANGE UP (ref 3.5–5)
PROT SERPL-MCNC: 6 G/DL — SIGNIFICANT CHANGE UP (ref 6–8)
RBC # BLD: 4.71 M/UL — SIGNIFICANT CHANGE UP (ref 4.7–6.1)
RBC # FLD: 14 % — SIGNIFICANT CHANGE UP (ref 11.5–14.5)
SODIUM SERPL-SCNC: 141 MMOL/L — SIGNIFICANT CHANGE UP (ref 135–146)
WBC # BLD: 7.1 K/UL — SIGNIFICANT CHANGE UP (ref 4.8–10.8)
WBC # FLD AUTO: 7.1 K/UL — SIGNIFICANT CHANGE UP (ref 4.8–10.8)

## 2020-09-26 PROCEDURE — 99233 SBSQ HOSP IP/OBS HIGH 50: CPT

## 2020-09-26 RX ORDER — ATORVASTATIN CALCIUM 80 MG/1
1 TABLET, FILM COATED ORAL
Qty: 30 | Refills: 0
Start: 2020-09-26 | End: 2020-10-25

## 2020-09-26 RX ORDER — LOSARTAN POTASSIUM 100 MG/1
50 TABLET, FILM COATED ORAL DAILY
Refills: 0 | Status: DISCONTINUED | OUTPATIENT
Start: 2020-09-26 | End: 2020-09-26

## 2020-09-26 RX ORDER — PANTOPRAZOLE SODIUM 20 MG/1
1 TABLET, DELAYED RELEASE ORAL
Qty: 30 | Refills: 0
Start: 2020-09-26 | End: 2020-10-25

## 2020-09-26 RX ORDER — ATORVASTATIN CALCIUM 80 MG/1
40 TABLET, FILM COATED ORAL AT BEDTIME
Refills: 0 | Status: DISCONTINUED | OUTPATIENT
Start: 2020-09-26 | End: 2020-09-27

## 2020-09-26 RX ADMIN — Medication 25 MILLIGRAM(S): at 17:41

## 2020-09-26 RX ADMIN — Medication 10 MILLIGRAM(S): at 11:33

## 2020-09-26 RX ADMIN — SODIUM CHLORIDE 75 MILLILITER(S): 9 INJECTION, SOLUTION INTRAVENOUS at 11:35

## 2020-09-26 RX ADMIN — TICAGRELOR 90 MILLIGRAM(S): 90 TABLET ORAL at 17:41

## 2020-09-26 RX ADMIN — PANTOPRAZOLE SODIUM 40 MILLIGRAM(S): 20 TABLET, DELAYED RELEASE ORAL at 11:33

## 2020-09-26 RX ADMIN — ATORVASTATIN CALCIUM 40 MILLIGRAM(S): 80 TABLET, FILM COATED ORAL at 21:03

## 2020-09-26 RX ADMIN — Medication 25 MILLIGRAM(S): at 05:56

## 2020-09-26 RX ADMIN — LOSARTAN POTASSIUM 50 MILLIGRAM(S): 100 TABLET, FILM COATED ORAL at 05:56

## 2020-09-26 RX ADMIN — Medication 81 MILLIGRAM(S): at 11:32

## 2020-09-26 RX ADMIN — TICAGRELOR 90 MILLIGRAM(S): 90 TABLET ORAL at 05:57

## 2020-09-26 RX ADMIN — ENOXAPARIN SODIUM 40 MILLIGRAM(S): 100 INJECTION SUBCUTANEOUS at 11:32

## 2020-09-26 NOTE — PROGRESS NOTE ADULT - ASSESSMENT
61 yo man with CAD s/p recent stent on brilinta- with acute on chronic abd pain and recent transaminitis- admitted by Dr Giordano of GI for workup  #RUQ and epigastric pain  -Similar episode 10 days ago  -Transaminitis noted on labs could be secondary to hepatic steatosis   -MRCP 9/13 with Punctate hypointensity within CBD on T2-weighted images, likely artifactual. No definite choledocholithiasis.  -US Abdomen Limited (09.12.20 @ 09:34) >Cholelithiasis and sludge without sonographic evidence of acute cholecystitis. No biliary ductal dilatation and Hepatic steatosis.  -US Abdomen Limited (09.24.20 @ 06:48) >  Cholelithiasis. No pericholecystic fluid and a negative Sheth's sign. If there is continued clinical concern for acute cholecystitis, a HIDA scan may be obtained for further evaluation.  -Hep C Ab non reactive  -will check Hepatitis B Sag, Hep B SAB, Hep A Ab, Smooth Muscle Antibody,  AMA, ERWIN, Ceruloplasmin, Ferritin, Alpha-1-antitrypsin, CMV, Herpes serologies, EBV serologies, as ordered during last admission but not done  -Avoid hepatotoxic medications  -Patient might need ERCP /EUS and repeat MRCP   -mrcp will be repeated today     #CAD s/p CABG and recent STEMI s/p PCI  -Continue DAPT (ASA, Brilinta)  -Continue betablocker (metoprolol) and ARB ( losartan)  -Transthoracic Echocardiogram (08.17.20 @ 09:51) >Left ventricular ejection fraction, by visual estimation, is 55 to 60%  -If ERCP is planned-->consult cardiology for clearance since patient is on DAPT (Dr. clancy)    #GERD  -Patient currently symptomatic  -Start Protonix 40 mg daily    #DM  -On Janumet 50/500 po BID  -Patient only takes it OD  -Last A1c IN 8/17  WAS 6.5  -Patient in NPO currently and blood sugar controlled  -Keep -180  -If >180 start insulin  withold po medication     # transaminitis  Pancreatic body 0.9 cm cyst, likely side branch IPMN  mrcp done and showed dilation of the cbd of 8mm  GI recommended Surgical cs  surgery: no intervention at this time and recommended ercp  cardio: no OR till 3-6 months after the pci , the pt is on dapt  moderate risk for ercp  monitor lfts       #DVT prophylaxis Lovenox  #Diet: to be advanced   #Activity: as tolerated  #PPI Protonix

## 2020-09-26 NOTE — PROGRESS NOTE ADULT - ASSESSMENT
60M w/ cholelithiasis and recent STEMI in August 2020 s/p PCI on DAPT.     Plan:    - Pain control as needed  - No Acute Surgical Intervention  - Recommend LFT  - Recommend ERCP  - Encourage ambulation and incentive spirometer use (10x/hr when awake)  - GI and DVT prophylaxis  - Continue current management  - d/w Dr. Em

## 2020-09-26 NOTE — CHART NOTE - NSCHARTNOTEFT_GEN_A_CORE
DR campa was contacted concerning the possibility of ercp as per the surgery.  he will call back , the office was informed to leave a message for the oncall resident in case they respond after my shift

## 2020-09-26 NOTE — PROGRESS NOTE ADULT - ATTENDING COMMENTS
Patient was seen independently. Latest vital signs and labs were reviewed. Case was discussed with housestaff. Agree with resident's assessment and plan with following additions/modifications.       SSESSMENT & PLAN:   Transaminitis secondary to choledocholithiasis( may have passed)  MRCP repeat showing  no evidence of choledocholithiasis but shows  Cholelithiasis  with Minimal dilatation of CBD  LFt's downtredning  Cardiology pending  continue with DAPT  Insulin sliding scale with coverage wih meals and QHS   Electrolyte supplementation and follow up with BMP. Keep K+>4, Mg>2   Advance diet today to solids  If LFT's contionue to downtrend & if ERCP is not warranted then will discvharge in AM

## 2020-09-27 ENCOUNTER — TRANSCRIPTION ENCOUNTER (OUTPATIENT)
Age: 60
End: 2020-09-27

## 2020-09-27 VITALS
TEMPERATURE: 98 F | DIASTOLIC BLOOD PRESSURE: 67 MMHG | RESPIRATION RATE: 18 BRPM | HEART RATE: 52 BPM | SYSTOLIC BLOOD PRESSURE: 123 MMHG

## 2020-09-27 LAB
ALBUMIN SERPL ELPH-MCNC: 3.5 G/DL — SIGNIFICANT CHANGE UP (ref 3.5–5.2)
ALP SERPL-CCNC: 253 U/L — HIGH (ref 30–115)
ALT FLD-CCNC: 246 U/L — HIGH (ref 0–41)
ANION GAP SERPL CALC-SCNC: 7 MMOL/L — SIGNIFICANT CHANGE UP (ref 7–14)
AST SERPL-CCNC: 82 U/L — HIGH (ref 0–41)
BILIRUB SERPL-MCNC: 0.9 MG/DL — SIGNIFICANT CHANGE UP (ref 0.2–1.2)
BUN SERPL-MCNC: 10 MG/DL — SIGNIFICANT CHANGE UP (ref 10–20)
CALCIUM SERPL-MCNC: 9 MG/DL — SIGNIFICANT CHANGE UP (ref 8.5–10.1)
CHLORIDE SERPL-SCNC: 109 MMOL/L — SIGNIFICANT CHANGE UP (ref 98–110)
CO2 SERPL-SCNC: 27 MMOL/L — SIGNIFICANT CHANGE UP (ref 17–32)
CREAT SERPL-MCNC: 1.2 MG/DL — SIGNIFICANT CHANGE UP (ref 0.7–1.5)
GLUCOSE BLDC GLUCOMTR-MCNC: 93 MG/DL — SIGNIFICANT CHANGE UP (ref 70–99)
GLUCOSE SERPL-MCNC: 99 MG/DL — SIGNIFICANT CHANGE UP (ref 70–99)
POTASSIUM SERPL-MCNC: 4.5 MMOL/L — SIGNIFICANT CHANGE UP (ref 3.5–5)
POTASSIUM SERPL-SCNC: 4.5 MMOL/L — SIGNIFICANT CHANGE UP (ref 3.5–5)
PROT SERPL-MCNC: 5.9 G/DL — LOW (ref 6–8)
SODIUM SERPL-SCNC: 143 MMOL/L — SIGNIFICANT CHANGE UP (ref 135–146)

## 2020-09-27 PROCEDURE — 99239 HOSP IP/OBS DSCHRG MGMT >30: CPT

## 2020-09-27 RX ADMIN — TICAGRELOR 90 MILLIGRAM(S): 90 TABLET ORAL at 06:11

## 2020-09-27 RX ADMIN — LOSARTAN POTASSIUM 50 MILLIGRAM(S): 100 TABLET, FILM COATED ORAL at 06:11

## 2020-09-27 NOTE — DISCHARGE NOTE NURSING/CASE MANAGEMENT/SOCIAL WORK - PATIENT PORTAL LINK FT
You can access the FollowMyHealth Patient Portal offered by [facility] by registering at the following website: [email or web address]. By joining [employer]’s FollowMyHealth portal, you will also be able to view your health information using other applications (apps) compatible with our system.

## 2020-09-27 NOTE — PROGRESS NOTE ADULT - ASSESSMENT
Plan:    - Pain control as needed  - No Acute Surgical Intervention  - Trend LFT  - Encourage ambulation and incentive spirometer use (10x/hr when awake)  - GI and DVT prophylaxis  - Continue current management  - d/w Dr. Em Plan:  - There is no need for surgical intervention at this time. We will sign off on this patient. Please recall surgery team if needed.

## 2020-09-27 NOTE — PROGRESS NOTE ADULT - SUBJECTIVE AND OBJECTIVE BOX
GENERAL SURGERY PROGRESS NOTE     KATH MCCOLLUM  60y  Male  Hospital day :2d      EVENTS IN THE LAST 24 HRS: No acute events overnight, patient with improved of abdominal pain, No fever, chills, SOB, CP      T(F): 97 (09-26-20 @ 08:00), Max: 97.2 (09-26-20 @ 00:07)  HR: 60 (09-26-20 @ 08:00) (60 - 75)  BP: 169/76 (09-26-20 @ 08:00) (132/70 - 169/76)  RR: 18 (09-26-20 @ 08:00) (18 - 18)      PHYSICAL EXAM:  GENERAL: NAD, well-appearing  CHEST/LUNG: Clear to auscultation bilaterally  HEART: Regular rate and rhythm  ABDOMEN: Soft, Nontender, Nondistended;   EXTREMITIES:  No clubbing, cyanosis, or edema      DIET/FLUIDS: sodium chloride 0.45%. 1000 milliLiter(s) IV Continuous <Continuous>         GI proph:  pantoprazole  Injectable 40 milliGRAM(s) IV Push daily    AC/ proph: aspirin enteric coated 81 milliGRAM(s) Oral daily  enoxaparin Injectable 40 milliGRAM(s) SubCutaneous daily    ABx:         LABS  Labs:  CAPILLARY BLOOD GLUCOSE      POCT Blood Glucose.: 92 mg/dL (26 Sep 2020 07:23)  POCT Blood Glucose.: 151 mg/dL (25 Sep 2020 21:14)  POCT Blood Glucose.: 105 mg/dL (25 Sep 2020 16:33)  POCT Blood Glucose.: 92 mg/dL (25 Sep 2020 11:05)                          12.3   7.10  )-----------( 313      ( 26 Sep 2020 05:41 )             40.3       Auto Immature Granulocyte %: 0.3 % (09-26-20 @ 05:41)  Auto Neutrophil %: 52.9 % (09-26-20 @ 05:41)    09-26    141  |  105  |  9<L>  ----------------------------<  93  4.3   |  26  |  1.1      Calcium, Total Serum: 9.1 mg/dL (09-26-20 @ 05:41)      LFTs:             6.0  | 1.3  | 181      ------------------[290     ( 26 Sep 2020 05:41 )  3.5  | x    | 364            Lactate, Blood: 1.2 mmol/L (09-24-20 @ 04:04)                        RADIOLOGY & ADDITIONAL TESTS:      A/P        
GENERAL SURGERY PROGRESS NOTE     KATH MCCOLLUM  Male  Hospital day :3d      EVENTS IN THE LAST 24 HRS: Patient seen and examined, No acute events      T(F): 96.9 (09-27-20 @ 00:08), Max: 97.1 (09-26-20 @ 15:34)  HR: 57 (09-27-20 @ 00:08) (57 - 71)  BP: 140/68 (09-27-20 @ 00:08) (130/60 - 169/76)  RR: 18 (09-27-20 @ 00:08) (17 - 18)      PHYSICAL EXAM:  GENERAL: NAD, well-appearing  CHEST/LUNG: Clear to auscultation bilaterally  HEART: Regular rate and rhythm  ABDOMEN: Soft, Nontender, Nondistended;   EXTREMITIES:  No clubbing, cyanosis, or edema      DIET/FLUIDS: sodium chloride 0.45%. 1000 milliLiter(s) IV Continuous <Continuous>    N  GI proph:  pantoprazole  Injectable 40 milliGRAM(s) IV Push daily    AC/ proph: aspirin enteric coated 81 milliGRAM(s) Oral daily  enoxaparin Injectable 40 milliGRAM(s) SubCutaneous daily    ABx:         LABS  Labs:  CAPILLARY BLOOD GLUCOSE      POCT Blood Glucose.: 101 mg/dL (26 Sep 2020 20:54)  POCT Blood Glucose.: 114 mg/dL (26 Sep 2020 16:14)  POCT Blood Glucose.: 88 mg/dL (26 Sep 2020 11:22)  POCT Blood Glucose.: 92 mg/dL (26 Sep 2020 07:23)                          12.3   7.10  )-----------( 313      ( 26 Sep 2020 05:41 )             40.3       Auto Immature Granulocyte %: 0.3 % (09-26-20 @ 05:41)  Auto Neutrophil %: 52.9 % (09-26-20 @ 05:41)    09-26    141  |  105  |  9<L>  ----------------------------<  93  4.3   |  26  |  1.1      Calcium, Total Serum: 9.1 mg/dL (09-26-20 @ 05:41)      LFTs:             6.0  | 1.3  | 181      ------------------[290     ( 26 Sep 2020 05:41 )  3.5  | x    | 364         Lipase:x      Amylase:x             Coags:        RADIOLOGY & ADDITIONAL TESTS:    IMPRESSION:    1. Since September 13, 2020, overall unchanged exam, with minimally dilated common bile duct, measuring 8 mm, and no evidence of choledocholithiasis.    2. Cholelithiasis.  
Patient was seen and examined in ED3-12. Spoke with GI team. Chart reviewed.  No new events overnight- now feeling much better  Vital Signs Last 24 Hrs  T(F): 97.8 (24 Sep 2020 07:31), Max: 98.6 (24 Sep 2020 02:47)  HR: 65 (24 Sep 2020 07:31) (64 - 65)  BP: 106/53 (24 Sep 2020 07:31) (105/57 - 106/53)  SpO2: 96% (24 Sep 2020 07:31) (96% - 99%)  MEDICATIONS  (STANDING):    MEDICATIONS  (PRN):    Labs:                        12.1   10.62 )-----------( 345      ( 24 Sep 2020 04:04 )             39.5     24 Sep 2020 04:04    140    |  104    |  17     ----------------------------<  106    4.5     |  25     |  1.2      Ca    9.5        24 Sep 2020 04:04    TPro  6.4    /  Alb  3.9    /  TBili  1.4    /  DBili  0.6    /  AST  303    /  ALT  266    /  AlkPhos  221    24 Sep 2020 04:04          General: comfortable, NAD  Neurology: A&Ox3, nonfocal  Head:  Normocephalic, atraumatic  ENT:  Mucosa moist, no ulcerations  Neck:  Supple, no JVD,   Skin: tattoos  Resp: CTA B/L  CV: RRR, S1S2,   GI: Soft, bowel sounds  MS: No edema, + peripheral pulses, FROM all 4 extremity      A/P:  61 yo man with CAD s/p recent stent on brilinta- with acute on chronic abd pain and recent transaminitis- admitted by Dr Giordano of GI for workup    GI eval today to decide on repeating MRCP    if ERCP or other intervention planned, consult cardio    continue meds  DVT prophylaxis  Decubitus prevention- all measures as per RN protocol  Please call or text me with any questions or updates      
Patient was seen and examined. Spoke with Dr giordano. Chart reviewed.  No events overnight.  Vital Signs Last 24 Hrs  T(F): 98.2 (25 Sep 2020 02:42), Max: 98.2 (25 Sep 2020 02:42)  HR: 56 (25 Sep 2020 05:20) (53 - 58)  BP: 127/56 (25 Sep 2020 05:20) (106/51 - 127/56)  SpO2: 97% (25 Sep 2020 02:42) (97% - 99%)  MEDICATIONS  (STANDING):  aspirin enteric coated 81 milliGRAM(s) Oral daily  atorvastatin 40 milliGRAM(s) Oral at bedtime  enoxaparin Injectable 40 milliGRAM(s) SubCutaneous daily  influenza   Vaccine 0.5 milliLiter(s) IntraMuscular once  losartan 50 milliGRAM(s) Oral daily  metoprolol tartrate 25 milliGRAM(s) Oral two times a day  pantoprazole  Injectable 40 milliGRAM(s) IV Push daily  PARoxetine 10 milliGRAM(s) Oral daily  sodium chloride 0.45%. 1000 milliLiter(s) (75 mL/Hr) IV Continuous <Continuous>  ticagrelor 90 milliGRAM(s) Oral every 12 hours    MEDICATIONS  (PRN):  aluminum hydroxide/magnesium hydroxide/simethicone Suspension 30 milliLiter(s) Oral every 4 hours PRN Dyspepsia  LORazepam   Injectable 1 milliGRAM(s) IV Push once PRN Anxiety  ondansetron Injectable 4 milliGRAM(s) IV Push every 6 hours PRN Nausea    Labs:                        12.3   6.95  )-----------( 320      ( 25 Sep 2020 06:06 )             41.0                         12.1   10.62 )-----------( 345      ( 24 Sep 2020 04:04 )             39.5     25 Sep 2020 06:06    143    |  106    |  11     ----------------------------<  92     4.6     |  26     |  1.1    24 Sep 2020 04:04    140    |  104    |  17     ----------------------------<  106    4.5     |  25     |  1.2      Ca    9.3        25 Sep 2020 06:06  Ca    9.5        24 Sep 2020 04:04  Mg     2.1       25 Sep 2020 06:06    TPro  6.3    /  Alb  3.8    /  TBili  2.0    /  DBili  0.5    /  AST  331    /  ALT  480    /  AlkPhos  304    25 Sep 2020 06:06  TPro  6.4    /  Alb  3.9    /  TBili  1.4    /  DBili  0.6    /  AST  303    /  ALT  266    /  AlkPhos  221    24 Sep 2020 04:04          General: comfortable, NAD  Neurology: A&Ox3, nonfocal  Head:  Normocephalic, atraumatic  ENT:  Mucosa moist, no ulcerations  Neck:  Supple, no JVD,   Skin: no breakdowns (as per RN)  Resp: CTA B/L  CV: RRR, S1S2,   GI: Soft, NT, bowel sounds  MS: No edema, + peripheral pulses, FROM all 4 extremity      A/P:  61 yo man with CAD s/p recent stent on brilinta- with acute on chronic abd pain and recent transaminitis- admitted by Dr Giordano of GI for workup    GI eval today to decide on ERCP after MRCP done    as ERCP or other intervention is likely planned, consult cardio Dr Cardozo    DVT prophylaxis  Decubitus prevention- all measures as per RN protocol  Please call or text me with any questions or updates      
SUBJ: Patient seen and examined. No events overnight. He is tolerating oral intake with no abdominal pain.      MEDICATIONS  (STANDING):  aspirin enteric coated 81 milliGRAM(s) Oral daily  atorvastatin 40 milliGRAM(s) Oral at bedtime  enoxaparin Injectable 40 milliGRAM(s) SubCutaneous daily  influenza   Vaccine 0.5 milliLiter(s) IntraMuscular once  losartan 50 milliGRAM(s) Oral daily  metoprolol tartrate 25 milliGRAM(s) Oral two times a day  pantoprazole  Injectable 40 milliGRAM(s) IV Push daily  PARoxetine 10 milliGRAM(s) Oral daily  sodium chloride 0.45%. 1000 milliLiter(s) (75 mL/Hr) IV Continuous <Continuous>  ticagrelor 90 milliGRAM(s) Oral every 12 hours    MEDICATIONS  (PRN):  aluminum hydroxide/magnesium hydroxide/simethicone Suspension 30 milliLiter(s) Oral every 4 hours PRN Dyspepsia  ondansetron Injectable 4 milliGRAM(s) IV Push every 6 hours PRN Nausea            Vital Signs Last 24 Hrs  T(C): 36.2 (26 Sep 2020 15:34), Max: 36.2 (26 Sep 2020 00:07)  T(F): 97.1 (26 Sep 2020 15:34), Max: 97.2 (26 Sep 2020 00:07)  HR: 71 (26 Sep 2020 15:34) (60 - 71)  BP: 130/60 (26 Sep 2020 15:34) (130/60 - 169/76)  BP(mean): 64 (26 Sep 2020 05:02) (64 - 64)  RR: 17 (26 Sep 2020 15:34) (17 - 18)  SpO2: --     REVIEW OF SYSTEMS:  CONSTITUTIONAL: No fever  NECK: No pain or stiffness  CARDIOVASCULAR: patient denies chest pain, shortness of breath or palpitations .  Repiratory: No cough or wheezing.  NEUROLOGICAL: No focal deficits to report.  GI: no BRBPR, no N,V,diarrhea.    PSYCHIATRY: normal mood and affect  HEENT: no nasal discharge, no ecchymosis  SKIN: no ecchymosis, no breakdown  MUSCULOSKELETAL: Full range of motion x4.      PHYSICAL EXAM:  GENERAL: NAD, well-groomed, well-developed  HEAD:  Atraumatic, Normocephalic  NECK: Supple, No JVD, Normal thyroid  NERVOUS SYSTEM:  Alert & Oriented X3, Good concentration  CHEST/LUNG: Clear to percussion bilaterally; No rales, rhonchi, wheezing, or rubs  HEART: Regular rate and rhythm; No murmurs, rubs, or gallops  ABDOMEN: Soft, Nontender, Nondistended; Bowel sounds present  EXTREMITIES:  No clubbing, cyanosis, or edema  SKIN: No rashes or lesions  VASCULAR: 	Equal and normal pulses  dorsalis pedis      	  TELEMETRY:      LABS:                        12.3   7.10  )-----------( 313      ( 26 Sep 2020 05:41 )             40.3     09-26    141  |  105  |  9<L>  ----------------------------<  93  4.3   |  26  |  1.1    Ca    9.1      26 Sep 2020 05:41  Mg     2.1     09-25    TPro  6.0  /  Alb  3.5  /  TBili  1.3<H>  /  DBili  x   /  AST  181<H>  /  ALT  364<H>  /  AlkPhos  290<H>  09-26            I&O's Summary    25 Sep 2020 07:01  -  26 Sep 2020 07:00  --------------------------------------------------------  IN: 225 mL / OUT: 0 mL / NET: 225 mL    26 Sep 2020 07:01  -  26 Sep 2020 20:15  --------------------------------------------------------  IN: 240 mL / OUT: 1200 mL / NET: -960 mL      BNP  RADIOLOGY & ADDITIONAL STUDIES:    IMPRESSION AND PLAN:    Recent STEMI s/p PCI to LM and LCA in August , has patent CHRISTIANSON to LAD   Elevated LFTS and abdominal pain related to CBD stone: improving  - Management as per primary team  - Continue DAPT  - Will follow  
SUBJECTIVE:    Patient is a 60y old Male who presents with a chief complaint of RUQ pain (25 Sep 2020 08:29)    Currently admitted to medicine with the primary diagnosis of RUQ pain       Today is hospital day 1d. This morning he is resting comfortably in bed and reports no new issues or overnight events.     PAST MEDICAL & SURGICAL HISTORY  Hiatal hernia with GERD    DM (diabetes mellitus)    CAD S/P percutaneous coronary angioplasty    Ulcer of esophagus    Chronic gastric ulcer without hemorrhage and without perforation    High cholesterol    GERD (gastroesophageal reflux disease)    H/O right coronary artery stent placement  August 17, 2020    Cardiac arrhythmia  bypass surgery 9 years ago    Coronary artery disease involving other coronary artery bypass graft, angina presence unspecified      SOCIAL HISTORY:  Negative for smoking/alcohol/drug use.     ALLERGIES:  No Known Allergies    MEDICATIONS:  STANDING MEDICATIONS  aspirin enteric coated 81 milliGRAM(s) Oral daily  atorvastatin 40 milliGRAM(s) Oral at bedtime  enoxaparin Injectable 40 milliGRAM(s) SubCutaneous daily  influenza   Vaccine 0.5 milliLiter(s) IntraMuscular once  losartan 50 milliGRAM(s) Oral daily  metoprolol tartrate 25 milliGRAM(s) Oral two times a day  pantoprazole  Injectable 40 milliGRAM(s) IV Push daily  PARoxetine 10 milliGRAM(s) Oral daily  sodium chloride 0.45%. 1000 milliLiter(s) IV Continuous <Continuous>  ticagrelor 90 milliGRAM(s) Oral every 12 hours    PRN MEDICATIONS  aluminum hydroxide/magnesium hydroxide/simethicone Suspension 30 milliLiter(s) Oral every 4 hours PRN  ondansetron Injectable 4 milliGRAM(s) IV Push every 6 hours PRN    VITALS:   T(F): 96.1  HR: 53  BP: 127/60  RR: 18  SpO2: 97%    LABS:                        12.3   6.95  )-----------( 320      ( 25 Sep 2020 06:06 )             41.0     09-25    143  |  106  |  11  ----------------------------<  92  4.6   |  26  |  1.1    Ca    9.3      25 Sep 2020 06:06  Mg     2.1     09-25    TPro  6.3  /  Alb  3.8  /  TBili  2.0<H>  /  DBili  0.5<H>  /  AST  331<H>  /  ALT  480<H>  /  AlkPhos  304<H>  09-25              CARDIAC MARKERS ( 24 Sep 2020 04:04 )  x     / <0.01 ng/mL / x     / x     / x          RADIOLOGY:    PHYSICAL EXAM:  GEN: No acute distress  LUNGS: Clear to auscultation bilaterally   HEART: S1/S2 present. RRR.   ABD: Soft, non-tender, non-distended. Bowel sounds present  EXT: NC/NC/NE/2+PP/FRYE/Skin Intact.   NEURO: AAOX3    Intravenous access:   NG tube:   Snow Catheter:         
SUBJECTIVE:    Patient is a 60y old Male who presents with a chief complaint of RUQ pain (26 Sep 2020 08:37)    Currently admitted to medicine with the primary diagnosis of RUQ pain       Today is hospital day 2d. This morning he is resting comfortably in bed and reports no new issues or overnight events.     PAST MEDICAL & SURGICAL HISTORY  Hiatal hernia with GERD    DM (diabetes mellitus)    CAD S/P percutaneous coronary angioplasty    Ulcer of esophagus    Chronic gastric ulcer without hemorrhage and without perforation    High cholesterol    GERD (gastroesophageal reflux disease)    H/O right coronary artery stent placement  August 17, 2020    Cardiac arrhythmia  bypass surgery 9 years ago    Coronary artery disease involving other coronary artery bypass graft, angina presence unspecified      SOCIAL HISTORY:  Negative for smoking/alcohol/drug use.     ALLERGIES:  No Known Allergies    MEDICATIONS:  STANDING MEDICATIONS  aspirin enteric coated 81 milliGRAM(s) Oral daily  atorvastatin 40 milliGRAM(s) Oral at bedtime  enoxaparin Injectable 40 milliGRAM(s) SubCutaneous daily  influenza   Vaccine 0.5 milliLiter(s) IntraMuscular once  losartan 50 milliGRAM(s) Oral daily  metoprolol tartrate 25 milliGRAM(s) Oral two times a day  pantoprazole  Injectable 40 milliGRAM(s) IV Push daily  PARoxetine 10 milliGRAM(s) Oral daily  sodium chloride 0.45%. 1000 milliLiter(s) IV Continuous <Continuous>  ticagrelor 90 milliGRAM(s) Oral every 12 hours    PRN MEDICATIONS  aluminum hydroxide/magnesium hydroxide/simethicone Suspension 30 milliLiter(s) Oral every 4 hours PRN  ondansetron Injectable 4 milliGRAM(s) IV Push every 6 hours PRN    VITALS:   T(F): 97  HR: 60  BP: 169/76  RR: 18  SpO2: --    LABS:                        12.3   7.10  )-----------( 313      ( 26 Sep 2020 05:41 )             40.3     09-26    141  |  105  |  9<L>  ----------------------------<  93  4.3   |  26  |  1.1    Ca    9.1      26 Sep 2020 05:41  Mg     2.1     09-25    TPro  6.0  /  Alb  3.5  /  TBili  1.3<H>  /  DBili  x   /  AST  181<H>  /  ALT  364<H>  /  AlkPhos  290<H>  09-26                  RADIOLOGY:    PHYSICAL EXAM:  GEN: No acute distress  LUNGS: Clear to auscultation bilaterally   HEART: S1/S2 present. RRR.   ABD: Soft, non-tender, non-distended. Bowel sounds present  EXT: NC/NC/NE/2+PP/FRYE/Skin Intact.   NEURO: AAOX3    Intravenous access:   NG tube:   Snow Catheter:

## 2020-09-28 LAB
HSV1 AB FLD QL: SIGNIFICANT CHANGE UP TITER
HSV2 AB FLD-ACNC: SIGNIFICANT CHANGE UP TITER
MITOCHONDRIA AB SER-ACNC: SIGNIFICANT CHANGE UP
SMOOTH MUSCLE AB SER-ACNC: SIGNIFICANT CHANGE UP

## 2020-10-12 DIAGNOSIS — Z79.84 LONG TERM (CURRENT) USE OF ORAL HYPOGLYCEMIC DRUGS: ICD-10-CM

## 2020-10-12 DIAGNOSIS — E78.5 HYPERLIPIDEMIA, UNSPECIFIED: ICD-10-CM

## 2020-10-12 DIAGNOSIS — R74.0 NONSPECIFIC ELEVATION OF LEVELS OF TRANSAMINASE AND LACTIC ACID DEHYDROGENASE [LDH]: ICD-10-CM

## 2020-10-12 DIAGNOSIS — Z95.1 PRESENCE OF AORTOCORONARY BYPASS GRAFT: ICD-10-CM

## 2020-10-12 DIAGNOSIS — K80.20 CALCULUS OF GALLBLADDER WITHOUT CHOLECYSTITIS WITHOUT OBSTRUCTION: ICD-10-CM

## 2020-10-12 DIAGNOSIS — K21.9 GASTRO-ESOPHAGEAL REFLUX DISEASE WITHOUT ESOPHAGITIS: ICD-10-CM

## 2020-10-12 DIAGNOSIS — I25.2 OLD MYOCARDIAL INFARCTION: ICD-10-CM

## 2020-10-12 DIAGNOSIS — Z79.82 LONG TERM (CURRENT) USE OF ASPIRIN: ICD-10-CM

## 2020-10-12 DIAGNOSIS — I10 ESSENTIAL (PRIMARY) HYPERTENSION: ICD-10-CM

## 2020-10-12 DIAGNOSIS — R10.11 RIGHT UPPER QUADRANT PAIN: ICD-10-CM

## 2020-10-12 DIAGNOSIS — I25.10 ATHEROSCLEROTIC HEART DISEASE OF NATIVE CORONARY ARTERY WITHOUT ANGINA PECTORIS: ICD-10-CM

## 2020-10-12 DIAGNOSIS — Z87.891 PERSONAL HISTORY OF NICOTINE DEPENDENCE: ICD-10-CM

## 2020-10-12 DIAGNOSIS — Z95.5 PRESENCE OF CORONARY ANGIOPLASTY IMPLANT AND GRAFT: ICD-10-CM

## 2020-12-30 NOTE — ED PROVIDER NOTE - CARE PLAN
Attempted to call patient. Phone rang for a minute and a half with no answer. Will attempt to call patient back later.   with patient Principal Discharge DX:	Hiccups  Secondary Diagnosis:	Esophagitis

## 2021-01-01 NOTE — DISCHARGE NOTE NURSING/CASE MANAGEMENT/SOCIAL WORK - FLU SEASON?
Nursery  Record    Subjective:     DANY Sampson is a male infant born on 2019 at 10:24 AM . He weighed 3.955 kg and measured 21\"  in length. Apgars were 8 and 9. Presentation was . Maternal Data:     Delivery Type: , Low Transverse   Delivery Resuscitation:   Number of Vessels:    Cord Events:   Meconium Stained: None  Amniotic Fluid Description: Clear      Information for the patient's mother:  Lopez Garay [[id number]]   Gestational Age: 38w3d   Prenatal Labs:  Lab Results   Component Value Date/Time    ABO/Rh(D) A POSITIVE 2021 06:20 AM    HBsAg, External Negative 2021 12:00 AM    HIV, External Negative 2021 12:00 AM    Rubella, External Immune 2021 12:00 AM    RPR, External Non-reactive 2021 12:00 AM    Gonorrhea, External Negative 2021 12:00 AM    Chlamydia, External Negative 2021 12:00 AM    GrBStrep, External Negative 2021 12:00 AM    ABO,Rh A Positive 2021 12:00 AM            Feeding Method Used:  Bottle      Objective:     Visit Vitals  Pulse 160   Temp 98.3 °F (36.8 °C)   Resp 52   Ht 53.3 cm   Wt 3.866 kg   HC 35 cm   BMI 13.59 kg/m²     Patient Vitals for the past 72 hrs:   Pre Ductal O2 Sat (%)   10/14/21 0220 99     Patient Vitals for the past 72 hrs:   Post Ductal O2 Sat (%)   10/14/21 0220 100         Results for orders placed or performed during the hospital encounter of 10/12/21   BILIRUBIN, TOTAL   Result Value Ref Range    Bilirubin, total 5.1 <7.2 MG/DL   GLUCOSE, POC   Result Value Ref Range    Glucose (POC) 55 50 - 110 mg/dL    Performed by Reji Jones    GLUCOSE, POC   Result Value Ref Range    Glucose (POC) 74 50 - 110 mg/dL    Performed by [facility]    GLUCOSE, POC   Result Value Ref Range    Glucose (POC) 70 50 - 110 mg/dL    Performed by [facility]       Recent Results (from the past 24 hour(s))   BILIRUBIN, TOTAL    Collection Time: 10/14/21  1:44 AM   Result Value Ref Range Bilirubin, total 5.1 <7.2 MG/DL          Formula: Yes  Formula Type: Similac Pro-Advance  Reason for Formula Supplementation : Mother's choice      Physical Exam:    Code for table:  O No abnormality  X Abnormally (describe abnormal findings) Admission Exam  CODE Admission Exam  Description of  Findings DischargeExam  CODE Discharge Exam  Description of  Findings   General Appearance o Well appearing 0 Alert and active. Well appearing   Skin o Pink, well perfused, no rashes/lesions 0 Pink and intact. Well perfused. Head, Neck o Normocephalic. AF flat/soft. Neck supple, clavicles intact 0 AF soft and flat   Eyes o Deferred due to ointment 0 +RR bilat, PERRL   Ears, Nose, & Throat o Ears normal set, palate intact 0 Palate intact   Thorax o  0 wnl   Lungs o CTA 0 CTA   Heart o RRR without murmurs; femoral pulses 2+ and equal 0 No murmur, NSR, pulses wnl   Abdomen o 3 vessel cord, no masses 0 Soft with active bowel sounds   Genitalia o Normal male 0 Term male- meatus central, testes descended bilat   Anus o Patent 0 patent   Trunk and Spine o No dwight/dimples 0 wnl   Extremities o No hip clicks/clunks 0 FROM X5W, No hip clicks/clunks   Reflexes o + grasp/suck/marcus 0 + suck/grasp/marcus   Examiner  Claudia Maher MD  [place] [facility] 2021 0540        Initial  Screen Completed: Yes  Immunization History   Administered Date(s) Administered    Hep B, Adol/Ped 2021       Hearing Screen:  Hearing Screen: Yes  Left Ear: Pass  Right Ear: Pass    Metabolic Screen:  Initial [place] Screen Completed: Yes      Assessment/Plan:     Active Problems:    Single liveborn, born in hospital, delivered by  delivery ([birth year]21)         Impression on admission: Term male infant born via C section to a GBS neg mother. VSS, exam as above. Mother plans to formula feed. Pediatrician at discharge Dr Mena Smith. Plan to initiate  care, follow feeding, output, and weight.      Signed By:  Claudia Maher MD Date/Time 10/12/21 at 1200     Progress Note:Doing well, VSS, stooling and voiding, feeding well, exam normal  Camden Valadezeira 10/14/21 [id number] for 10/13/21           Impression on Discharge: \"Wade\" Roger Sliva is a 2 DO term, well appearing, AGA infant. He is alert and active on exam.  Pink and well perfused. Infant formula feeding well taking 22-40 mls over the past 24 hours. Weight 3.866kg, down 2.3 % from BW. Infant has voided x 6. Stooled x 5. Bilirubin 5.1 at 39 hours and low risk. Exam wnl. Parents updated. Opportunity for questions given. Plan: Circ and circ watch prior to discharge. DC to home with pediatrician follow up on 10/15. Naif Arellano [facility]  2021  0540      Late Entry: Progress Note: Term, well appearing male infant, 3998 grams, up 1.08% from birthweight, po ad juanito Similac ProAdvance @ 23mL-42mL per feeding, urine x 3, stool x 3. Exam as follows: AFSOF, responds appropriately to stimulation, skin warm without rashes or lesions, lungs CTA with equal aeration bilaterally, RRR without murmur, mucous membranes moist & pink, CFT < 3 seconds, abdomen soft, rounded and non distended with active bowel sounds, normal male external genitalia, reflexes appropriate for gestational age. Plan to continue normal  care. Mother updated at bedside, time allowed for questions and answers, no current concerns. MADONNA Pan-BC 10/13/21 @ 0715; charter 10/14/21 @ 077 [id number]    Discharge weight:    Wt Readings from Last 1 Encounters:   10/14/21 3.866 kg (81 %, Z= 0.86)*     * Growth percentiles are based on WHO (Boys, 0-2 years) data. Yes...

## 2021-01-29 NOTE — PATIENT PROFILE ADULT - NSPROGENOTHERPROVIDER_GEN_A_NUR
[FreeTextEntry1] : returns for follow up\par now on pregnyl and clomid\par generally improved \par signficant spouse anxiety\par  none

## 2021-03-15 ENCOUNTER — TRANSCRIPTION ENCOUNTER (OUTPATIENT)
Age: 61
End: 2021-03-15

## 2022-02-04 NOTE — PROGRESS NOTE ADULT - SUBJECTIVE AND OBJECTIVE BOX
CHUNKATH  58y, Male  Allergy: No Known Allergies    Hospital Day: 2d    Patient seen and examined earlier today. No acute overnight events. Reports that he still has hiccups however they have improved today. Reports some epigastric discomfort from hiccups however improving. Denies N/V/D.    PMH/PSH:  PAST MEDICAL & SURGICAL HISTORY:  Chronic gastric ulcer without hemorrhage and without perforation  High cholesterol  GERD (gastroesophageal reflux disease)  Cardiac abnormality  Chronic obstructive pulmonary disease  Cardiac arrhythmia: bypass surgery 9 years ago  Coronary artery disease involving other coronary artery bypass graft, angina presence unspecified        VITALS:  T(F): 97.5 (02-20-19 @ 11:47), Max: 97.5 (02-20-19 @ 11:47)  HR: 82 (02-20-19 @ 11:47)  BP: 132/77 (02-20-19 @ 11:47) (118/62 - 161/77)  RR: 16 (02-20-19 @ 11:47)  SpO2: 98% (02-20-19 @ 11:47)    TESTS & MEASUREMENTS:  Weight (Kg): 103.4 (02-20-19 @ 11:47)  BMI (kg/m2): 35.7 (02-20)                          11.9   10.72 )-----------( 357      ( 19 Feb 2019 09:36 )             40.8       02-19    142  |  103  |  12  ----------------------------<  130<H>  4.4   |  30  |  0.8    Ca    8.5      19 Feb 2019 09:36  Mg     2.2     02-19      RECENT DIAGNOSTIC ORDERS:  Diet, NPO:   Except Medications (02-20-19 @ 10:58)      MEDICATIONS:  MEDICATIONS  (STANDING):  aspirin enteric coated 81 milliGRAM(s) Oral daily  clopidogrel Tablet 75 milliGRAM(s) Oral daily  heparin  Injectable 5000 Unit(s) SubCutaneous every 12 hours  metoprolol succinate ER 25 milliGRAM(s) Oral daily  NIFEdipine XL 30 milliGRAM(s) Oral daily  pantoprazole    Tablet 40 milliGRAM(s) Oral two times a day  PARoxetine 10 milliGRAM(s) Oral daily  sucralfate 1 Gram(s) Oral four times a day    MEDICATIONS  (PRN):  ALBUTerol    0.083%. 2.5 milliGRAM(s) Nebulizer once PRN Bronchospasm  chlorproMAZINE    Tablet 25 milliGRAM(s) Oral two times a day PRN hiccups  metoclopramide Injectable 5 milliGRAM(s) IV Push every 12 hours PRN nausea      HOME MEDICATIONS:  Advair Diskus 100 mcg-50 mcg inhalation powder (02-17)  Aspir 81 oral delayed release tablet (02-17)  Breo Ellipta 100 mcg-25 mcg/inh inhalation powder (02-17)  Janumet 50 mg-500 mg oral tablet (02-17)  metoprolol succinate 25 mg oral tablet, extended release (02-17)  NIFEdipine 10 mg oral capsule (02-17)  Paxil 10 mg oral tablet (02-17)  Plavix 75 mg oral tablet (02-17)  Prevacid 30 mg oral delayed release capsule (02-17)  Proventil 2.5 mg/3 mL (0.083%) inhalation solution (02-17)  valsartan 40 mg oral tablet (02-17)      PHYSICAL EXAM:  GENERAL: middle age male, in NAD, well-developed  HEAD:  Atraumatic, Normocephalic  CHEST/LUNG: Clear to auscultation bilaterally; No wheeze  HEART: Regular rate and rhythm; No murmurs, rubs, or gallops  ABDOMEN: Soft, Nontender, Nondistended; Bowel sounds present  EXTREMITIES:  2+ Peripheral Pulses, No clubbing, cyanosis, or edema  PSYCH: AAOx3  NEUROLOGY: non-focal  SKIN: No rashes or lesions [Home] : at home, [unfilled] , at the time of the visit. [Medical Office: ([facility])___] : at the medical office located in  [Verbal consent obtained from patient] : the patient, [unfilled] [Post-hospitalization from ___ Hospital] : Post-hospitalization from [unfilled] Hospital [Admitted on: ___] : The patient was admitted on [unfilled] [Discharged on ___] : discharged on [unfilled] [Discharge Summary] : discharge summary [Pertinent Labs] : pertinent labs [Radiology Findings] : radiology findings [Discharge Med List] : discharge medication list [Other: ____] : [unfilled] [Med Reconciliation] : medication reconciliation has been completed [Patient Contacted By: ____] : and contacted by [unfilled] [FreeTextEntry2] : Patient is a 77-year-old gentleman status post hospitalization at [facility] he was hospitalized from January 25, 2022 to January 28, 2022.  Patient was admitted with acute urinary tract infection with E. coli bacteremia subsequently while hospitalized he was also diagnosed with COVID-19 patient states that the symptoms for COVID-19 lasted approximately 24 hours patient was treated appropriately for urinary tract infection with IV ceftriaxone and then subsequently discharged with oral antibiotics.  Entire hospitalization was reviewed, consults were reviewed, lab work was reviewed and meds reconciliation was done.  Patient also had a right lower extremity DVT placed on Eliquis and will be following up with vascular surgery.\par \par Patient has multiple medical problems which include diabetes mellitus type 2, diabetic neuropathy, hypertension, hyperlipidemia, hypothyroidism and rheumatoid arthritis.

## 2022-03-21 ENCOUNTER — INPATIENT (INPATIENT)
Facility: HOSPITAL | Age: 62
LOS: 1 days | Discharge: HOME | End: 2022-03-23
Attending: STUDENT IN AN ORGANIZED HEALTH CARE EDUCATION/TRAINING PROGRAM | Admitting: STUDENT IN AN ORGANIZED HEALTH CARE EDUCATION/TRAINING PROGRAM
Payer: SELF-PAY

## 2022-03-21 VITALS
OXYGEN SATURATION: 100 % | WEIGHT: 208.78 LBS | TEMPERATURE: 96 F | HEIGHT: 67 IN | SYSTOLIC BLOOD PRESSURE: 218 MMHG | DIASTOLIC BLOOD PRESSURE: 104 MMHG | RESPIRATION RATE: 17 BRPM | HEART RATE: 84 BPM

## 2022-03-21 DIAGNOSIS — I25.810 ATHEROSCLEROSIS OF CORONARY ARTERY BYPASS GRAFT(S) WITHOUT ANGINA PECTORIS: Chronic | ICD-10-CM

## 2022-03-21 DIAGNOSIS — Z95.5 PRESENCE OF CORONARY ANGIOPLASTY IMPLANT AND GRAFT: Chronic | ICD-10-CM

## 2022-03-21 DIAGNOSIS — I49.9 CARDIAC ARRHYTHMIA, UNSPECIFIED: Chronic | ICD-10-CM

## 2022-03-21 PROBLEM — I25.10 ATHEROSCLEROTIC HEART DISEASE OF NATIVE CORONARY ARTERY WITHOUT ANGINA PECTORIS: Chronic | Status: ACTIVE | Noted: 2020-09-24

## 2022-03-21 PROBLEM — K21.9 GASTRO-ESOPHAGEAL REFLUX DISEASE WITHOUT ESOPHAGITIS: Chronic | Status: ACTIVE | Noted: 2020-09-24

## 2022-03-21 PROBLEM — E11.9 TYPE 2 DIABETES MELLITUS WITHOUT COMPLICATIONS: Chronic | Status: ACTIVE | Noted: 2020-09-24

## 2022-03-21 LAB
ALBUMIN SERPL ELPH-MCNC: 4.3 G/DL — SIGNIFICANT CHANGE UP (ref 3.5–5.2)
ALP SERPL-CCNC: 95 U/L — SIGNIFICANT CHANGE UP (ref 30–115)
ALT FLD-CCNC: 37 U/L — SIGNIFICANT CHANGE UP (ref 0–41)
ANION GAP SERPL CALC-SCNC: 10 MMOL/L — SIGNIFICANT CHANGE UP (ref 7–14)
APTT BLD: 37.1 SEC — SIGNIFICANT CHANGE UP (ref 27–39.2)
APTT BLD: 37.8 SEC — SIGNIFICANT CHANGE UP (ref 27–39.2)
AST SERPL-CCNC: 51 U/L — HIGH (ref 0–41)
BASOPHILS # BLD AUTO: 0.1 K/UL — SIGNIFICANT CHANGE UP (ref 0–0.2)
BASOPHILS NFR BLD AUTO: 0.9 % — SIGNIFICANT CHANGE UP (ref 0–1)
BILIRUB SERPL-MCNC: 0.9 MG/DL — SIGNIFICANT CHANGE UP (ref 0.2–1.2)
BUN SERPL-MCNC: 13 MG/DL — SIGNIFICANT CHANGE UP (ref 10–20)
CALCIUM SERPL-MCNC: 10.5 MG/DL — HIGH (ref 8.5–10.1)
CHLORIDE SERPL-SCNC: 105 MMOL/L — SIGNIFICANT CHANGE UP (ref 98–110)
CO2 SERPL-SCNC: 28 MMOL/L — SIGNIFICANT CHANGE UP (ref 17–32)
CREAT SERPL-MCNC: 1 MG/DL — SIGNIFICANT CHANGE UP (ref 0.7–1.5)
D DIMER BLD IA.RAPID-MCNC: 103 NG/ML DDU — SIGNIFICANT CHANGE UP (ref 0–230)
EGFR: 87 ML/MIN/1.73M2 — SIGNIFICANT CHANGE UP
EOSINOPHIL # BLD AUTO: 0.52 K/UL — SIGNIFICANT CHANGE UP (ref 0–0.7)
EOSINOPHIL NFR BLD AUTO: 4.7 % — SIGNIFICANT CHANGE UP (ref 0–8)
GLUCOSE BLDC GLUCOMTR-MCNC: 103 MG/DL — HIGH (ref 70–99)
GLUCOSE SERPL-MCNC: 120 MG/DL — HIGH (ref 70–99)
HCT VFR BLD CALC: 44.5 % — SIGNIFICANT CHANGE UP (ref 42–52)
HGB BLD-MCNC: 13.9 G/DL — LOW (ref 14–18)
IMM GRANULOCYTES NFR BLD AUTO: 0.4 % — HIGH (ref 0.1–0.3)
INR BLD: 0.9 RATIO — SIGNIFICANT CHANGE UP (ref 0.65–1.3)
LYMPHOCYTES # BLD AUTO: 27.3 % — SIGNIFICANT CHANGE UP (ref 20.5–51.1)
LYMPHOCYTES # BLD AUTO: 3 K/UL — SIGNIFICANT CHANGE UP (ref 1.2–3.4)
MAGNESIUM SERPL-MCNC: 1.8 MG/DL — SIGNIFICANT CHANGE UP (ref 1.8–2.4)
MCHC RBC-ENTMCNC: 26.8 PG — LOW (ref 27–31)
MCHC RBC-ENTMCNC: 31.2 G/DL — LOW (ref 32–37)
MCV RBC AUTO: 85.7 FL — SIGNIFICANT CHANGE UP (ref 80–94)
MONOCYTES # BLD AUTO: 1.1 K/UL — HIGH (ref 0.1–0.6)
MONOCYTES NFR BLD AUTO: 10 % — HIGH (ref 1.7–9.3)
NEUTROPHILS # BLD AUTO: 6.22 K/UL — SIGNIFICANT CHANGE UP (ref 1.4–6.5)
NEUTROPHILS NFR BLD AUTO: 56.7 % — SIGNIFICANT CHANGE UP (ref 42.2–75.2)
NRBC # BLD: 0 /100 WBCS — SIGNIFICANT CHANGE UP (ref 0–0)
NT-PROBNP SERPL-SCNC: 1288 PG/ML — HIGH (ref 0–300)
PLATELET # BLD AUTO: 281 K/UL — SIGNIFICANT CHANGE UP (ref 130–400)
POTASSIUM SERPL-MCNC: 4.5 MMOL/L — SIGNIFICANT CHANGE UP (ref 3.5–5)
POTASSIUM SERPL-SCNC: 4.5 MMOL/L — SIGNIFICANT CHANGE UP (ref 3.5–5)
PROT SERPL-MCNC: 7.1 G/DL — SIGNIFICANT CHANGE UP (ref 6–8)
PROTHROM AB SERPL-ACNC: 10.4 SEC — SIGNIFICANT CHANGE UP (ref 9.95–12.87)
RBC # BLD: 5.19 M/UL — SIGNIFICANT CHANGE UP (ref 4.7–6.1)
RBC # FLD: 13.4 % — SIGNIFICANT CHANGE UP (ref 11.5–14.5)
SARS-COV-2 RNA SPEC QL NAA+PROBE: SIGNIFICANT CHANGE UP
SODIUM SERPL-SCNC: 143 MMOL/L — SIGNIFICANT CHANGE UP (ref 135–146)
TROPONIN T SERPL-MCNC: 0.22 NG/ML — CRITICAL HIGH
TROPONIN T SERPL-MCNC: 0.3 NG/ML — CRITICAL HIGH
TROPONIN T SERPL-MCNC: 0.59 NG/ML — CRITICAL HIGH
WBC # BLD: 10.98 K/UL — HIGH (ref 4.8–10.8)
WBC # FLD AUTO: 10.98 K/UL — HIGH (ref 4.8–10.8)

## 2022-03-21 PROCEDURE — 99284 EMERGENCY DEPT VISIT MOD MDM: CPT | Mod: 25

## 2022-03-21 PROCEDURE — 93308 TTE F-UP OR LMTD: CPT | Mod: 26

## 2022-03-21 PROCEDURE — 99291 CRITICAL CARE FIRST HOUR: CPT

## 2022-03-21 PROCEDURE — 93010 ELECTROCARDIOGRAM REPORT: CPT

## 2022-03-21 PROCEDURE — 76705 ECHO EXAM OF ABDOMEN: CPT | Mod: 26

## 2022-03-21 PROCEDURE — 71046 X-RAY EXAM CHEST 2 VIEWS: CPT | Mod: 26

## 2022-03-21 RX ORDER — TICAGRELOR 90 MG/1
90 TABLET ORAL ONCE
Refills: 0 | Status: COMPLETED | OUTPATIENT
Start: 2022-03-21 | End: 2022-03-21

## 2022-03-21 RX ORDER — METOPROLOL TARTRATE 50 MG
25 TABLET ORAL
Refills: 0 | Status: DISCONTINUED | OUTPATIENT
Start: 2022-03-21 | End: 2022-03-23

## 2022-03-21 RX ORDER — HEPARIN SODIUM 5000 [USP'U]/ML
1600 INJECTION INTRAVENOUS; SUBCUTANEOUS
Qty: 25000 | Refills: 0 | Status: DISCONTINUED | OUTPATIENT
Start: 2022-03-21 | End: 2022-03-22

## 2022-03-21 RX ORDER — SODIUM CHLORIDE 9 MG/ML
1000 INJECTION INTRAMUSCULAR; INTRAVENOUS; SUBCUTANEOUS
Refills: 0 | Status: COMPLETED | OUTPATIENT
Start: 2022-03-22 | End: 2022-03-22

## 2022-03-21 RX ORDER — MAGNESIUM SULFATE 500 MG/ML
2 VIAL (ML) INJECTION ONCE
Refills: 0 | Status: COMPLETED | OUTPATIENT
Start: 2022-03-21 | End: 2022-03-21

## 2022-03-21 RX ORDER — HEPARIN SODIUM 5000 [USP'U]/ML
INJECTION INTRAVENOUS; SUBCUTANEOUS
Qty: 25000 | Refills: 0 | Status: DISCONTINUED | OUTPATIENT
Start: 2022-03-21 | End: 2022-03-21

## 2022-03-21 RX ORDER — ASPIRIN/CALCIUM CARB/MAGNESIUM 324 MG
81 TABLET ORAL DAILY
Refills: 0 | Status: DISCONTINUED | OUTPATIENT
Start: 2022-03-22 | End: 2022-03-23

## 2022-03-21 RX ORDER — LOSARTAN POTASSIUM 100 MG/1
50 TABLET, FILM COATED ORAL ONCE
Refills: 0 | Status: COMPLETED | OUTPATIENT
Start: 2022-03-21 | End: 2022-03-21

## 2022-03-21 RX ORDER — TICAGRELOR 90 MG/1
90 TABLET ORAL EVERY 12 HOURS
Refills: 0 | Status: DISCONTINUED | OUTPATIENT
Start: 2022-03-22 | End: 2022-03-23

## 2022-03-21 RX ORDER — PANTOPRAZOLE SODIUM 20 MG/1
40 TABLET, DELAYED RELEASE ORAL
Refills: 0 | Status: DISCONTINUED | OUTPATIENT
Start: 2022-03-21 | End: 2022-03-23

## 2022-03-21 RX ORDER — ASPIRIN/CALCIUM CARB/MAGNESIUM 324 MG
162 TABLET ORAL ONCE
Refills: 0 | Status: COMPLETED | OUTPATIENT
Start: 2022-03-21 | End: 2022-03-21

## 2022-03-21 RX ORDER — LOSARTAN POTASSIUM 100 MG/1
50 TABLET, FILM COATED ORAL DAILY
Refills: 0 | Status: DISCONTINUED | OUTPATIENT
Start: 2022-03-21 | End: 2022-03-23

## 2022-03-21 RX ORDER — ATORVASTATIN CALCIUM 80 MG/1
80 TABLET, FILM COATED ORAL AT BEDTIME
Refills: 0 | Status: DISCONTINUED | OUTPATIENT
Start: 2022-03-21 | End: 2022-03-23

## 2022-03-21 RX ORDER — HYDROCHLOROTHIAZIDE 25 MG
12.5 TABLET ORAL DAILY
Refills: 0 | Status: DISCONTINUED | OUTPATIENT
Start: 2022-03-21 | End: 2022-03-23

## 2022-03-21 RX ADMIN — HEPARIN SODIUM 1000 UNIT(S)/HR: 5000 INJECTION INTRAVENOUS; SUBCUTANEOUS at 11:17

## 2022-03-21 RX ADMIN — TICAGRELOR 90 MILLIGRAM(S): 90 TABLET ORAL at 09:44

## 2022-03-21 RX ADMIN — Medication 162 MILLIGRAM(S): at 09:44

## 2022-03-21 RX ADMIN — Medication 12.5 MILLIGRAM(S): at 17:37

## 2022-03-21 RX ADMIN — Medication 25 GRAM(S): at 09:15

## 2022-03-21 RX ADMIN — Medication 162 MILLIGRAM(S): at 08:03

## 2022-03-21 RX ADMIN — ATORVASTATIN CALCIUM 80 MILLIGRAM(S): 80 TABLET, FILM COATED ORAL at 21:14

## 2022-03-21 RX ADMIN — LOSARTAN POTASSIUM 50 MILLIGRAM(S): 100 TABLET, FILM COATED ORAL at 09:18

## 2022-03-21 RX ADMIN — Medication 25 MILLIGRAM(S): at 17:37

## 2022-03-21 NOTE — ED PROVIDER NOTE - CLINICAL SUMMARY MEDICAL DECISION MAKING FREE TEXT BOX
Chest pain here bedside echo shows decreased EF approximate 25% which is different from baseline.  Initial EKG unchanged from previous initial troponin 0.22 seen by cardiology with repeat troponin showing 0.30 cardiology now requesting heparin drip Brilinta aspirin admission to the ccu

## 2022-03-21 NOTE — ED PROVIDER NOTE - ATTENDING CONTRIBUTION TO CARE
61-year-old male history above here for evaluation of chest pain.  Patient reports intermittent substernal chest pain with radiation to his jaw.  In addition patient reports he ran of his losartan and has not taken it for the past 2 days.  Patient has no tearing chest pain no numbness to his arms.  Agree with above exam    Impression  Chest pain here bedside echo shows decreased EF approximate 25% which is different from baseline.  Initial EKG unchanged from previous initial troponin 0.22 seen by cardiology with repeat troponin showing 0.30 cardiology now requesting heparin drip Brilinta aspirin admission to the ccu

## 2022-03-21 NOTE — ED ADULT TRIAGE NOTE - CHIEF COMPLAINT QUOTE
Patient brought in for chest pain radiating down left arm started 3 days ago intensifying this morning. hx of cabg and stented arteries

## 2022-03-21 NOTE — ED PROVIDER NOTE - PROGRESS NOTE DETAILS
Trop 0.22. Cardio bedside. Dr. Mikhail galvez. Estimated EF on bedside sono 25%, markedly decreased from previous echo -[facility] requesting repeat trop, coags, heparin drip, full dose aspirin, 90 of jaime pantoja Per CCU fellow ([id number]), pt can be admitted to CCU

## 2022-03-21 NOTE — H&P ADULT - NSHPPHYSICALEXAM_GEN_ALL_CORE
General: NAD, lying in bed comfortably  Mental status: AAOx3  Head:  Atraumatic, Normocephalic  Eyes: EOMI, conjunctiva and sclera clear  ENT: Moist mucous membranes  Neck: No JVD  Chest/lung: Clear to auscultation bilaterally; No wheezing or crackles  Heart: Regular rate and rhythm; No murmurs, rubs, or gallops  Abdomen: Bowel sounds present; Soft, Nontender, Nondistended  Extremities:  2+ Peripheral Pulses. No edema or cyanosis

## 2022-03-21 NOTE — CONSULT NOTE ADULT - ASSESSMENT
60 year old  male patient with PMH of CAD s/p CABG in 2008 (LIMA to LAD), DM, HTN, HLD, GERD, hiatal hernia, recent admission in August for STEMI s/p PCI to LCx and LM, presented with chest pain.    NSTEMI  HX CAD s/p CABG. Lcx and LM stents  Hx DMII, HTN, GERD    - EKG negative for acute ischemia  - Trop 0.22. Pt has no current chest pain  - Bedside Echo shows new regional wall motion abnormality and decreased EF  - Reload with Aspirin 325mg  - Pt took Brilinta 90mg, give another 90mg Brilinta  - Start heparin drip  - Atorvastatin 80mg and restart home BP medication  - NPO at this time for cath in afternoon  - Will need COVID swab  - Repeat Trop, if decreased then admit to telemetry, if cont to rise then CCU eval  - Plan discussed with Dr Shore

## 2022-03-21 NOTE — H&P ADULT - NSHPLABSRESULTS_GEN_ALL_CORE
LABS:  cret                        13.9   10.98 )-----------( 281      ( 21 Mar 2022 07:47 )             44.5     03-21    143  |  105  |  13  ----------------------------<  120<H>  4.5   |  28  |  1.0    Ca    10.5<H>      21 Mar 2022 07:47  Mg     1.8     03-21    TPro  7.1  /  Alb  4.3  /  TBili  0.9  /  DBili  x   /  AST  51<H>  /  ALT  37  /  AlkPhos  95  03-21    PT/INR - ( 21 Mar 2022 09:53 )   PT: 10.40 sec;   INR: 0.90 ratio         PTT - ( 21 Mar 2022 09:53 )  PTT:37.8 sec

## 2022-03-21 NOTE — H&P ADULT - NSHPREVIEWOFSYSTEMS_GEN_ALL_CORE
CONSTITUTIONAL: No weakness, fevers or chills, no weight loss   EYES/ENT: No visual changes;  No vertigo or throat pain   NECK: No pain or stiffness  RESPIRATORY: No cough, wheezing, hemoptysis; No shortness of breath  CARDIOVASCULAR: + chest pain no palpitations  GASTROINTESTINAL: No abdominal or epigastric pain. No nausea, vomiting, or hematemesis; No diarrhea or constipation. No melena or hematochezia.  GENITOURINARY: No dysuria, frequency or hematuria  NEUROLOGICAL: No numbness or weakness  All other review of systems is negative unless indicated above.

## 2022-03-21 NOTE — H&P ADULT - ATTENDING COMMENTS
NSTEMI, hx of CAD s/p multiple PCI   HTN uncontrolled  DL    Plan:  CCU monitoring  ACS treatment with DAPT, statin and BB  heparin drip follow up PTT  monitor kidney function and electrolytes  TTE  Plan for cath lindy by primary cardiologist

## 2022-03-21 NOTE — ED PROVIDER NOTE - OBJECTIVE STATEMENT
61y patient with PMH of CAD s/p CABG in 2008 (LIMA to LAD), DM, HTN, HLD, GERD, hiatal hernia,  PCI to LCx and LM cardiologist is Mikhail presenting with chest pain, substernal, intermittent xfew days. Occurs at rest, radiates to right jaw, r shoulder, and back. Took advil prior to arrival, currently with no pain. No sob. No f/c/n/v. No diaphoresis. No weakness. Pt ran out of his losartan and has not taken it for the last few days.

## 2022-03-21 NOTE — H&P ADULT - ASSESSMENT
61 M with Hx of CAD s/p CABG in 2008 (LIMA to LAD), STEMI s/p PCI to LCx and LM, DM, HTN, HLD, GERD, hiatal hernia presents with 2 days of chest pain, found to have elevated troponin and HTN urgency/emergency.     IMPRESSION:  UA/NSTEMI  Hx of CAD s/p CABG and PCI to LM and LCx  HTN urgency vs emergency  Hx HTN, DM, GERD    PLAN:    CNS: Avoid CNS Depressants     HEENT: Oral care. Aspiration precautions     PULMONARY: HOB @ 45. Monitor Pulse Ox. Keep > 93%. Supplement as needed.    CARDIOVASCULAR:   - Plan for cath today/tomorrow  - Keep NPO  - Heparin drip; follow PTT  - Loaded with ASA, Brilinta  - Resume home meds HCTZ, losartan, Lopressor, Lipitor  - TTE    GI: GI prophylaxis. NPO fpr now     RENAL: Avoid nephrotoxic agents     INFECTIOUS DISEASE: No abx    HEMATOLOGICAL: Heparin drip    ENDOCRINE: Monitor FS    MUSCULOSKELETAL: Ambulate as tolerated     DISPOSITION:  CCU 61 M with Hx of CAD s/p CABG in 2008 (LIMA to LAD), STEMI s/p PCI to LCx and LM, DM, HTN, HLD, GERD, hiatal hernia presents with 2 days of chest pain, found to have elevated troponin and HTN urgency/emergency.     IMPRESSION:  NSTEMI  Hx of CAD s/p CABG and PCI to LM and LCx  HTN urgency vs emergency  Hx HTN, DM, GERD    PLAN:    CNS: Avoid CNS Depressants     HEENT: Oral care. Aspiration precautions     PULMONARY: HOB @ 45. Monitor Pulse Ox. Keep > 93%. Supplement as needed.    CARDIOVASCULAR:   - Plan for cath today/tomorrow  - Keep NPO  - Heparin drip; follow PTT  - Loaded with ASA, Brilinta  - Resume home meds HCTZ, losartan, Lopressor, Lipitor  - TTE    GI: GI prophylaxis. NPO fpr now     RENAL: Avoid nephrotoxic agents     INFECTIOUS DISEASE: No abx    HEMATOLOGICAL: Heparin drip    ENDOCRINE: Monitor FS    MUSCULOSKELETAL: Ambulate as tolerated     DISPOSITION:  CCU

## 2022-03-21 NOTE — CONSULT NOTE ADULT - SUBJECTIVE AND OBJECTIVE BOX
HPI:  60 year old  male patient with PMH of CAD s/p CABG in 2008 (LIMA to LAD), DM, HTN, HLD, GERD, hiatal hernia, recent admission in August for STEMI s/p PCI to LCx and LM, presented with chest pain. Pt reports L sided chest pain intermittent for the past 2 weeks similiar to prior to CABG. Today he had crushing chest pain radiating to the back in am associated with SOB. Pt denies current chest pain, palpitations, diaphoresis. Pt reports he has not been complaint with Aspirin or Brilinta    On presentation /104, EKG shows NSR with no acute ischemic changes, Trop 0.22. Pt denies any chest pain at this time. Bedside echo shows new decreased EF 25-30%    PAST MEDICAL & SURGICAL HISTORY  GERD (gastroesophageal reflux disease)    High cholesterol    Chronic gastric ulcer without hemorrhage and without perforation    Ulcer of esophagus    CAD S/P percutaneous coronary angioplasty    DM (diabetes mellitus)    Hiatal hernia with GERD    Coronary artery disease involving other coronary artery bypass graft, angina presence unspecified    Cardiac arrhythmia  bypass surgery 9 years ago    H/O right coronary artery stent placement  August 17, 2020        FAMILY HISTORY:  FAMILY HISTORY:  FH: CAD (coronary artery disease) (Grandparent)        SOCIAL HISTORY:  []smoker Ex smoker  []Alcohol denies  []Drug denies    ALLERGIES:  No Known Allergies      MEDICATIONS:  MEDICATIONS  (STANDING):    MEDICATIONS  (PRN):      HOME MEDICATIONS:  Home Medications:  Janumet 50 mg-500 mg oral tablet: 1 tab(s) orally 2 times a day (16 Aug 2020 16:34)      VITALS:   T(F): 96.5 (03-21 @ 07:22), Max: 96.5 (03-21 @ 07:22)  HR: 86 (03-21 @ 08:08) (84 - 86)  BP: 160/90 (03-21 @ 08:08) (160/90 - 218/104)  BP(mean): --  RR: 17 (03-21 @ 07:22) (17 - 17)  SpO2: 100% (03-21 @ 07:22) (100% - 100%)    I&O's Summary      REVIEW OF SYSTEMS:  CONSTITUTIONAL: No weakness, fevers or chills  EYES: No visual changes  ENT: No vertigo or throat pain   NECK: No pain or stiffness  RESPIRATORY: shortness of breath  CARDIOVASCULAR: Chest pain  GASTROINTESTINAL: No abdominal or epigastric pain. No nausea, vomiting, or hematemesis; No diarrhea or constipation. No melena or hematochezia.  GENITOURINARY: No dysuria, frequency or hematuria  NEUROLOGICAL: No numbness or weakness  SKIN: No itching, no rashes  MSK: no    PHYSICAL EXAM:  NEURO: patient is awake , alert and oriented  GEN: Not in acute distress  NECK: no thyroid enlargement, no JVD  LUNGS: Clear to auscultation bilaterally   CARDIOVASCULAR: S1/S2 present, RRR , no murmurs or rubs, no carotid bruits,  + PP bilaterally  ABD: Soft, non-tender, non-distended, +BS  EXT: No LAURA  SKIN: Intact    LABS:                        13.9   10.98 )-----------( 281      ( 21 Mar 2022 07:47 )             44.5     03-21    143  |  105  |  13  ----------------------------<  120<H>  4.5   |  28  |  1.0    Ca    10.5<H>      21 Mar 2022 07:47  Mg     1.8     03-21    TPro  7.1  /  Alb  4.3  /  TBili  0.9  /  DBili  x   /  AST  51<H>  /  ALT  37  /  AlkPhos  95  03-21      Troponin T, Serum: 0.22 ng/mL *HH* (03-21-22 @ 07:47)    CARDIAC MARKERS ( 21 Mar 2022 07:47 )  x     / 0.22 ng/mL / x     / x     / x            Troponin trend:    Serum Pro-Brain Natriuretic Peptide: 1288 pg/mL (03-21-22 @ 07:47)          RADIOLOGY:  -CXR:  -TTE:  < from: Transthoracic Echocardiogram (08.17.20 @ 09:51) >   1. Left ventricular ejection fraction, by visual estimation, is 55 to 60%.   2. Normal left ventricular size and wall thicknesses, with normal systolic and diastolic function.   3. Mild mitral valve regurgitation.   4. LA volume Index is 29.4 ml/m² ml/m2.    < end of copied text >    -CCTA:  -STRESS TEST:  -CATHETERIZATION:  There is significant triple vessel coronary artery disease and left main disease.  of distal RCA. Patent LIMA graft to LAD. Successful PCI of proximal LCX into OM2 (culprit vessel) and severe LM disease.      ECG:  NSR with no sign of acute ischemia    TELEMETRY EVENTS:

## 2022-03-21 NOTE — H&P ADULT - HISTORY OF PRESENT ILLNESS
61 M with Hx of CAD s/p CABG in 2008 (LIMA to LAD), STEMI s/p PCI to LCx and LM, DM, HTN, HLD, GERD, hiatal hernia presents with 2 days of chest pain that "feels like gas." The pain is substernal, radiating to L arm and shoulder blade, neck, occurs at rest and not worsened by exertion. He can walk up to 4 miles with no significant limitation. He has not been compliant with his medications including aspirin and brilinta. He denies SOB, cough, palpitations fever.    ED VS: T(F): , Max: 96.5 (03-21-22 @ 07:22)  HR:  (57 - 86)  BP:  (152/74 - 218/104)  RR: 20  SpO2:  (97% - 100%)      Labs: trop 0.22 > 0.30, BNP 1288. EKG with no acute ischemic changes  Imaging: RUQ US Sludge within the gallbladder. Benign cyst within the right lobe of the liver.    Pt received ASA loading and Brilinta, started on heparin for suspected UA/NSTEMI. Plan for cath today. Received losartan and BP improved. Admit to CCU.   61 M with Hx of CAD s/p CABG in 2008 (LIMA to LAD), STEMI s/p PCI to LCx and LM, DM, HTN, HLD, GERD, hiatal hernia presents with 2 days of chest pain that "feels like gas." The pain is substernal, radiating to L arm and shoulder blade, neck, occurs at rest and not worsened by exertion. He can walk up to 4 miles with no significant limitation. He has not been compliant with his medications including aspirin and brilinta. He denies SOB, cough, palpitations fever.    ED VS: T(F): , Max: 96.5 (03-21-22 @ 07:22)  HR:  (57 - 86)  BP:  (152/74 - 218/104)  RR: 20  SpO2:  (97% - 100%)      Labs: trop 0.22 > 0.30, BNP 1288. EKG with no acute ischemic changes  Imaging: RUQ US Sludge within the gallbladder. Benign cyst within the right lobe of the liver.    Pt received ASA loading and Brilinta, started on heparin for NSTEMI. Plan for cath today. Received losartan and BP improved. Admit to CCU.

## 2022-03-22 LAB
APTT BLD: 39.8 SEC — HIGH (ref 27–39.2)
GLUCOSE BLDC GLUCOMTR-MCNC: 117 MG/DL — HIGH (ref 70–99)
GLUCOSE BLDC GLUCOMTR-MCNC: 138 MG/DL — HIGH (ref 70–99)
GLUCOSE BLDC GLUCOMTR-MCNC: 90 MG/DL — SIGNIFICANT CHANGE UP (ref 70–99)

## 2022-03-22 PROCEDURE — 93306 TTE W/DOPPLER COMPLETE: CPT | Mod: 26

## 2022-03-22 RX ORDER — SODIUM CHLORIDE 9 MG/ML
1200 INJECTION INTRAMUSCULAR; INTRAVENOUS; SUBCUTANEOUS
Refills: 0 | Status: DISCONTINUED | OUTPATIENT
Start: 2022-03-22 | End: 2022-03-23

## 2022-03-22 RX ADMIN — SODIUM CHLORIDE 100 MILLILITER(S): 9 INJECTION INTRAMUSCULAR; INTRAVENOUS; SUBCUTANEOUS at 17:43

## 2022-03-22 RX ADMIN — Medication 25 MILLIGRAM(S): at 09:48

## 2022-03-22 RX ADMIN — TICAGRELOR 90 MILLIGRAM(S): 90 TABLET ORAL at 17:36

## 2022-03-22 RX ADMIN — TICAGRELOR 90 MILLIGRAM(S): 90 TABLET ORAL at 07:25

## 2022-03-22 RX ADMIN — Medication 81 MILLIGRAM(S): at 07:25

## 2022-03-22 RX ADMIN — Medication 25 MILLIGRAM(S): at 17:36

## 2022-03-22 RX ADMIN — ATORVASTATIN CALCIUM 80 MILLIGRAM(S): 80 TABLET, FILM COATED ORAL at 21:42

## 2022-03-22 RX ADMIN — PANTOPRAZOLE SODIUM 40 MILLIGRAM(S): 20 TABLET, DELAYED RELEASE ORAL at 10:05

## 2022-03-22 RX ADMIN — LOSARTAN POTASSIUM 50 MILLIGRAM(S): 100 TABLET, FILM COATED ORAL at 09:48

## 2022-03-22 RX ADMIN — Medication 12.5 MILLIGRAM(S): at 09:48

## 2022-03-22 RX ADMIN — SODIUM CHLORIDE 100 MILLILITER(S): 9 INJECTION INTRAMUSCULAR; INTRAVENOUS; SUBCUTANEOUS at 10:53

## 2022-03-22 RX ADMIN — SODIUM CHLORIDE 75 MILLILITER(S): 9 INJECTION INTRAMUSCULAR; INTRAVENOUS; SUBCUTANEOUS at 10:05

## 2022-03-22 NOTE — ED ADULT NURSE REASSESSMENT NOTE - NS ED NURSE REASSESS COMMENT FT1
pt upgraded from main to crit area, report received from previous RN  No c/o chest pain/SOB at this time  Pt remains on heparin gtt   Continuous cardiac monitoring, fall precautions in place

## 2022-03-22 NOTE — CHART NOTE - NSCHARTNOTEFT_GEN_A_CORE
Pre cath note:    Indication:  [ ] STEMI                [ x] NSTEMI                 [ ] Acute coronary syndrome                     [ ]Unstable Angina   [ ] high risk  [ ] intermediate risk  [ ] low risk                     [ ] Stable Angina     non-invasive testing:                          Date:                     result: [ ] high risk  [ ] intermediate risk  [ ] low risk    Anti- Anginal medications:                    [ ] not used                       [ x] used                   [ ] not used but strong indication not to use    Ejection Fraction                   [ ] <29            [ ] 30-39%   [ ] 40-49%     [ ]>50%                            Heart Failure NYHA Class (1-4):                       CHF                   [ ] Active (within last 14 days on meds   [ ] Chronic (on meds but no exacerbation)    COPD                   [ ] mild (on chronic bronchodilators)  [ ] moderate (on chronic steroid therapy)      [ ] severe (indication for home O2 or PACO2 >50)    Other risk factors:                       [ ] Previous MI                     [ ] CVA/ stroke                    [ ] carotid stent/ CEA                    [ ] PVD/PAD- (arterial aneurysm, non-palpable pulses, tortuous vessel with inability to insert catheter, infra-renal dissection, renal or subclavian artery stenosis)                    [x ] diabetic                    [ x] previous CABG                    [ ] Renal Failure                           13.9   10.98 )-----------( 281      ( 21 Mar 2022 07:47 )             44.5     03-21    143  |  105  |  13  ----------------------------<  120<H>  4.5   |  28  |  1.0    Ca    10.5<H>      21 Mar 2022 07:47  Mg     1.8     03-21    TPro  7.1  /  Alb  4.3  /  TBili  0.9  /  DBili  x   /  AST  51<H>  /  ALT  37  /  AlkPhos  95  03-21                         -Patient Schedule for LHC/PCI tomorrow   -Keep NPO after midnight  -Hold Anticoagulation prior procedure   -Start IV Fluids NS as ordered
CCU DOWNGRADE NOTE:    61y Male transferred to floor from CCU    Patient is a 61y old Male who presents with a chief complaint of chest pain    The patient is currently admitted for the primary diagnosis of NSTEMI (non-ST elevation myocardial infarction)      The patient was admitted to the unit for (1) Days.    The patient was never intubated and was never on pressors     Indwelling vascular catheters: peripheral IV    Urinary Catheter: none    Disposition: Med telemetry    Code Status: full    CCU COURSE OF EVENTS: 61 M with Hx of CAD s/p CABG in 2008 (LIMA to LAD), STEMI s/p PCI to LCx and LM, DM, HTN, HLD, GERD, hiatal hernia presents with 2 days of chest pain that "feels like gas." The pain is substernal, radiating to L arm and shoulder blade, neck, occurs at rest and not worsened by exertion. He can walk up to 4 miles with no significant limitation. He has not been compliant with his medications including aspirin and brilinta. He denies SOB, cough, palpitations fever.    ED VS: T(F): , Max: 96.5 (03-21-22 @ 07:22)  HR:  (57 - 86)  BP:  (152/74 - 218/104)  RR: 20  SpO2:  (97% - 100%)    Labs: trop 0.22 > 0.30, BNP 1288. EKG with no acute ischemic changes  Imaging: RUQ US Sludge within the gallbladder. Benign cyst within the right lobe of the liver.    Pt received ASA loading and Brilinta, started on heparin for NSTEMI. Plan for cath today. Received losartan and BP improved.s/p cath today    -------------------------------------------------------------------------------------------    #NSTEMI  - s/p cath 3/22:  double vessel coronary artery disease.  Successful PCI of Proximal Circumflex/OM1  - c/w ASA, Brilinta, Statin, BB and risk factor modification     #CAD s/p CABG, s/p PCI in the past  - c/w ASA, Brilinta, Statin, BB and risk factor modification     #Htn  - c/w losartan , metoprolol    -------------------------------------------------------------------------------------------    Current workup in progress:   - telemonitoring     SIGN OUT AT 03-22-22 @ 11:33 GIVEN TO:   ([id number])
PRE-OP DIAGNOSIS:  NSTEMI    PROCEDURE: Coronary angiogram, [facility], PCI      Attending:  Vega Nathan MD [facility]    Interventional Fellow: Dr. Black       Consent:      [x] Patient     [] Family Member     []  Used        Anesthesia:     [] General     [x] Sedation     [x] Local        Access & Closure:     [x] 6 Fr Right Femoral Artery -> perclosed      IV Contrast: 175  mL        Intervention: PTCA, IVUS IVONE x 1 to proximal-mid circumflex, OM1      Implants:   Resolute Dearing 3.5 x 18 stent to proximal LCx, 3.0 x 34 stent to OM1      FINDINGS:     Coronary circulation    RCA, dominant : severe diffuse disease    LCx : proximal - 60-70 stenosis, OM1 99% ISR    LAD, tortuous : proximal - 95% stenosis, mid - , distal - supplied by LIMA    LM, short : mild ISR    LIMA: patent           ESTIMATED BLOOD LOSS: < 30 mL        CONDITION:     [x] Good     [] Fair     [] Critical        SPECIMEN REMOVED: N/A       POST-OP DIAGNOSIS:    There is double vessel coronary artery disease.  Successful PCI of Proximal Circumflex/OM1(AUC score 8).        PLAN OF CARE:   Tele monitoring   IV   cc/hr x 12 hrs   Medical therapy with ASA, Brilinta, Statin, BB and risk factor modification   Transfer back to floor

## 2022-03-22 NOTE — ASU PATIENT PROFILE, ADULT - FALL HARM RISK - HARM RISK INTERVENTIONS

## 2022-03-22 NOTE — PATIENT PROFILE ADULT - FALL HARM RISK - RISK INTERVENTIONS
Assistance OOB with selected safe patient handling equipment/Assistance with ambulation/Communicate Fall Risk and Risk Factors to all staff, patient, and family/Monitor gait and stability/Reinforce activity limits and safety measures with patient and family/Sit up slowly, dangle for a short time, stand at bedside before walking/Use of alarms - bed, chair and/or voice tab/Visual Cue: Yellow wristband/Bed in lowest position, wheels locked, appropriate side rails in place/Call bell, personal items and telephone in reach/Instruct patient to call for assistance before getting out of bed or chair/Non-slip footwear when patient is out of bed/[place] to call system/Physically safe environment - no spills, clutter or unnecessary equipment/Purposeful Proactive Rounding/Room/bathroom lighting operational, light cord in reach

## 2022-03-23 ENCOUNTER — TRANSCRIPTION ENCOUNTER (OUTPATIENT)
Age: 62
End: 2022-03-23

## 2022-03-23 VITALS
SYSTOLIC BLOOD PRESSURE: 143 MMHG | RESPIRATION RATE: 18 BRPM | TEMPERATURE: 98 F | HEART RATE: 73 BPM | DIASTOLIC BLOOD PRESSURE: 74 MMHG

## 2022-03-23 LAB
A1C WITH ESTIMATED AVERAGE GLUCOSE RESULT: 6.1 % — HIGH (ref 4–5.6)
ALBUMIN SERPL ELPH-MCNC: 3.9 G/DL — SIGNIFICANT CHANGE UP (ref 3.5–5.2)
ALP SERPL-CCNC: 92 U/L — SIGNIFICANT CHANGE UP (ref 30–115)
ALT FLD-CCNC: 28 U/L — SIGNIFICANT CHANGE UP (ref 0–41)
ANION GAP SERPL CALC-SCNC: 7 MMOL/L — SIGNIFICANT CHANGE UP (ref 7–14)
AST SERPL-CCNC: 67 U/L — HIGH (ref 0–41)
BILIRUB SERPL-MCNC: 1.7 MG/DL — HIGH (ref 0.2–1.2)
BUN SERPL-MCNC: 12 MG/DL — SIGNIFICANT CHANGE UP (ref 10–20)
CALCIUM SERPL-MCNC: 9.2 MG/DL — SIGNIFICANT CHANGE UP (ref 8.5–10.1)
CHLORIDE SERPL-SCNC: 105 MMOL/L — SIGNIFICANT CHANGE UP (ref 98–110)
CHOLEST SERPL-MCNC: 148 MG/DL — SIGNIFICANT CHANGE UP
CO2 SERPL-SCNC: 29 MMOL/L — SIGNIFICANT CHANGE UP (ref 17–32)
CREAT SERPL-MCNC: 1.1 MG/DL — SIGNIFICANT CHANGE UP (ref 0.7–1.5)
EGFR: 77 ML/MIN/1.73M2 — SIGNIFICANT CHANGE UP
ESTIMATED AVERAGE GLUCOSE: 128 MG/DL — HIGH (ref 68–114)
GLUCOSE BLDC GLUCOMTR-MCNC: 104 MG/DL — HIGH (ref 70–99)
GLUCOSE BLDC GLUCOMTR-MCNC: 115 MG/DL — HIGH (ref 70–99)
GLUCOSE SERPL-MCNC: 103 MG/DL — HIGH (ref 70–99)
HCT VFR BLD CALC: 43 % — SIGNIFICANT CHANGE UP (ref 42–52)
HDLC SERPL-MCNC: 33 MG/DL — LOW
HGB BLD-MCNC: 13.2 G/DL — LOW (ref 14–18)
LIPID PNL WITH DIRECT LDL SERPL: 92 MG/DL — SIGNIFICANT CHANGE UP
MAGNESIUM SERPL-MCNC: 2.2 MG/DL — SIGNIFICANT CHANGE UP (ref 1.8–2.4)
MCHC RBC-ENTMCNC: 26.5 PG — LOW (ref 27–31)
MCHC RBC-ENTMCNC: 30.7 G/DL — LOW (ref 32–37)
MCV RBC AUTO: 86.3 FL — SIGNIFICANT CHANGE UP (ref 80–94)
NON HDL CHOLESTEROL: 115 MG/DL — SIGNIFICANT CHANGE UP
NRBC # BLD: 0 /100 WBCS — SIGNIFICANT CHANGE UP (ref 0–0)
PLATELET # BLD AUTO: 264 K/UL — SIGNIFICANT CHANGE UP (ref 130–400)
POTASSIUM SERPL-MCNC: 3.8 MMOL/L — SIGNIFICANT CHANGE UP (ref 3.5–5)
POTASSIUM SERPL-SCNC: 3.8 MMOL/L — SIGNIFICANT CHANGE UP (ref 3.5–5)
PROT SERPL-MCNC: 6.3 G/DL — SIGNIFICANT CHANGE UP (ref 6–8)
RBC # BLD: 4.98 M/UL — SIGNIFICANT CHANGE UP (ref 4.7–6.1)
RBC # FLD: 13.6 % — SIGNIFICANT CHANGE UP (ref 11.5–14.5)
SODIUM SERPL-SCNC: 141 MMOL/L — SIGNIFICANT CHANGE UP (ref 135–146)
TRIGL SERPL-MCNC: 154 MG/DL — HIGH
WBC # BLD: 10.11 K/UL — SIGNIFICANT CHANGE UP (ref 4.8–10.8)
WBC # FLD AUTO: 10.11 K/UL — SIGNIFICANT CHANGE UP (ref 4.8–10.8)

## 2022-03-23 PROCEDURE — 99239 HOSP IP/OBS DSCHRG MGMT >30: CPT

## 2022-03-23 PROCEDURE — 93010 ELECTROCARDIOGRAM REPORT: CPT

## 2022-03-23 RX ORDER — METOPROLOL TARTRATE 50 MG
1 TABLET ORAL
Qty: 0 | Refills: 0 | DISCHARGE
Start: 2022-03-23

## 2022-03-23 RX ORDER — HYDROCHLOROTHIAZIDE 25 MG
1 TABLET ORAL
Qty: 0 | Refills: 0 | DISCHARGE
Start: 2022-03-23

## 2022-03-23 RX ORDER — ASPIRIN/CALCIUM CARB/MAGNESIUM 324 MG
1 TABLET ORAL
Qty: 0 | Refills: 0 | DISCHARGE
Start: 2022-03-23

## 2022-03-23 RX ORDER — ATORVASTATIN CALCIUM 80 MG/1
1 TABLET, FILM COATED ORAL
Qty: 0 | Refills: 0 | DISCHARGE
Start: 2022-03-23

## 2022-03-23 RX ORDER — TICAGRELOR 90 MG/1
1 TABLET ORAL
Qty: 60 | Refills: 0
Start: 2022-03-23 | End: 2022-04-21

## 2022-03-23 RX ORDER — TICAGRELOR 90 MG/1
1 TABLET ORAL
Qty: 0 | Refills: 0 | DISCHARGE
Start: 2022-03-23

## 2022-03-23 RX ORDER — LOSARTAN POTASSIUM 100 MG/1
1 TABLET, FILM COATED ORAL
Qty: 0 | Refills: 0 | DISCHARGE
Start: 2022-03-23

## 2022-03-23 RX ORDER — TICAGRELOR 90 MG/1
1 TABLET ORAL
Qty: 180 | Refills: 3
Start: 2022-03-23 | End: 2023-03-17

## 2022-03-23 RX ADMIN — TICAGRELOR 90 MILLIGRAM(S): 90 TABLET ORAL at 05:40

## 2022-03-23 RX ADMIN — LOSARTAN POTASSIUM 50 MILLIGRAM(S): 100 TABLET, FILM COATED ORAL at 05:39

## 2022-03-23 RX ADMIN — PANTOPRAZOLE SODIUM 40 MILLIGRAM(S): 20 TABLET, DELAYED RELEASE ORAL at 05:39

## 2022-03-23 RX ADMIN — Medication 25 MILLIGRAM(S): at 05:39

## 2022-03-23 RX ADMIN — Medication 12.5 MILLIGRAM(S): at 05:40

## 2022-03-23 RX ADMIN — Medication 81 MILLIGRAM(S): at 12:24

## 2022-03-23 NOTE — DISCHARGE NOTE PROVIDER - CARE PROVIDER_API CALL
Vega ROSE)  Cardiology; Internal Medicine  [address] [place] [address]  Phone: [phone]  Fax: [phone]  Follow Up Time: 1 week    Conner Warner)  Internal Medicine  [address] [place] [address]  Phone: [phone]  Fax: [phone]  Follow Up Time: 1 week

## 2022-03-23 NOTE — DISCHARGE NOTE NURSING/CASE MANAGEMENT/SOCIAL WORK - PATIENT PORTAL LINK FT
You can access the FollowMyHealth Patient Portal offered by [facility] by registering at the following website: [email or web address]. By joining [employer]’s FollowMyHealth portal, you will also be able to view your health information using other applications (apps) compatible with our system.

## 2022-03-23 NOTE — PROGRESS NOTE ADULT - SUBJECTIVE AND OBJECTIVE BOX
Cardiology Follow up    KATH MCCOLLUM   61y Male  PAST MEDICAL & SURGICAL HISTORY:  GERD (gastroesophageal reflux disease)    High cholesterol    Chronic gastric ulcer without hemorrhage and without perforation    Ulcer of esophagus    CAD S/P percutaneous coronary angioplasty    DM (diabetes mellitus)    Hiatal hernia with GERD    Coronary artery disease involving other coronary artery bypass graft, angina presence unspecified    Cardiac arrhythmia  bypass surgery 9 years ago    H/O right coronary artery stent placement  August 17, 2020       HPI:  61 M with Hx of CAD s/p CABG in 2008 (LIMA to LAD), STEMI s/p PCI to LCx and LM, DM, HTN, HLD, GERD, hiatal hernia presents with 2 days of chest pain that "feels like gas." The pain is substernal, radiating to L arm and shoulder blade, neck, occurs at rest and not worsened by exertion. He can walk up to 4 miles with no significant limitation. He has not been compliant with his medications including aspirin and brilinta. He denies SOB, cough, palpitations fever.    ED VS: T(F): , Max: 96.5 (03-21-22 @ 07:22)  HR:  (57 - 86)  BP:  (152/74 - 218/104)  RR: 20  SpO2:  (97% - 100%)    Labs: trop 0.22 > 0.30, BNP 1288. EKG with no acute ischemic changes  Imaging: RUQ US Sludge within the gallbladder. Benign cyst within the right lobe of the liver.    Pt received ASA loading and Brilinta, started on heparin for NSTEMI. Plan for cath today. Received losartan and BP improved. Admit to CCU.   (21 Mar 2022 15:16)    Allergies    No Known Allergies    Intolerances    Patient seen and examined at bedside. No acute events overnight.  Patient without complaints. Pt ambulated without issues/symptoms  Denies CP, SOB, palpitations, or dizziness  NSVT 3 beats and bradycardiac episodes noted on telemetry overnight    Vital Signs Last 24 Hrs  T(C): 36.5 (23 Mar 2022 13:08), Max: 36.9 (22 Mar 2022 20:29)  T(F): 97.7 (23 Mar 2022 13:08), Max: 98.5 (22 Mar 2022 20:29)  HR: 73 (23 Mar 2022 13:08) (55 - 73)  BP: 143/74 (23 Mar 2022 13:08) (132/58 - 152/64)  BP(mean): --  RR: 18 (23 Mar 2022 13:08) (18 - 20)  SpO2: 100% (22 Mar 2022 20:29) (99% - 100%)    MEDICATIONS  (STANDING):  aspirin enteric coated 81 milliGRAM(s) Oral daily  atorvastatin 80 milliGRAM(s) Oral at bedtime  hydrochlorothiazide 12.5 milliGRAM(s) Oral daily  losartan 50 milliGRAM(s) Oral daily  metoprolol tartrate 25 milliGRAM(s) Oral two times a day  pantoprazole    Tablet 40 milliGRAM(s) Oral before breakfast  sodium chloride 0.9%. 1200 milliLiter(s) (100 mL/Hr) IV Continuous <Continuous>  ticagrelor 90 milliGRAM(s) Oral every 12 hours    MEDICATIONS  (PRN):    REVIEW OF SYSTEMS:          All negative except as mentioned in HPI    PHYSICAL EXAM:           CONSTITUTIONAL: Well-developed; well-nourished; in no acute distress  	SKIN: warm, dry  	HEAD: Normocephalic; atraumatic  	EYES: PERRL.  	ENT: No nasal discharge, airway clear, mucous membranes moist  	NECK: Supple; non tender.  	CARD: +S1, +S2, no murmurs, gallops, or rubs. Regular rate and rhythm    	RESP: No wheezes, rales or rhonchi. CTA B/L  	ABD: soft ntnd, + BS x 4 quadrants  	EXT: moves all extremities,  no clubbing, cyanosis or edema  	NEURO: Alert and oriented x3, no focal deficits          PSYCH: Cooperative, appropriate          VASCULAR:  + Rad / + PTs / +  DPs          EXTREMITY:             Right Groin: dressing removed, access site soft, no hematoma, no pain, + pulses, no sign of infection, no numbness  	            ECG:   < from: 12 Lead ECG (03.23.22 @ 09:19) >  Ventricular Rate 55 BPM    Atrial Rate 55 BPM    P-R Interval 142 ms    QRS Duration 120 ms    Q-T Interval 472 ms    QTC Calculation(Bazett) 451 ms    P Axis 26 degrees    R Axis -40 degrees    T Axis 172 degrees    Diagnosis Line Sinus bradycardia  Left axis deviation  Inferior infarct , age undetermined  ST & T wave abnormality, consider lateral ischemia  Abnormal ECG    Confirmed by JO RACHEL MD ([id number]) on 3/23/2022 10:26:27 AM                                                                                                               2D ECHO:  < from: TTE Echo Complete w/o Contrast w/ Doppler (03.22.22 @ 11:30) >  Summary:  1. Multiple left ventricular regional wall motion abnormalities exist.   See wall motion findings.   2. LV Ejection Fraction by Baldwin's Method with a biplane EF of 32 %.   3. Dilated cardiomyopathy.   4. Moderately increased left ventricular internal cavity size.   5. Spectral Doppler shows impaired relaxation pattern of left   ventricular myocardial filling (Grade I diastolic dysfunction).   6. Moderately enlarged left atrium.   7. Normal right atrial size.   8. Mild mitral annular calcification.   9. Mild thickening of the anterior and posterior mitral valve leaflets.  10. No evidence of mitral valve regurgitation.  11. Mild tricuspid regurgitation.    LABS:                        13.2   10.11 )-----------( 264      ( 23 Mar 2022 11:44 )             43.0     03-23    141  |  105  |  12  ----------------------------<  103<H>  3.8   |  29  |  1.1    Ca    9.2      23 Mar 2022 11:44  Mg     2.2     03-23    TPro  6.3  /  Alb  3.9  /  TBili  1.7<H>  /  DBili  x   /  AST  67<H>  /  ALT  28  /  AlkPhos  92  03-23    CARDIAC MARKERS ( 21 Mar 2022 20:52 )  x     / 0.59 ng/mL / x     / x     / x        Magnesium, Serum: 2.2 mg/dL [1.8 - 2.4] (03-23-22 @ 11:44)  LIVER FUNCTIONS - ( 23 Mar 2022 11:44 )  Alb: 3.9 g/dL / Pro: 6.3 g/dL / ALK PHOS: 92 U/L / ALT: 28 U/L / AST: 67 U/L / GGT: x             A/P:  I discussed the case with Cardiologist Dr. Mone Ferrari and recommend the following:    S/P PCI:   Access & Closure:     [x] 6 Fr Right Femoral Artery -> perclosed      IV Contrast: 175  mL      Intervention: PTCA, IVUS IVONE x 1 to proximal-mid circumflex, OM1    Implants:   Resolute [place] 3.5 x 18 stent to proximal LCx, 3.0 x 34 stent to OM1      FINDINGS:     Coronary circulation    RCA, dominant : severe diffuse disease    LCx : proximal - 60-70 stenosis, OM1 99% ISR    LAD, tortuous : proximal - 95% stenosis, mid - , distal - supplied by LIMA    LM, short : mild ISR    LIMA: patent                        LifeVest - will be addressed as OP                   Continue DAPT ( Aspirin 81 mg PO Daily and Brilinta 90 mg PO q12hr ), ARB, B-Blocker, Statin Therapy, Pantoprazole, HCTZ                   C/S SW/Financial screening                    As per patient he has a gap in insurance coverage for 3-4 week.                     Patient given samples of Brilinta to take it home                   Patient agreeing to take DAPT for at least one year or as directed by cardiologist                    Pt given instructions on importance of taking antiplatelet medication or risk acute stent thrombosis/death                   Post cath instructions, access site care and activity restrictions reviewed with patient                     Discussed with patient to return to hospital if experience chest pain, shortness breath, dizziness and site bleeding                   Aggressive risk factor modification, diet counseling, smoking cessation discussed with patient                       Can discharge patient from cardiac standpoint after ambulating without symptoms and access site wnl, ECG and blood work reviewed                    Benefits of Cardiac Rehab discussed with patient, All documents sent to [facility]. Patient instructed to call and make first                               appointment after first f/u visit with Cardiologist                    Follow up with Cardiology Dr. Mone Ferrari in two weeks. Instructed to call and make an appointment                      Discharge instructions as follows, when ready to d/c:                   - Continue medical regimen as prescribed to prevent chest pain                   - Continue dual anti-platelet therapy, beta blocker, statin, arb, ppi, hctz                   - If you are diabetic and taking medication containing Metformin, do not take them for 48 hours after the procedure                   - Instructed to call 911 if chest pain, shortness of breath or bleeding from access site                   - No heavy lifting >10lbs x 1 week                   - No driving x 24 hours                   - No baths, swimming pools x 1 week, may shower                   - Low sodium low fat low cholesterol diet                   - Follow-up with Cardiologist in 2 weeks after discharge

## 2022-03-23 NOTE — DISCHARGE NOTE PROVIDER - PROVIDER TOKENS
PROVIDER:[TOKEN:[[id number]],FOLLOWUP:[1 week]],PROVIDER:[TOKEN:[[id number]],FOLLOWUP:[1 week]]

## 2022-03-23 NOTE — DISCHARGE NOTE NURSING/CASE MANAGEMENT/SOCIAL WORK - NSDCPEFALRISK_GEN_ALL_CORE
For information on Fall & Injury Prevention, visit: [email or web address] OR  [email or web address] OR  https://www.cdc.gov/steadi/patient.html

## 2022-03-23 NOTE — DISCHARGE NOTE PROVIDER - ATTENDING DISCHARGE PHYSICAL EXAMINATION:
patient seen and examined independently on morning rounds for the first time today, chart reviewed and discussed with medicine resident and agree with the above discharge note    time spendt on discharge >30 minutes including coordination of discharge care    no current cp or sob- pcp Dr. Warner---    discharge home today- plan for f/u with outpatient pcp & cardio

## 2022-03-23 NOTE — DISCHARGE NOTE PROVIDER - NSDCCPCAREPLAN_GEN_ALL_CORE_FT
PRINCIPAL DISCHARGE DIAGNOSIS  Diagnosis: NSTEMI (non-ST elevation myocardial infarction)  Assessment and Plan of Treatment: You have had a heart attack (acute myocardial infarction). A heart attack occurs when a vessel that sends blood to your heart suddenly becomes blocked. This causes your heart not to work as well as it should. You were taken for a cardic catheterization which found some blockages in two arteries. They placed stents in those arteries and your chest pain improved.

## 2022-03-23 NOTE — DISCHARGE NOTE PROVIDER - NSDCMRMEDTOKEN_GEN_ALL_CORE_FT
aspirin 81 mg oral delayed release tablet: 1 tab(s) orally once a day  atorvastatin 80 mg oral tablet: 1 tab(s) orally once a day (at bedtime)  hydroCHLOROthiazide 12.5 mg oral capsule: 1 cap(s) orally once a day  losartan 50 mg oral tablet: 1 tab(s) orally once a day  metoprolol tartrate 25 mg oral tablet: 1 tab(s) orally 2 times a day  ticagrelor 90 mg oral tablet: 1 tab(s) orally 2 times a day MDD:2  ticagrelor 90 mg oral tablet: 1 tab(s) orally every 12 hours   aspirin 81 mg oral delayed release tablet: 1 tab(s) orally once a day  atorvastatin 80 mg oral tablet: 1 tab(s) orally once a day (at bedtime)  hydroCHLOROthiazide 12.5 mg oral capsule: 1 cap(s) orally once a day  losartan 50 mg oral tablet: 1 tab(s) orally once a day  metoprolol tartrate 25 mg oral tablet: 1 tab(s) orally 2 times a day  ticagrelor 90 mg oral tablet: 1 tab(s) orally every 12 hours

## 2022-04-04 DIAGNOSIS — Z95.1 PRESENCE OF AORTOCORONARY BYPASS GRAFT: ICD-10-CM

## 2022-04-04 DIAGNOSIS — Z79.82 LONG TERM (CURRENT) USE OF ASPIRIN: ICD-10-CM
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
## 2022-04-04 DIAGNOSIS — Z87.891 PERSONAL HISTORY OF NICOTINE DEPENDENCE: ICD-10-CM

## 2022-04-04 DIAGNOSIS — I25.10 ATHEROSCLEROTIC HEART DISEASE OF NATIVE CORONARY ARTERY WITHOUT ANGINA PECTORIS: ICD-10-CM

## 2022-04-04 DIAGNOSIS — I21.4 NON-ST ELEVATION (NSTEMI) MYOCARDIAL INFARCTION: ICD-10-CM

## 2022-04-04 DIAGNOSIS — E11.9 TYPE 2 DIABETES MELLITUS WITHOUT COMPLICATIONS: ICD-10-CM

## 2022-04-04 DIAGNOSIS — Z91.14 PATIENT'S OTHER NONCOMPLIANCE WITH MEDICATION REGIMEN: ICD-10-CM

## 2022-04-04 DIAGNOSIS — K44.9 DIAPHRAGMATIC HERNIA WITHOUT OBSTRUCTION OR GANGRENE: ICD-10-CM

## 2022-04-04 DIAGNOSIS — I16.0 HYPERTENSIVE URGENCY: ICD-10-CM

## 2022-04-04 DIAGNOSIS — K83.8 OTHER SPECIFIED DISEASES OF BILIARY TRACT: ICD-10-CM

## 2022-04-04 DIAGNOSIS — R00.1 BRADYCARDIA, UNSPECIFIED: ICD-10-CM

## 2022-04-04 DIAGNOSIS — T82.855A STENOSIS OF CORONARY ARTERY STENT, INITIAL ENCOUNTER: ICD-10-CM

## 2022-04-04 DIAGNOSIS — I47.2 VENTRICULAR TACHYCARDIA: ICD-10-CM

## 2022-04-04 DIAGNOSIS — Z95.5 PRESENCE OF CORONARY ANGIOPLASTY IMPLANT AND GRAFT: ICD-10-CM

## 2022-04-04 DIAGNOSIS — Z79.02 LONG TERM (CURRENT) USE OF ANTITHROMBOTICS/ANTIPLATELETS: ICD-10-CM
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
## 2022-04-04 DIAGNOSIS — I25.2 OLD MYOCARDIAL INFARCTION: ICD-10-CM

## 2022-04-04 DIAGNOSIS — Z87.11 PERSONAL HISTORY OF PEPTIC ULCER DISEASE: ICD-10-CM

## 2022-04-04 DIAGNOSIS — K76.89 OTHER SPECIFIED DISEASES OF LIVER: ICD-10-CM

## 2022-04-04 DIAGNOSIS — E78.5 HYPERLIPIDEMIA, UNSPECIFIED: ICD-10-CM

## 2022-04-04 DIAGNOSIS — Z79.84 LONG TERM (CURRENT) USE OF ORAL HYPOGLYCEMIC DRUGS: ICD-10-CM

## 2022-04-04 DIAGNOSIS — K21.9 GASTRO-ESOPHAGEAL REFLUX DISEASE WITHOUT ESOPHAGITIS: ICD-10-CM

## 2022-05-09 NOTE — DISCHARGE NOTE PROVIDER - NSDCQMPLAVIX_CARD_A_CORE
No, not prescribed... No Repair - Repaired With Adjacent Surgical Defect Text (Leave Blank If You Do Not Want): After obtaining clear surgical margins the defect was repaired concurrently with another surgical defect which was in close approximation.

## 2022-07-30 ENCOUNTER — EMERGENCY (EMERGENCY)
Facility: HOSPITAL | Age: 62
LOS: 0 days | Discharge: HOME | End: 2022-07-30
Attending: EMERGENCY MEDICINE | Admitting: EMERGENCY MEDICINE

## 2022-07-30 VITALS
SYSTOLIC BLOOD PRESSURE: 143 MMHG | RESPIRATION RATE: 20 BRPM | HEART RATE: 84 BPM | DIASTOLIC BLOOD PRESSURE: 78 MMHG | OXYGEN SATURATION: 98 % | TEMPERATURE: 96 F

## 2022-07-30 VITALS
OXYGEN SATURATION: 99 % | TEMPERATURE: 96 F | HEART RATE: 78 BPM | WEIGHT: 195.99 LBS | SYSTOLIC BLOOD PRESSURE: 189 MMHG | HEIGHT: 67 IN | DIASTOLIC BLOOD PRESSURE: 94 MMHG | RESPIRATION RATE: 20 BRPM

## 2022-07-30 DIAGNOSIS — R07.89 OTHER CHEST PAIN: ICD-10-CM

## 2022-07-30 DIAGNOSIS — R11.2 NAUSEA WITH VOMITING, UNSPECIFIED: ICD-10-CM

## 2022-07-30 DIAGNOSIS — Z87.19 PERSONAL HISTORY OF OTHER DISEASES OF THE DIGESTIVE SYSTEM: ICD-10-CM

## 2022-07-30 DIAGNOSIS — Z79.82 LONG TERM (CURRENT) USE OF ASPIRIN: ICD-10-CM

## 2022-07-30 DIAGNOSIS — Z95.5 PRESENCE OF CORONARY ANGIOPLASTY IMPLANT AND GRAFT: Chronic | ICD-10-CM

## 2022-07-30 DIAGNOSIS — T36.0X5A ADVERSE EFFECT OF PENICILLINS, INITIAL ENCOUNTER: ICD-10-CM

## 2022-07-30 DIAGNOSIS — R94.4 ABNORMAL RESULTS OF KIDNEY FUNCTION STUDIES: ICD-10-CM

## 2022-07-30 DIAGNOSIS — I25.2 OLD MYOCARDIAL INFARCTION: ICD-10-CM

## 2022-07-30 DIAGNOSIS — Z91.138 PATIENT'S UNINTENTIONAL UNDERDOSING OF MEDICATION REGIMEN FOR OTHER REASON: ICD-10-CM

## 2022-07-30 DIAGNOSIS — I25.10 ATHEROSCLEROTIC HEART DISEASE OF NATIVE CORONARY ARTERY WITHOUT ANGINA PECTORIS: ICD-10-CM

## 2022-07-30 DIAGNOSIS — Z95.1 PRESENCE OF AORTOCORONARY BYPASS GRAFT: ICD-10-CM

## 2022-07-30 DIAGNOSIS — E11.9 TYPE 2 DIABETES MELLITUS WITHOUT COMPLICATIONS: ICD-10-CM

## 2022-07-30 DIAGNOSIS — I10 ESSENTIAL (PRIMARY) HYPERTENSION: ICD-10-CM

## 2022-07-30 DIAGNOSIS — Z79.01 LONG TERM (CURRENT) USE OF ANTICOAGULANTS: ICD-10-CM

## 2022-07-30 DIAGNOSIS — E78.5 HYPERLIPIDEMIA, UNSPECIFIED: ICD-10-CM

## 2022-07-30 DIAGNOSIS — Z95.5 PRESENCE OF CORONARY ANGIOPLASTY IMPLANT AND GRAFT: ICD-10-CM
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
## 2022-07-30 DIAGNOSIS — I49.9 CARDIAC ARRHYTHMIA, UNSPECIFIED: Chronic | ICD-10-CM
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
## 2022-07-30 DIAGNOSIS — I25.810 ATHEROSCLEROSIS OF CORONARY ARTERY BYPASS GRAFT(S) WITHOUT ANGINA PECTORIS: Chronic | ICD-10-CM

## 2022-07-30 LAB
ALBUMIN SERPL ELPH-MCNC: 4.4 G/DL — SIGNIFICANT CHANGE UP (ref 3.5–5.2)
ALP SERPL-CCNC: 81 U/L — SIGNIFICANT CHANGE UP (ref 30–115)
ALT FLD-CCNC: 28 U/L — SIGNIFICANT CHANGE UP (ref 0–41)
ANION GAP SERPL CALC-SCNC: 14 MMOL/L — SIGNIFICANT CHANGE UP (ref 7–14)
AST SERPL-CCNC: 23 U/L — SIGNIFICANT CHANGE UP (ref 0–41)
BASOPHILS # BLD AUTO: 0.04 K/UL — SIGNIFICANT CHANGE UP (ref 0–0.2)
BASOPHILS NFR BLD AUTO: 0.3 % — SIGNIFICANT CHANGE UP (ref 0–1)
BILIRUB SERPL-MCNC: 2 MG/DL — HIGH (ref 0.2–1.2)
BUN SERPL-MCNC: 35 MG/DL — HIGH (ref 10–20)
CALCIUM SERPL-MCNC: 10.2 MG/DL — HIGH (ref 8.5–10.1)
CHLORIDE SERPL-SCNC: 99 MMOL/L — SIGNIFICANT CHANGE UP (ref 98–110)
CO2 SERPL-SCNC: 27 MMOL/L — SIGNIFICANT CHANGE UP (ref 17–32)
CREAT SERPL-MCNC: 1.8 MG/DL — HIGH (ref 0.7–1.5)
EGFR: 43 ML/MIN/1.73M2 — LOW
EOSINOPHIL # BLD AUTO: 0.18 K/UL — SIGNIFICANT CHANGE UP (ref 0–0.7)
EOSINOPHIL NFR BLD AUTO: 1.4 % — SIGNIFICANT CHANGE UP (ref 0–8)
GLUCOSE SERPL-MCNC: 167 MG/DL — HIGH (ref 70–99)
HCT VFR BLD CALC: 41.9 % — LOW (ref 42–52)
HGB BLD-MCNC: 13.4 G/DL — LOW (ref 14–18)
IMM GRANULOCYTES NFR BLD AUTO: 0.3 % — SIGNIFICANT CHANGE UP (ref 0.1–0.3)
LYMPHOCYTES # BLD AUTO: 1.44 K/UL — SIGNIFICANT CHANGE UP (ref 1.2–3.4)
LYMPHOCYTES # BLD AUTO: 11.6 % — LOW (ref 20.5–51.1)
MAGNESIUM SERPL-MCNC: 2.2 MG/DL — SIGNIFICANT CHANGE UP (ref 1.8–2.4)
MCHC RBC-ENTMCNC: 27 PG — SIGNIFICANT CHANGE UP (ref 27–31)
MCHC RBC-ENTMCNC: 32 G/DL — SIGNIFICANT CHANGE UP (ref 32–37)
MCV RBC AUTO: 84.3 FL — SIGNIFICANT CHANGE UP (ref 80–94)
MONOCYTES # BLD AUTO: 1.34 K/UL — HIGH (ref 0.1–0.6)
MONOCYTES NFR BLD AUTO: 10.8 % — HIGH (ref 1.7–9.3)
NEUTROPHILS # BLD AUTO: 9.41 K/UL — HIGH (ref 1.4–6.5)
NEUTROPHILS NFR BLD AUTO: 75.6 % — HIGH (ref 42.2–75.2)
NRBC # BLD: 0 /100 WBCS — SIGNIFICANT CHANGE UP (ref 0–0)
NT-PROBNP SERPL-SCNC: 315 PG/ML — HIGH (ref 0–300)
PLATELET # BLD AUTO: 324 K/UL — SIGNIFICANT CHANGE UP (ref 130–400)
POTASSIUM SERPL-MCNC: 4.1 MMOL/L — SIGNIFICANT CHANGE UP (ref 3.5–5)
POTASSIUM SERPL-SCNC: 4.1 MMOL/L — SIGNIFICANT CHANGE UP (ref 3.5–5)
PROT SERPL-MCNC: 7.3 G/DL — SIGNIFICANT CHANGE UP (ref 6–8)
RBC # BLD: 4.97 M/UL — SIGNIFICANT CHANGE UP (ref 4.7–6.1)
RBC # FLD: 13.2 % — SIGNIFICANT CHANGE UP (ref 11.5–14.5)
SARS-COV-2 RNA SPEC QL NAA+PROBE: SIGNIFICANT CHANGE UP
SODIUM SERPL-SCNC: 140 MMOL/L — SIGNIFICANT CHANGE UP (ref 135–146)
TROPONIN T SERPL-MCNC: <0.01 NG/ML — SIGNIFICANT CHANGE UP
TROPONIN T SERPL-MCNC: <0.01 NG/ML — SIGNIFICANT CHANGE UP
WBC # BLD: 12.45 K/UL — HIGH (ref 4.8–10.8)
WBC # FLD AUTO: 12.45 K/UL — HIGH (ref 4.8–10.8)

## 2022-07-30 PROCEDURE — 93010 ELECTROCARDIOGRAM REPORT: CPT

## 2022-07-30 PROCEDURE — 71045 X-RAY EXAM CHEST 1 VIEW: CPT | Mod: 26

## 2022-07-30 PROCEDURE — 99285 EMERGENCY DEPT VISIT HI MDM: CPT

## 2022-07-30 PROCEDURE — 99053 MED SERV 10PM-8AM 24 HR FAC: CPT

## 2022-07-30 RX ORDER — ONDANSETRON 8 MG/1
4 TABLET, FILM COATED ORAL ONCE
Refills: 0 | Status: COMPLETED | OUTPATIENT
Start: 2022-07-30 | End: 2022-07-30

## 2022-07-30 RX ORDER — SODIUM CHLORIDE 9 MG/ML
500 INJECTION INTRAMUSCULAR; INTRAVENOUS; SUBCUTANEOUS ONCE
Refills: 0 | Status: COMPLETED | OUTPATIENT
Start: 2022-07-30 | End: 2022-07-30

## 2022-07-30 RX ADMIN — SODIUM CHLORIDE 500 MILLILITER(S): 9 INJECTION INTRAMUSCULAR; INTRAVENOUS; SUBCUTANEOUS at 04:06

## 2022-07-30 RX ADMIN — ONDANSETRON 4 MILLIGRAM(S): 8 TABLET, FILM COATED ORAL at 04:06

## 2022-07-30 NOTE — ED PROVIDER NOTE - PROGRESS NOTE DETAILS
endorsed to Dr Ordonez Accepted by Dr. Christianson. 1.5 hours after taking Augmentin for URI pt developed severe nausea and vomiting. No chest pain, no shortness of breath, no abdominal pain, no back pain. Pt has been dieting for the last 5 days and lost 5 lbs. After treatment here he feels better. Hx of CAD, MI and bypass and stents. All previous ischemic events caused chest pain or abdominal pain. Hx of bypass and most recently in March had stents. Pt does not smoke. EKG unchanged from previous. First troponin is normal. Pt has no complaints at this time. Pt believes the augment which he took on an empty stomach caused the nausea and vomiting. + elevated creatinine. Second troponin pending. 1.5 hours after taking Augmentin for URI pt developed severe nausea and vomiting. No chest pain, no shortness of breath, no abdominal pain, no back pain. Pt has been dieting for the last 5 days and lost 5 lbs. After treatment here he feels better. Hx of CAD, MI and bypass and stents. All previous ischemic events caused chest pain or abdominal pain. Hx of bypass and most recently in March had stents. Pt does not smoke. EKG unchanged from previous. First troponin is normal. Pt has no complaints at this time. Pt believes the augment which he took on an empty stomach caused the nausea and vomiting. + elevated creatinine. Second troponin pending. On exam S1S2 rrr, lungs clear, abdomen is soft nontender, ext neg for edema , no rash.

## 2022-07-30 NOTE — ED ADULT NURSE NOTE - NSIMPLEMENTINTERV_GEN_ALL_ED
,DirectAddress_Unknown Implemented All Universal Safety Interventions:  [place] to call system. Call bell, personal items and telephone within reach. Instruct patient to call for assistance. Room bathroom lighting operational. Non-slip footwear when patient is off stretcher. Physically safe environment: no spills, clutter or unnecessary equipment. Stretcher in lowest position, wheels locked, appropriate side rails in place.

## 2022-07-30 NOTE — ED PROVIDER NOTE - PROVIDER TOKENS
FREE:[LAST:[Your doctor],PHONE:[(   )    -],FAX:[(   )    -],ADDRESS:[Your doctor this week],FOLLOWUP:[1-3 Days]]

## 2022-07-30 NOTE — ED PROVIDER NOTE - NS ED ATTENDING STATEMENT MOD
This was a shared visit with the CAR[facility]. I reviewed and verified the documentation and independently performed the documented:

## 2022-07-30 NOTE — ED PROVIDER NOTE - PATIENT PORTAL LINK FT
You can access the FollowMyHealth Patient Portal offered by [facility] by registering at the following website: [email or web address]. By joining [employer]’s FollowMyHealth portal, you will also be able to view your health information using other applications (apps) compatible with our system.

## 2022-07-30 NOTE — ED ADULT TRIAGE NOTE - CHIEF COMPLAINT QUOTE
Started oral antibiotin. Nausea  and vomiting starting @ 0100. c/o chest pain during episodes of vomiting.

## 2022-07-30 NOTE — ED PROVIDER NOTE - OBJECTIVE STATEMENT
61 yo male, pmh of cad stents on Brilinta, htn, hld, asa, presents to ed for nausea and vomiting. pt states was rx Augmentin, took a dose and had nv, now with left chest wall ttp, mild, aching, no radiation. Denies fever, chills, sob, abd pain, diarrhea, dizziness, syncope.

## 2022-07-30 NOTE — ED PROVIDER NOTE - CARE PROVIDER_API CALL
Your doctor,   Your doctor this week  Phone: (   )    -  Fax: (   )    -  Follow Up Time: 1-3 Days

## 2022-07-30 NOTE — ED PROVIDER NOTE - CLINICAL SUMMARY MEDICAL DECISION MAKING FREE TEXT BOX
Pt presents with vomiting and no chest pain. Previous ischemic events were associated with pain. No pain on this event. Pt took Augmentin about 1.5 hours prior. Pt has had two neg troponin. Feeling better after treatment. This does not seem to be cardiac.

## 2022-07-30 NOTE — ED PROVIDER NOTE - ATTENDING APP SHARED VISIT CONTRIBUTION OF CARE
62y male above PMH c/o n/v after taking augmentin on empty stomach, had chest discomfort "from vomiting" completely dissimilar to prior cardiac events, no diarrhea, finished vomiting in triage and feels better, on exam vital signs appreciated, head nc/at, perrla, EOMI, conj pink op clear neck supple cor rrr lungs cta abd snt no c/c/e pulses equal calves nontender neuro intact, will check ekg, cxr, labs, judicious IVF (EF 32%), reassess

## 2022-10-20 NOTE — ED PROVIDER NOTE - IV ALTEPLASE ADMIN OUTSIDE HIDDEN
Spoke with the pharmacy- they will get this done for pt.  Called pt & informed her that they were working on it, & to check back in with pharmacy in the morning, she gave verbal understanding.   show

## 2023-02-27 ENCOUNTER — INPATIENT (INPATIENT)
Facility: HOSPITAL | Age: 63
LOS: 1 days | Discharge: HOME | DRG: 191 | End: 2023-03-01
Attending: INTERNAL MEDICINE | Admitting: INTERNAL MEDICINE
Payer: MEDICAID

## 2023-02-27 VITALS
WEIGHT: 199.96 LBS | RESPIRATION RATE: 17 BRPM | HEIGHT: 67 IN | TEMPERATURE: 98 F | HEART RATE: 66 BPM | OXYGEN SATURATION: 98 % | DIASTOLIC BLOOD PRESSURE: 91 MMHG | SYSTOLIC BLOOD PRESSURE: 148 MMHG

## 2023-02-27 DIAGNOSIS — Z95.5 PRESENCE OF CORONARY ANGIOPLASTY IMPLANT AND GRAFT: Chronic | ICD-10-CM

## 2023-02-27 DIAGNOSIS — I25.118 ATHEROSCLEROTIC HEART DISEASE OF NATIVE CORONARY ARTERY WITH OTHER FORMS OF ANGINA PECTORIS: ICD-10-CM

## 2023-02-27 DIAGNOSIS — Z95.5 PRESENCE OF CORONARY ANGIOPLASTY IMPLANT AND GRAFT: ICD-10-CM

## 2023-02-27 DIAGNOSIS — I25.82 CHRONIC TOTAL OCCLUSION OF CORONARY ARTERY: ICD-10-CM

## 2023-02-27 DIAGNOSIS — R07.9 CHEST PAIN, UNSPECIFIED: ICD-10-CM

## 2023-02-27 DIAGNOSIS — Z95.1 PRESENCE OF AORTOCORONARY BYPASS GRAFT: ICD-10-CM

## 2023-02-27 DIAGNOSIS — Z87.891 PERSONAL HISTORY OF NICOTINE DEPENDENCE: ICD-10-CM

## 2023-02-27 DIAGNOSIS — I25.2 OLD MYOCARDIAL INFARCTION: ICD-10-CM

## 2023-02-27 DIAGNOSIS — I25.810 ATHEROSCLEROSIS OF CORONARY ARTERY BYPASS GRAFT(S) WITHOUT ANGINA PECTORIS: Chronic | ICD-10-CM

## 2023-02-27 DIAGNOSIS — I50.22 CHRONIC SYSTOLIC (CONGESTIVE) HEART FAILURE: ICD-10-CM

## 2023-02-27 DIAGNOSIS — I49.9 CARDIAC ARRHYTHMIA, UNSPECIFIED: Chronic | ICD-10-CM

## 2023-02-27 DIAGNOSIS — E11.9 TYPE 2 DIABETES MELLITUS WITHOUT COMPLICATIONS: ICD-10-CM

## 2023-02-27 DIAGNOSIS — K21.9 GASTRO-ESOPHAGEAL REFLUX DISEASE WITHOUT ESOPHAGITIS: ICD-10-CM

## 2023-02-27 DIAGNOSIS — Z20.822 CONTACT WITH AND (SUSPECTED) EXPOSURE TO COVID-19: ICD-10-CM

## 2023-02-27 DIAGNOSIS — Z79.02 LONG TERM (CURRENT) USE OF ANTITHROMBOTICS/ANTIPLATELETS: ICD-10-CM

## 2023-02-27 DIAGNOSIS — E78.5 HYPERLIPIDEMIA, UNSPECIFIED: ICD-10-CM

## 2023-02-27 DIAGNOSIS — I11.0 HYPERTENSIVE HEART DISEASE WITH HEART FAILURE: ICD-10-CM

## 2023-02-27 DIAGNOSIS — Z79.82 LONG TERM (CURRENT) USE OF ASPIRIN: ICD-10-CM

## 2023-02-27 LAB
ALBUMIN SERPL ELPH-MCNC: 4.3 G/DL — SIGNIFICANT CHANGE UP (ref 3.5–5.2)
ALP SERPL-CCNC: 85 U/L — SIGNIFICANT CHANGE UP (ref 30–115)
ALT FLD-CCNC: 27 U/L — SIGNIFICANT CHANGE UP (ref 0–41)
ANION GAP SERPL CALC-SCNC: 11 MMOL/L — SIGNIFICANT CHANGE UP (ref 7–14)
AST SERPL-CCNC: 22 U/L — SIGNIFICANT CHANGE UP (ref 0–41)
BASE EXCESS BLDV CALC-SCNC: 6.6 MMOL/L — HIGH (ref -2–3)
BASOPHILS # BLD AUTO: 0.07 K/UL — SIGNIFICANT CHANGE UP (ref 0–0.2)
BASOPHILS NFR BLD AUTO: 1 % — SIGNIFICANT CHANGE UP (ref 0–1)
BILIRUB SERPL-MCNC: 2 MG/DL — HIGH (ref 0.2–1.2)
BUN SERPL-MCNC: 22 MG/DL — HIGH (ref 10–20)
CA-I SERPL-SCNC: 1.25 MMOL/L — SIGNIFICANT CHANGE UP (ref 1.15–1.33)
CALCIUM SERPL-MCNC: 10 MG/DL — SIGNIFICANT CHANGE UP (ref 8.4–10.4)
CHLORIDE SERPL-SCNC: 101 MMOL/L — SIGNIFICANT CHANGE UP (ref 98–110)
CO2 SERPL-SCNC: 27 MMOL/L — SIGNIFICANT CHANGE UP (ref 17–32)
CREAT SERPL-MCNC: 1.3 MG/DL — SIGNIFICANT CHANGE UP (ref 0.7–1.5)
EGFR: 63 ML/MIN/1.73M2 — SIGNIFICANT CHANGE UP
EOSINOPHIL # BLD AUTO: 0.29 K/UL — SIGNIFICANT CHANGE UP (ref 0–0.7)
EOSINOPHIL NFR BLD AUTO: 4.2 % — SIGNIFICANT CHANGE UP (ref 0–8)
GAS PNL BLDV: 134 MMOL/L — LOW (ref 136–145)
GAS PNL BLDV: SIGNIFICANT CHANGE UP
GLUCOSE BLDC GLUCOMTR-MCNC: 190 MG/DL — HIGH (ref 70–99)
GLUCOSE SERPL-MCNC: 97 MG/DL — SIGNIFICANT CHANGE UP (ref 70–99)
HCO3 BLDV-SCNC: 32 MMOL/L — HIGH (ref 22–29)
HCT VFR BLD CALC: 44.5 % — SIGNIFICANT CHANGE UP (ref 42–52)
HCT VFR BLDA CALC: 41 % — SIGNIFICANT CHANGE UP (ref 39–51)
HGB BLD CALC-MCNC: 13.8 G/DL — SIGNIFICANT CHANGE UP (ref 12.6–17.4)
HGB BLD-MCNC: 14.1 G/DL — SIGNIFICANT CHANGE UP (ref 14–18)
IMM GRANULOCYTES NFR BLD AUTO: 0.6 % — HIGH (ref 0.1–0.3)
LACTATE BLDV-MCNC: 1.2 MMOL/L — SIGNIFICANT CHANGE UP (ref 0.5–2)
LYMPHOCYTES # BLD AUTO: 1.9 K/UL — SIGNIFICANT CHANGE UP (ref 1.2–3.4)
LYMPHOCYTES # BLD AUTO: 27.3 % — SIGNIFICANT CHANGE UP (ref 20.5–51.1)
MAGNESIUM SERPL-MCNC: 1.9 MG/DL — SIGNIFICANT CHANGE UP (ref 1.8–2.4)
MCHC RBC-ENTMCNC: 26.9 PG — LOW (ref 27–31)
MCHC RBC-ENTMCNC: 31.7 G/DL — LOW (ref 32–37)
MCV RBC AUTO: 84.9 FL — SIGNIFICANT CHANGE UP (ref 80–94)
MONOCYTES # BLD AUTO: 0.71 K/UL — HIGH (ref 0.1–0.6)
MONOCYTES NFR BLD AUTO: 10.2 % — HIGH (ref 1.7–9.3)
NEUTROPHILS # BLD AUTO: 3.94 K/UL — SIGNIFICANT CHANGE UP (ref 1.4–6.5)
NEUTROPHILS NFR BLD AUTO: 56.7 % — SIGNIFICANT CHANGE UP (ref 42.2–75.2)
NRBC # BLD: 0 /100 WBCS — SIGNIFICANT CHANGE UP (ref 0–0)
NT-PROBNP SERPL-SCNC: 276 PG/ML — SIGNIFICANT CHANGE UP (ref 0–300)
PCO2 BLDV: 47 MMHG — SIGNIFICANT CHANGE UP (ref 42–55)
PH BLDV: 7.44 — HIGH (ref 7.32–7.43)
PLATELET # BLD AUTO: 291 K/UL — SIGNIFICANT CHANGE UP (ref 130–400)
PO2 BLDV: 16 MMHG — SIGNIFICANT CHANGE UP
POTASSIUM BLDV-SCNC: 4.2 MMOL/L — SIGNIFICANT CHANGE UP (ref 3.5–5.1)
POTASSIUM SERPL-MCNC: 4.1 MMOL/L — SIGNIFICANT CHANGE UP (ref 3.5–5)
POTASSIUM SERPL-SCNC: 4.1 MMOL/L — SIGNIFICANT CHANGE UP (ref 3.5–5)
PROT SERPL-MCNC: 7.3 G/DL — SIGNIFICANT CHANGE UP (ref 6–8)
RBC # BLD: 5.24 M/UL — SIGNIFICANT CHANGE UP (ref 4.7–6.1)
RBC # FLD: 12.8 % — SIGNIFICANT CHANGE UP (ref 11.5–14.5)
SAO2 % BLDV: 20.7 % — SIGNIFICANT CHANGE UP
SARS-COV-2 RNA SPEC QL NAA+PROBE: SIGNIFICANT CHANGE UP
SODIUM SERPL-SCNC: 139 MMOL/L — SIGNIFICANT CHANGE UP (ref 135–146)
TROPONIN T SERPL-MCNC: <0.01 NG/ML — SIGNIFICANT CHANGE UP
WBC # BLD: 6.95 K/UL — SIGNIFICANT CHANGE UP (ref 4.8–10.8)
WBC # FLD AUTO: 6.95 K/UL — SIGNIFICANT CHANGE UP (ref 4.8–10.8)

## 2023-02-27 PROCEDURE — 80061 LIPID PANEL: CPT

## 2023-02-27 PROCEDURE — 83735 ASSAY OF MAGNESIUM: CPT

## 2023-02-27 PROCEDURE — C1887: CPT

## 2023-02-27 PROCEDURE — 93005 ELECTROCARDIOGRAM TRACING: CPT

## 2023-02-27 PROCEDURE — U0003: CPT

## 2023-02-27 PROCEDURE — 85730 THROMBOPLASTIN TIME PARTIAL: CPT

## 2023-02-27 PROCEDURE — C1894: CPT

## 2023-02-27 PROCEDURE — 71045 X-RAY EXAM CHEST 1 VIEW: CPT

## 2023-02-27 PROCEDURE — 93458 L HRT ARTERY/VENTRICLE ANGIO: CPT

## 2023-02-27 PROCEDURE — 84484 ASSAY OF TROPONIN QUANT: CPT

## 2023-02-27 PROCEDURE — C1769: CPT

## 2023-02-27 PROCEDURE — U0005: CPT

## 2023-02-27 PROCEDURE — 80053 COMPREHEN METABOLIC PANEL: CPT

## 2023-02-27 PROCEDURE — 99222 1ST HOSP IP/OBS MODERATE 55: CPT | Mod: GC

## 2023-02-27 PROCEDURE — 85025 COMPLETE CBC W/AUTO DIFF WBC: CPT

## 2023-02-27 PROCEDURE — 71045 X-RAY EXAM CHEST 1 VIEW: CPT | Mod: 26

## 2023-02-27 PROCEDURE — 99285 EMERGENCY DEPT VISIT HI MDM: CPT

## 2023-02-27 PROCEDURE — 93010 ELECTROCARDIOGRAM REPORT: CPT

## 2023-02-27 PROCEDURE — 83036 HEMOGLOBIN GLYCOSYLATED A1C: CPT

## 2023-02-27 PROCEDURE — 36415 COLL VENOUS BLD VENIPUNCTURE: CPT

## 2023-02-27 PROCEDURE — 82962 GLUCOSE BLOOD TEST: CPT

## 2023-02-27 PROCEDURE — 85610 PROTHROMBIN TIME: CPT

## 2023-02-27 RX ORDER — ASPIRIN/CALCIUM CARB/MAGNESIUM 324 MG
81 TABLET ORAL DAILY
Refills: 0 | Status: DISCONTINUED | OUTPATIENT
Start: 2023-02-27 | End: 2023-03-01

## 2023-02-27 RX ORDER — ATORVASTATIN CALCIUM 80 MG/1
80 TABLET, FILM COATED ORAL AT BEDTIME
Refills: 0 | Status: DISCONTINUED | OUTPATIENT
Start: 2023-02-27 | End: 2023-03-01

## 2023-02-27 RX ORDER — ASPIRIN/CALCIUM CARB/MAGNESIUM 324 MG
324 TABLET ORAL ONCE
Refills: 0 | Status: COMPLETED | OUTPATIENT
Start: 2023-02-27 | End: 2023-02-27

## 2023-02-27 RX ORDER — SODIUM CHLORIDE 9 MG/ML
1000 INJECTION, SOLUTION INTRAVENOUS
Refills: 0 | Status: DISCONTINUED | OUTPATIENT
Start: 2023-02-27 | End: 2023-03-01

## 2023-02-27 RX ORDER — LOSARTAN POTASSIUM 100 MG/1
50 TABLET, FILM COATED ORAL DAILY
Refills: 0 | Status: DISCONTINUED | OUTPATIENT
Start: 2023-02-27 | End: 2023-03-01

## 2023-02-27 RX ORDER — FAMOTIDINE 10 MG/ML
20 INJECTION INTRAVENOUS ONCE
Refills: 0 | Status: COMPLETED | OUTPATIENT
Start: 2023-02-27 | End: 2023-02-27

## 2023-02-27 RX ORDER — ASPIRIN/CALCIUM CARB/MAGNESIUM 324 MG
325 TABLET ORAL ONCE
Refills: 0 | Status: COMPLETED | OUTPATIENT
Start: 2023-02-27 | End: 2023-02-27

## 2023-02-27 RX ORDER — DIPHENHYDRAMINE HYDROCHLORIDE AND LIDOCAINE HYDROCHLORIDE AND ALUMINUM HYDROXIDE AND MAGNESIUM HYDRO
30 KIT ONCE
Refills: 0 | Status: COMPLETED | OUTPATIENT
Start: 2023-02-27 | End: 2023-02-27

## 2023-02-27 RX ORDER — SODIUM CHLORIDE 9 MG/ML
1000 INJECTION, SOLUTION INTRAVENOUS
Refills: 0 | Status: DISCONTINUED | OUTPATIENT
Start: 2023-02-27 | End: 2023-02-28

## 2023-02-27 RX ORDER — PANTOPRAZOLE SODIUM 20 MG/1
40 TABLET, DELAYED RELEASE ORAL
Refills: 0 | Status: DISCONTINUED | OUTPATIENT
Start: 2023-02-27 | End: 2023-03-01

## 2023-02-27 RX ORDER — METOPROLOL TARTRATE 50 MG
25 TABLET ORAL
Refills: 0 | Status: DISCONTINUED | OUTPATIENT
Start: 2023-02-27 | End: 2023-03-01

## 2023-02-27 RX ORDER — DEXTROSE 50 % IN WATER 50 %
25 SYRINGE (ML) INTRAVENOUS ONCE
Refills: 0 | Status: DISCONTINUED | OUTPATIENT
Start: 2023-02-27 | End: 2023-03-01

## 2023-02-27 RX ORDER — DEXTROSE 50 % IN WATER 50 %
15 SYRINGE (ML) INTRAVENOUS ONCE
Refills: 0 | Status: DISCONTINUED | OUTPATIENT
Start: 2023-02-27 | End: 2023-03-01

## 2023-02-27 RX ORDER — TICAGRELOR 90 MG/1
90 TABLET ORAL EVERY 12 HOURS
Refills: 0 | Status: DISCONTINUED | OUTPATIENT
Start: 2023-02-27 | End: 2023-03-01

## 2023-02-27 RX ORDER — INSULIN LISPRO 100/ML
VIAL (ML) SUBCUTANEOUS
Refills: 0 | Status: DISCONTINUED | OUTPATIENT
Start: 2023-02-27 | End: 2023-03-01

## 2023-02-27 RX ORDER — GLUCAGON INJECTION, SOLUTION 0.5 MG/.1ML
1 INJECTION, SOLUTION SUBCUTANEOUS ONCE
Refills: 0 | Status: DISCONTINUED | OUTPATIENT
Start: 2023-02-27 | End: 2023-03-01

## 2023-02-27 RX ORDER — ENOXAPARIN SODIUM 100 MG/ML
40 INJECTION SUBCUTANEOUS EVERY 24 HOURS
Refills: 0 | Status: DISCONTINUED | OUTPATIENT
Start: 2023-02-27 | End: 2023-03-01

## 2023-02-27 RX ORDER — DEXTROSE 50 % IN WATER 50 %
12.5 SYRINGE (ML) INTRAVENOUS ONCE
Refills: 0 | Status: DISCONTINUED | OUTPATIENT
Start: 2023-02-27 | End: 2023-03-01

## 2023-02-27 RX ADMIN — LOSARTAN POTASSIUM 50 MILLIGRAM(S): 100 TABLET, FILM COATED ORAL at 23:47

## 2023-02-27 RX ADMIN — DIPHENHYDRAMINE HYDROCHLORIDE AND LIDOCAINE HYDROCHLORIDE AND ALUMINUM HYDROXIDE AND MAGNESIUM HYDRO 30 MILLILITER(S): KIT at 18:19

## 2023-02-27 RX ADMIN — Medication 25 MILLIGRAM(S): at 23:47

## 2023-02-27 RX ADMIN — TICAGRELOR 90 MILLIGRAM(S): 90 TABLET ORAL at 23:46

## 2023-02-27 RX ADMIN — Medication 324 MILLIGRAM(S): at 23:46

## 2023-02-27 RX ADMIN — ENOXAPARIN SODIUM 40 MILLIGRAM(S): 100 INJECTION SUBCUTANEOUS at 23:48

## 2023-02-27 RX ADMIN — Medication 325 MILLIGRAM(S): at 18:03

## 2023-02-27 RX ADMIN — FAMOTIDINE 20 MILLIGRAM(S): 10 INJECTION INTRAVENOUS at 18:07

## 2023-02-27 RX ADMIN — ATORVASTATIN CALCIUM 80 MILLIGRAM(S): 80 TABLET, FILM COATED ORAL at 23:47

## 2023-02-27 NOTE — ED PROVIDER NOTE - PHYSICAL EXAMINATION
CONSTITUTIONAL: Well-developed; well-nourished; in no acute distress, nontoxic appearing  SKIN: skin exam is warm and dry  HEAD: Normocephalic; atraumatic  EYES: PERRL, conjunctiva and sclera clear.  ENT: MMM  CARD: S1, S2 normal, no murmur  RESP: No wheezes, rales or rhonchi. Good air movement bilaterally  ABD: soft; non-distended; non-tender.   EXT: Normal ROM  NEURO: awake, alert, following commands, oriented, grossly unremarkable. No Focal deficits. GCS 15.   PSYCH: Cooperative, appropriate.

## 2023-02-27 NOTE — ED ADULT TRIAGE NOTE - CHIEF COMPLAINT QUOTE
pt c/o sob and cp more than usual on exertion over the past few days. pt has hx of double bypass and 3 stents.

## 2023-02-27 NOTE — H&P ADULT - ATTENDING COMMENTS
HPI:  62 year old male, past medical history cad s/p 3 stents, CABG, 2 MI, htn, hld, dm, gerd, gastric ulcers, who presents with shortness of breath. patient with exertional shortness of breath that began x1 week ago, worsening since. patient also with central chest tightness/pressure, intermittent. patient was @ store today when he felt diaphoretic, pale and chest discomfort prompting ed eval. denies fever, chills, hemoptysis, abd pain, back pain, nausea/vomiting/diarrhea, urinary symptoms, syncope.    Troponin T, Serum: <0.01: Hemolyzed. Interpret with caution ng/mL (02.27.23 @ 15:49)     Vital Signs Last 24 Hrs:  T(F): 98 (27 Feb 2023 14:37), Max: 98 (27 Feb 2023 14:37)  HR: 66 (27 Feb 2023 14:37) (66 - 66)  BP: 148/91 (27 Feb 2023 14:37) (148/91 - 148/91)  RR: 17 (27 Feb 2023 14:37) (17 - 17)  SpO2: 98% (27 Feb 2023 14:37) (98% - 98%) on RA   (27 Feb 2023 20:26)    REVIEW OF SYSTEMS: see cc/HPI   CONSTITUTIONAL: No weakness, fevers or chills  EYES/ENT: No visual changes;  No vertigo or throat pain   NECK: No pain or stiffness  RESPIRATORY: No cough, wheezing, hemoptysis; No shortness of breath  CARDIOVASCULAR: No chest pain or palpitations  GASTROINTESTINAL: No abdominal or epigastric pain. No nausea, vomiting, or hematemesis; No diarrhea or constipation. No melena or hematochezia.  GENITOURINARY: No dysuria, frequency or hematuria  NEUROLOGICAL: No numbness or weakness  SKIN: No itching, rashes    Physical Exam:   General: WN/WD NAD  Neurology: A&Ox3, nonfocal, follows commands  Eyes: PERRLA/ EOMI  ENT/Neck: Neck supple, trachea midline, No JVD  Respiratory: CTA B/L, No wheezing, rales, rhonchi  CV: Normal rate regular rhythm, S1S2, no murmurs, rubs or gallops  Abdominal: Soft, NT, ND +BS,   Extremities: No edema, + peripheral pulses  Skin: No Rashes, Hematoma, Ecchymosis  Incisions:   Tubes:    A/p  suspected Unstable angina/ ACS   H/o CAD s/p PCI     HTN     DVT prophylaxis HPI:  62 year old male, past medical history cad s/p 3 stents, CABG, 2 MI, htn, hld, dm, gerd, gastric ulcers, who presents with shortness of breath. patient with exertional shortness of breath that began x1 week ago, worsening since. patient also with central chest tightness/pressure, intermittent. patient was @ store today when he felt diaphoretic, pale and chest discomfort prompting ed eval. denies fever, chills, hemoptysis, abd pain, back pain, nausea/vomiting/diarrhea, urinary symptoms, syncope.    Troponin T, Serum: <0.01: Hemolyzed. Interpret with caution ng/mL (02.27.23 @ 15:49)     Vital Signs Last 24 Hrs:  T(F): 98 (27 Feb 2023 14:37), Max: 98 (27 Feb 2023 14:37)  HR: 66 (27 Feb 2023 14:37) (66 - 66)  BP: 148/91 (27 Feb 2023 14:37) (148/91 - 148/91)  RR: 17 (27 Feb 2023 14:37) (17 - 17)  SpO2: 98% (27 Feb 2023 14:37) (98% - 98%) on RA   (27 Feb 2023 20:26)    REVIEW OF SYSTEMS: see cc/HPI   CONSTITUTIONAL: No weakness, fevers or chills  EYES/ENT: No visual changes;  No vertigo or throat pain   NECK: No pain or stiffness  RESPIRATORY: No cough, wheezing, hemoptysis; No shortness of breath  CARDIOVASCULAR: (+) chest pain, No palpitations (+) recent MURRIETA (+) long standing claudication  GASTROINTESTINAL: No abdominal or epigastric pain. No nausea, vomiting, or hematemesis; No diarrhea or constipation. No melena or hematochezia.  GENITOURINARY: No dysuria, frequency or hematuria  NEUROLOGICAL: No numbness or weakness  SKIN: No itching, rashes    Physical Exam:   General: WN/WD NAD  Neurology: A&Ox3, nonfocal, follows commands  Eyes: PERRLA/ EOMI  ENT/Neck: Neck supple, trachea midline, No JVD  Respiratory: CTA B/L, No wheezing, rales, rhonchi  CV: Normal rate regular rhythm, S1S2, no murmurs, rubs or gallops  Abdominal: Soft, NT, ND +BS,   Extremities: No edema, + peripheral pulses  Skin: No Rashes, Hematoma, Ecchymosis  Incisions:   Tubes:    A/p  suspected Unstable angina/ ACS   H/o CAD s/p PCI w/ stants x 3  -serial Barbra and EKG  -2D echo   -Cardiology eval - Dr. Cardozo   -c/w OP Rx - to include ASA/ Brilinta/ Atorvastatin /BBlocker   -IV fluids   -NPO for possible cath in AM     HTN - stable and Rx as above    PUD - PPI for GI prophylaxis     DVT prophylaxis    PATIENT SEEN by ATTENDING 2/27/23 ( note revised 2/28)

## 2023-02-27 NOTE — H&P ADULT - NSHPPHYSICALEXAM_GEN_ALL_CORE
GENERAL: NAD, lying in bed comfortably  CHEST/LUNG: No rales, rhonchi, wheezing, or rubs. Unlabored respirations  HEART: Regular rate and rhythm  ABDOMEN: Soft, Nontender, Nondistended  EXTREMITIES: No clubbing, cyanosis, or edema  NERVOUS SYSTEM:  Alert & Oriented X3, speech clear. No deficits

## 2023-02-27 NOTE — H&P ADULT - ASSESSMENT
#Suspected Unstable angina    #NSTEMI  - s/p cath 3/22:  double vessel coronary artery disease.  Successful PCI of Proximal Circumflex/OM1  - c/w ASA, Brilinta, Statin, BB and risk factor modification     #CAD s/p CABG, s/p PCI in the past  - c/w ASA, Brilinta, Statin, BB and risk factor modification     #Htn  - c/w losartan , metoprolol   62 year old male, past medical history cad s/p 3 stents, CABG (15 years ago), 2X MI (1 year ago and 2 year ago), htn, hld, dm, gerd, gastric ulcers, who presents with Chest pain. At baseline, patient is independent in ambulation.    Complete med rec in AM- [facility]    #Suspected Unstable angina  #H/o 2 episodes of ACS s/p 3 stent, s/p CABG- 13 years ago  -EKG similar to previous admission, Trops -ve, No chest pain at rest  -Repeat EKG AM  -Trend Trops  -Cardiology c/s- Dr. Lam (Dr. Cardozo group)  -Aspirin 325mg X1  -c/w Aspirin, Brilinta, Atorvastatin, Metoprolol, ARB  -Hold off on AC for now  -Start LR@60cc/hr  -NPO after midnight for possible Cath    #HTN- c/w losartan , metoprolol  #H/o Gastric ulcers- Start Protonix    #Misc  -Routine Lab frequency: Trend Trops  -DVT prophylaxis: Lovenox  -GI prophylaxis: Protonix  -Diet: DASH  -Code status: Full code  -Activity: IAT  -Dispo: Tele

## 2023-02-27 NOTE — ED PROVIDER NOTE - ATTENDING APP SHARED VISIT CONTRIBUTION OF CARE
62-year-old male history of HLD, DM, CAD status post CABG, GERD/gastritis now presents with chest pressure for the past several days, intermittent, worse with light exertion, associated shortness of breath, denies fever, nausea, vomiting, diaphoresis, hemoptysis, orthopnea, PND, LE pain or swelling, recent immobilization or travel or other associated complaints at present. Old chart reviewed. I have reviewed and agree with the initial nursing note, except as documented in my note.    VSS, awake, alert, non-toxic appearing, oropharynx clear, mmm, no JVD or bruit, no skin rash or lesions, chest CTAB, non-labored breathing, no w/r/r, +S1/S2, RRR, no m/r/g, abdomen soft, NT, ND, +BS, no organomegaly or pulsatile masses, no peripheral edema or deformities, equal pulses upper and lower extremities, alert, clear speech and steady gait.

## 2023-02-27 NOTE — ED PROVIDER NOTE - OBJECTIVE STATEMENT
62 year old male, past medical history cad s/p 3 stents, CABG, 2 MI, htn, hld, dm, gerd, gastric ulcers, who presents with shortness of breath. 62 year old male, past medical history cad s/p 3 stents, CABG, 2 MI, htn, hld, dm, gerd, gastric ulcers, who presents with shortness of breath. patient with exertional shortness of breath that began x1 week ago, worsening since. patient also with central chest tightness/pressure, intermittent. patient was @ store today when he felt diaphoretic, pale and chest discomfort prompting ed eval. denies fever, chills, hemoptysis, abd pain, back pain, nausea/vomiting/diarrhea, urinary symptoms, syncope.

## 2023-02-27 NOTE — ED PROVIDER NOTE - INTERPRETATION
EKG: nsr at 60 bpm, normal intervals, inverted t waves V5-V6, otherwise no contiguous lead changes suspicious for ischemia

## 2023-02-27 NOTE — H&P ADULT - HISTORY OF PRESENT ILLNESS
62 year old male, past medical history cad s/p 3 stents, CABG, 2 MI, htn, hld, dm, gerd, gastric ulcers, who presents with shortness of breath. patient with exertional shortness of breath that began x1 week ago, worsening since. patient also with central chest tightness/pressure, intermittent. patient was @ store today when he felt diaphoretic, pale and chest discomfort prompting ed eval. denies fever, chills, hemoptysis, abd pain, back pain, nausea/vomiting/diarrhea, urinary symptoms, syncope.    Vital Signs Last 24 Hrs:  T(F): 98 (27 Feb 2023 14:37), Max: 98 (27 Feb 2023 14:37)  HR: 66 (27 Feb 2023 14:37) (66 - 66)  BP: 148/91 (27 Feb 2023 14:37) (148/91 - 148/91)  RR: 17 (27 Feb 2023 14:37) (17 - 17)  SpO2: 98% (27 Feb 2023 14:37) (98% - 98%) on RA   62 year old male, past medical history cad s/p 3 stents, CABG (15 years ago), 2X MI (1 year ago and 2 year ago), htn, hld, dm, gerd, gastric ulcers, who presents with Chest pain. At baseline, patient is independent in ambulation.    Patient was in his usual state of health 4 weeks ago when he started developing chest pain, pressure like quality, 6/10 intensity at it's worst, 4-5 episodes per week, occurs during walking only, aggravated with exertion, relieved with rest, radiating to neck with tingling sensation in B/L arms and somewhat similar to previous episode of MI. Patient also developed 2 episodes of SOB with the chest pain with exertion in last 3 days associated with diaphoresis and getting pale, which is new for him. As symptoms were progressing patient presented to ED. Denies HA, dizziness, NVD, fever, abdominal pain, change in urination and change in bowel habits.     In the ED, EKG shows signs of lateral ischemia but similar to previous EKG from last admission, trops -ve X1. No chest pain during my interview.    Vital Signs Last 24 Hrs:  T(F): 98 (27 Feb 2023 14:37), Max: 98 (27 Feb 2023 14:37)  HR: 66 (27 Feb 2023 14:37) (66 - 66)  BP: 148/91 (27 Feb 2023 14:37) (148/91 - 148/91)  RR: 17 (27 Feb 2023 14:37) (17 - 17)  SpO2: 98% (27 Feb 2023 14:37) (98% - 98%) on RA

## 2023-02-27 NOTE — ED PROVIDER NOTE - CLINICAL SUMMARY MEDICAL DECISION MAKING FREE TEXT BOX
Patient's labs WNL, cardiac enzymes and EKG non-diagnostic, atypical presentation for PE, dissection, pneumothorax (not visualized on chest xr), pericarditis, tamponade, pneumonia (no infectious symptoms, clear chest xr), myocarditis (no recent illness, neg trop). Exam without evidence of volume overload so doubt heart failure. Plan to place patient in TELE for risk stratification, further evaluation and management.

## 2023-02-28 LAB
A1C WITH ESTIMATED AVERAGE GLUCOSE RESULT: 6.2 % — HIGH (ref 4–5.6)
ALBUMIN SERPL ELPH-MCNC: 3.6 G/DL — SIGNIFICANT CHANGE UP (ref 3.5–5.2)
ALP SERPL-CCNC: 73 U/L — SIGNIFICANT CHANGE UP (ref 30–115)
ALT FLD-CCNC: 24 U/L — SIGNIFICANT CHANGE UP (ref 0–41)
ANION GAP SERPL CALC-SCNC: 10 MMOL/L — SIGNIFICANT CHANGE UP (ref 7–14)
APTT BLD: 40.5 SEC — HIGH (ref 27–39.2)
AST SERPL-CCNC: 18 U/L — SIGNIFICANT CHANGE UP (ref 0–41)
BASOPHILS # BLD AUTO: 0.08 K/UL — SIGNIFICANT CHANGE UP (ref 0–0.2)
BASOPHILS NFR BLD AUTO: 0.8 % — SIGNIFICANT CHANGE UP (ref 0–1)
BILIRUB SERPL-MCNC: 1.4 MG/DL — HIGH (ref 0.2–1.2)
BUN SERPL-MCNC: 24 MG/DL — HIGH (ref 10–20)
CALCIUM SERPL-MCNC: 9.1 MG/DL — SIGNIFICANT CHANGE UP (ref 8.4–10.4)
CHLORIDE SERPL-SCNC: 105 MMOL/L — SIGNIFICANT CHANGE UP (ref 98–110)
CHOLEST SERPL-MCNC: 146 MG/DL — SIGNIFICANT CHANGE UP
CO2 SERPL-SCNC: 26 MMOL/L — SIGNIFICANT CHANGE UP (ref 17–32)
CREAT SERPL-MCNC: 1.1 MG/DL — SIGNIFICANT CHANGE UP (ref 0.7–1.5)
EGFR: 77 ML/MIN/1.73M2 — SIGNIFICANT CHANGE UP
EOSINOPHIL # BLD AUTO: 0.42 K/UL — SIGNIFICANT CHANGE UP (ref 0–0.7)
EOSINOPHIL NFR BLD AUTO: 4 % — SIGNIFICANT CHANGE UP (ref 0–8)
ESTIMATED AVERAGE GLUCOSE: 131 MG/DL — HIGH (ref 68–114)
GLUCOSE BLDC GLUCOMTR-MCNC: 121 MG/DL — HIGH (ref 70–99)
GLUCOSE BLDC GLUCOMTR-MCNC: 178 MG/DL — HIGH (ref 70–99)
GLUCOSE BLDC GLUCOMTR-MCNC: 90 MG/DL — SIGNIFICANT CHANGE UP (ref 70–99)
GLUCOSE SERPL-MCNC: 103 MG/DL — HIGH (ref 70–99)
HCT VFR BLD CALC: 41.1 % — LOW (ref 42–52)
HDLC SERPL-MCNC: 30 MG/DL — LOW
HGB BLD-MCNC: 13 G/DL — LOW (ref 14–18)
IMM GRANULOCYTES NFR BLD AUTO: 0.5 % — HIGH (ref 0.1–0.3)
INR BLD: 0.97 RATIO — SIGNIFICANT CHANGE UP (ref 0.65–1.3)
LIPID PNL WITH DIRECT LDL SERPL: 82 MG/DL — SIGNIFICANT CHANGE UP
LYMPHOCYTES # BLD AUTO: 3.87 K/UL — HIGH (ref 1.2–3.4)
LYMPHOCYTES # BLD AUTO: 36.7 % — SIGNIFICANT CHANGE UP (ref 20.5–51.1)
MAGNESIUM SERPL-MCNC: 2.3 MG/DL — SIGNIFICANT CHANGE UP (ref 1.8–2.4)
MCHC RBC-ENTMCNC: 26.7 PG — LOW (ref 27–31)
MCHC RBC-ENTMCNC: 31.6 G/DL — LOW (ref 32–37)
MCV RBC AUTO: 84.4 FL — SIGNIFICANT CHANGE UP (ref 80–94)
MONOCYTES # BLD AUTO: 1.17 K/UL — HIGH (ref 0.1–0.6)
MONOCYTES NFR BLD AUTO: 11.1 % — HIGH (ref 1.7–9.3)
NEUTROPHILS # BLD AUTO: 4.95 K/UL — SIGNIFICANT CHANGE UP (ref 1.4–6.5)
NEUTROPHILS NFR BLD AUTO: 46.9 % — SIGNIFICANT CHANGE UP (ref 42.2–75.2)
NON HDL CHOLESTEROL: 116 MG/DL — SIGNIFICANT CHANGE UP
NRBC # BLD: 0 /100 WBCS — SIGNIFICANT CHANGE UP (ref 0–0)
PLATELET # BLD AUTO: 275 K/UL — SIGNIFICANT CHANGE UP (ref 130–400)
POTASSIUM SERPL-MCNC: 3.6 MMOL/L — SIGNIFICANT CHANGE UP (ref 3.5–5)
POTASSIUM SERPL-SCNC: 3.6 MMOL/L — SIGNIFICANT CHANGE UP (ref 3.5–5)
PROT SERPL-MCNC: 6.5 G/DL — SIGNIFICANT CHANGE UP (ref 6–8)
PROTHROM AB SERPL-ACNC: 11.1 SEC — SIGNIFICANT CHANGE UP (ref 9.95–12.87)
RBC # BLD: 4.87 M/UL — SIGNIFICANT CHANGE UP (ref 4.7–6.1)
RBC # FLD: 12.9 % — SIGNIFICANT CHANGE UP (ref 11.5–14.5)
SODIUM SERPL-SCNC: 141 MMOL/L — SIGNIFICANT CHANGE UP (ref 135–146)
TRIGL SERPL-MCNC: 174 MG/DL — HIGH
TROPONIN T SERPL-MCNC: <0.01 NG/ML — SIGNIFICANT CHANGE UP
TROPONIN T SERPL-MCNC: <0.01 NG/ML — SIGNIFICANT CHANGE UP
WBC # BLD: 10.54 K/UL — SIGNIFICANT CHANGE UP (ref 4.8–10.8)
WBC # FLD AUTO: 10.54 K/UL — SIGNIFICANT CHANGE UP (ref 4.8–10.8)

## 2023-02-28 PROCEDURE — 99232 SBSQ HOSP IP/OBS MODERATE 35: CPT

## 2023-02-28 PROCEDURE — 93010 ELECTROCARDIOGRAM REPORT: CPT

## 2023-02-28 RX ORDER — SODIUM CHLORIDE 9 MG/ML
1000 INJECTION, SOLUTION INTRAVENOUS
Refills: 0 | Status: DISCONTINUED | OUTPATIENT
Start: 2023-02-28 | End: 2023-02-28

## 2023-02-28 RX ADMIN — PANTOPRAZOLE SODIUM 40 MILLIGRAM(S): 20 TABLET, DELAYED RELEASE ORAL at 06:38

## 2023-02-28 RX ADMIN — Medication 81 MILLIGRAM(S): at 17:45

## 2023-02-28 RX ADMIN — Medication 25 MILLIGRAM(S): at 06:38

## 2023-02-28 RX ADMIN — TICAGRELOR 90 MILLIGRAM(S): 90 TABLET ORAL at 07:11

## 2023-02-28 RX ADMIN — TICAGRELOR 90 MILLIGRAM(S): 90 TABLET ORAL at 17:45

## 2023-02-28 RX ADMIN — Medication 25 MILLIGRAM(S): at 20:51

## 2023-02-28 RX ADMIN — LOSARTAN POTASSIUM 50 MILLIGRAM(S): 100 TABLET, FILM COATED ORAL at 06:38

## 2023-02-28 RX ADMIN — Medication 1: at 11:27

## 2023-02-28 NOTE — PROGRESS NOTE ADULT - SUBJECTIVE AND OBJECTIVE BOX
KATH MCCOLLUM  62y  [facility]      Patient is a 62y old  Male who presents with a chief complaint of     INTERVAL HPI/OVERNIGHT EVENTS:        REVIEW OF SYSTEMS:        FAMILY HISTORY:  FH: CAD (coronary artery disease) (Grandparent)      T(C): 36 (02-27-23 @ 23:46), Max: 36.7 (02-27-23 @ 14:37)  HR: 55 (02-28-23 @ 06:41) (55 - 75)  BP: 117/71 (02-28-23 @ 06:41) (117/71 - 148/91)  RR: 18 (02-28-23 @ 06:41) (17 - 18)  SpO2: 99% (02-28-23 @ 06:41) (98% - 99%)  Wt(kg): --Vital Signs Last 24 Hrs  T(C): 36 (27 Feb 2023 23:46), Max: 36.7 (27 Feb 2023 14:37)  T(F): 96.8 (27 Feb 2023 23:46), Max: 98 (27 Feb 2023 14:37)  HR: 55 (28 Feb 2023 06:41) (55 - 75)  BP: 117/71 (28 Feb 2023 06:41) (117/71 - 148/91)  BP(mean): --  RR: 18 (28 Feb 2023 06:41) (17 - 18)  SpO2: 99% (28 Feb 2023 06:41) (98% - 99%)    Parameters below as of 28 Feb 2023 06:41  Patient On (Oxygen Delivery Method): room air        PHYSICAL EXAM:  GENERAL: NAD, well-groomed, well-developed  HEAD:  Atraumatic, Normocephalic  EYES: EOMI, PERRLA, conjunctiva and sclera clear  ENMT: No tonsillar erythema, exudates, or enlargement; Moist mucous membranes, Good dentition, No lesions  NECK: Supple, No JVD, Normal thyroid  NERVOUS SYSTEM:  Alert & Oriented X3, Good concentration; Motor Strength 5/5 B/L upper and lower extremities; DTRs 2+ intact and symmetric  PULM: Clear to auscultation bilaterally  CARDIAC: Regular rate and rhythm; No murmurs, rubs, or gallops  GI: Soft, Nontender, Nondistended; Bowel sounds present  EXTREMITIES:  2+ Peripheral Pulses, No clubbing, cyanosis, or edema  LYMPH: No lymphadenopathy noted  SKIN: No rashes or lesions    Consultant(s) Notes Reviewed:  [x ] YES  [ ] NO  Care Discussed with Consultants/Other Providers [ x] YES  [ ] NO    LABS:                            13.0   10.54 )-----------( 275      ( 28 Feb 2023 06:17 )             41.1   02-27    139  |  101  |  22<H>  ----------------------------<  97  4.1   |  27  |  1.3    Ca    10.0      27 Feb 2023 15:49  Mg     1.9     02-27    TPro  7.3  /  Alb  4.3  /  TBili  2.0<H>  /  DBili  x   /  AST  22  /  ALT  27  /  AlkPhos  85  02-27            aspirin enteric coated 81 milliGRAM(s) Oral daily  atorvastatin 80 milliGRAM(s) Oral at bedtime  dextrose 5%. 1000 milliLiter(s) IV Continuous <Continuous>  dextrose 5%. 1000 milliLiter(s) IV Continuous <Continuous>  dextrose 50% Injectable 25 Gram(s) IV Push once  dextrose 50% Injectable 12.5 Gram(s) IV Push once  dextrose 50% Injectable 25 Gram(s) IV Push once  dextrose Oral Gel 15 Gram(s) Oral once PRN  enoxaparin Injectable 40 milliGRAM(s) SubCutaneous every 24 hours  glucagon  Injectable 1 milliGRAM(s) IntraMuscular once  insulin lispro (ADMELOG) corrective regimen sliding scale   SubCutaneous three times a day before meals  lactated ringers. 1000 milliLiter(s) IV Continuous <Continuous>  losartan 50 milliGRAM(s) Oral daily  metoprolol tartrate 25 milliGRAM(s) Oral two times a day  pantoprazole    Tablet 40 milliGRAM(s) Oral before breakfast  ticagrelor 90 milliGRAM(s) Oral every 12 hours      62 year old male, past medical history cad s/p 3 stents, CABG (15 years ago), 2X MI (1 year ago and 2 year ago), htn, hld, dm, gerd, gastric ulcers, who presents with Chest pain. At baseline, patient is independent in ambulation.      1.Chest pain worse now concerning for unstable angina. Hx of ACS s/p 3 stent   - Telemetry                - ECG:TWI but no changes                - CXR :WNL   - Cont aspirin and brilinta  - Cont atorvastatin                 - Lipid profile   - Cont metoprolol  - NPO   - Holding anticoagulation for possible cath   - Cardio consult:pending     2. HTN  - c/w losartan , metoprolol    3. H/o Gastric ulcers- Start Protonix    #Misc  -Routine Lab frequency: Trend Trops  -DVT prophylaxis: Lovenox  -GI prophylaxis: Protonix  -Diet: DASH  -Code status: Full code  -Activity: IAT  -Dispo: Tele          Case Discussed with House Staff   39  minutes spent on total encounter; more than 50% of the visit was spent counseling and/or coordinating care by the attending physician.   Spectra [phone]     KATH MCCOLLUM  62y  [facility]      Patient is a 62y old  Male who presents with a chief complaint of     INTERVAL HPI/OVERNIGHT EVENTS:  Patient looks comfortable  he reported that his chest pain is new and started last thursday and sometimes lasted up to 30 min  no other complains noted   cardio consult noted   no chest pain now             FAMILY HISTORY:  FH: CAD (coronary artery disease) (Grandparent)      T(C): 36 (02-27-23 @ 23:46), Max: 36.7 (02-27-23 @ 14:37)  HR: 55 (02-28-23 @ 06:41) (55 - 75)  BP: 117/71 (02-28-23 @ 06:41) (117/71 - 148/91)  RR: 18 (02-28-23 @ 06:41) (17 - 18)  SpO2: 99% (02-28-23 @ 06:41) (98% - 99%)  Wt(kg): --Vital Signs Last 24 Hrs  T(C): 36 (27 Feb 2023 23:46), Max: 36.7 (27 Feb 2023 14:37)  T(F): 96.8 (27 Feb 2023 23:46), Max: 98 (27 Feb 2023 14:37)  HR: 55 (28 Feb 2023 06:41) (55 - 75)  BP: 117/71 (28 Feb 2023 06:41) (117/71 - 148/91)  BP(mean): --  RR: 18 (28 Feb 2023 06:41) (17 - 18)  SpO2: 99% (28 Feb 2023 06:41) (98% - 99%)    Parameters below as of 28 Feb 2023 06:41  Patient On (Oxygen Delivery Method): room air        PHYSICAL EXAM:  GENERAL: NAD, well-groomed, well-developed  NERVOUS SYSTEM:  Alert & Oriented X3,  PULM: Clear to auscultation bilaterally  CARDIAC: Regular rate and rhythm;  GI: Soft, Nontender, Nondistended; Bowel sounds present  EXTREMITIES:  2+ Peripheral Pulses,       Consultant(s) Notes Reviewed:  [x ] YES  [ ] NO  Care Discussed with Consultants/Other Providers [ x] YES  [ ] NO    LABS:                            13.0   10.54 )-----------( 275      ( 28 Feb 2023 06:17 )             41.1   02-27    139  |  101  |  22<H>  ----------------------------<  97  4.1   |  27  |  1.3    Ca    10.0      27 Feb 2023 15:49  Mg     1.9     02-27    TPro  7.3  /  Alb  4.3  /  TBili  2.0<H>  /  DBili  x   /  AST  22  /  ALT  27  /  AlkPhos  85  02-27            aspirin enteric coated 81 milliGRAM(s) Oral daily  atorvastatin 80 milliGRAM(s) Oral at bedtime  dextrose 5%. 1000 milliLiter(s) IV Continuous <Continuous>  dextrose 5%. 1000 milliLiter(s) IV Continuous <Continuous>  dextrose 50% Injectable 25 Gram(s) IV Push once  dextrose 50% Injectable 12.5 Gram(s) IV Push once  dextrose 50% Injectable 25 Gram(s) IV Push once  dextrose Oral Gel 15 Gram(s) Oral once PRN  enoxaparin Injectable 40 milliGRAM(s) SubCutaneous every 24 hours  glucagon  Injectable 1 milliGRAM(s) IntraMuscular once  insulin lispro (ADMELOG) corrective regimen sliding scale   SubCutaneous three times a day before meals  lactated ringers. 1000 milliLiter(s) IV Continuous <Continuous>  losartan 50 milliGRAM(s) Oral daily  metoprolol tartrate 25 milliGRAM(s) Oral two times a day  pantoprazole    Tablet 40 milliGRAM(s) Oral before breakfast  ticagrelor 90 milliGRAM(s) Oral every 12 hours      62 year old male, past medical history cad s/p 3 stents, CABG (15 years ago), 2X MI (1 year ago and 2 year ago), htn, hld, dm, gerd, gastric ulcers, who presents with Chest pain. At baseline, patient is independent in ambulation.      1.New onset Chest pain concerning for unstable angina. Hx of ACS s/p 3 stent   - Telemetry                - ECG:TWI but no changes                - CXR :WNL   - Cont aspirin and brilinta  - Cont atorvastatin                 - Lipid profile   - Cont metoprolol  - NPO after midnight   - Cardiac cath in am:pending   - Cardio consult appreciated     2. HTN  - c/w losartan , metoprolol    3. H/o Gastric ulcers  - Start Protonix    #Misc  -DVT prophylaxis: Lovenox  -GI prophylaxis: Protonix  -Diet: DASH  -Code status: Full code  -Activity: IAT  -Dispo: Tele          Case Discussed with House Staff   39  minutes spent on total encounter; more than 50% of the visit was spent counseling and/or coordinating care by the attending physician.   [facility] [phone]

## 2023-02-28 NOTE — CONSULT NOTE ADULT - PROVIDER SPECIALTY LIST ADULT
Cardiology Detail Level: Detailed Quality 130: Documentation Of Current Medications In The Medical Record: Current Medications Documented Quality 110: Preventive Care And Screening: Influenza Immunization: Influenza Immunization previously received during influenza season

## 2023-02-28 NOTE — CONSULT NOTE ADULT - ASSESSMENT
Unstable angina  CAD s/p CABG and PCI to LCX and LM   Medical noncompliance   - Managemenrt as per primary team  - Continue DAPT  - NPO > MN for [facility] tomorrow   - Will follow

## 2023-02-28 NOTE — CONSULT NOTE ADULT - SUBJECTIVE AND OBJECTIVE BOX
Patient is a 62y old  Male who presents with a chief complaint of chest pain on exertion     HPI:  62 year old male, past medical history cad s/p 3 stents, CABG (15 years ago), 2X MI (1 year ago and 2 year ago), htn, hld, dm, gerd, gastric ulcers, who presents with Chest pain. At baseline, patient is independent in ambulation.    Patient was in his usual state of health 4 weeks ago when he started developing chest pain, pressure like quality, 6/10 intensity at it's worst, 4-5 episodes per week, occurs during walking only, aggravated with exertion, relieved with rest, radiating to neck with tingling sensation in B/L arms and somewhat similar to previous episode of MI. Patient also developed 2 episodes of SOB with the chest pain with exertion in last 3 days associated with diaphoresis and getting pale, which is new for him. As symptoms were progressing patient presented to ED. Denies HA, dizziness, NVD, fever, abdominal pain, change in urination and change in bowel habits.     In the ED, EKG shows signs of lateral ischemia but similar to previous EKG from last admission, trops -ve X1.    Vital Signs Last 24 Hrs:  T(F): 98 (27 Feb 2023 14:37), Max: 98 (27 Feb 2023 14:37)  HR: 66 (27 Feb 2023 14:37) (66 - 66)  BP: 148/91 (27 Feb 2023 14:37) (148/91 - 148/91)  RR: 17 (27 Feb 2023 14:37) (17 - 17)  SpO2: 98% (27 Feb 2023 14:37) (98% - 98%) on RA   (27 Feb 2023 20:26)      PAST MEDICAL & SURGICAL HISTORY:  GERD (gastroesophageal reflux disease)      High cholesterol      Chronic gastric ulcer without hemorrhage and without perforation      Ulcer of esophagus      CAD S/P percutaneous coronary angioplasty      DM (diabetes mellitus)      Hiatal hernia with GERD      Coronary artery disease involving other coronary artery bypass graft, angina presence unspecified      Cardiac arrhythmia  bypass surgery 9 years ago      H/O right coronary artery stent placement  August 17, 2020          PREVIOUS DIAGNOSTIC TESTING:      ECHO  FINDINGS:    STRESS  FINDINGS:    CATHETERIZATION  FINDINGS:    MEDICATIONS  (STANDING):  aspirin enteric coated 81 milliGRAM(s) Oral daily  atorvastatin 80 milliGRAM(s) Oral at bedtime  dextrose 5% + sodium chloride 0.45%. 1000 milliLiter(s) (100 mL/Hr) IV Continuous <Continuous>  dextrose 5%. 1000 milliLiter(s) (100 mL/Hr) IV Continuous <Continuous>  dextrose 5%. 1000 milliLiter(s) (50 mL/Hr) IV Continuous <Continuous>  dextrose 50% Injectable 25 Gram(s) IV Push once  dextrose 50% Injectable 12.5 Gram(s) IV Push once  dextrose 50% Injectable 25 Gram(s) IV Push once  enoxaparin Injectable 40 milliGRAM(s) SubCutaneous every 24 hours  glucagon  Injectable 1 milliGRAM(s) IntraMuscular once  insulin lispro (ADMELOG) corrective regimen sliding scale   SubCutaneous three times a day before meals  losartan 50 milliGRAM(s) Oral daily  metoprolol tartrate 25 milliGRAM(s) Oral two times a day  pantoprazole    Tablet 40 milliGRAM(s) Oral before breakfast  ticagrelor 90 milliGRAM(s) Oral every 12 hours    MEDICATIONS  (PRN):  dextrose Oral Gel 15 Gram(s) Oral once PRN Blood Glucose LESS THAN 70 milliGRAM(s)/deciliter      FAMILY HISTORY:  FH: CAD (coronary artery disease) (Grandparent)        SOCIAL HISTORY:  CIGARETTES:    ALCOHOL:    REVIEW OF SYSTEMS:  CONSTITUTIONAL: No fever, weight loss, or fatigue  NECK: No pain or stiffness  RESPIRATORY: No cough, wheezing, chills or hemoptysis; No shortness of breath  CARDIOVASCULAR:  chest pain on exertion, no palpitations, dizziness, or leg swelling  GASTROINTESTINAL: No abdominal or epigastric pain. No nausea, vomiting, or hematemesis; No diarrhea or constipation. No melena or hematochezia.  GENITOURINARY: No dysuria, frequency, hematuria, or incontinence  NEUROLOGICAL: No headaches, memory loss, loss of strength, numbness, or tremors  SKIN: No itching, burning, rashes, or lesions   ENDOCRINE: No heat or cold intolerance; No hair loss  MUSCULOSKELETAL: No joint pain or swelling; No muscle, back, or extremity pain  HEME/LYMPH: No easy bruising, or bleeding gums          Vital Signs Last 24 Hrs  T(C): 36 (27 Feb 2023 23:46), Max: 36.7 (27 Feb 2023 14:37)  T(F): 96.8 (27 Feb 2023 23:46), Max: 98 (27 Feb 2023 14:37)  HR: 55 (28 Feb 2023 06:41) (55 - 75)  BP: 117/71 (28 Feb 2023 06:41) (117/71 - 148/91)  BP(mean): --  RR: 18 (28 Feb 2023 06:41) (17 - 18)  SpO2: 99% (28 Feb 2023 06:41) (98% - 99%)    Parameters below as of 28 Feb 2023 06:41  Patient On (Oxygen Delivery Method): room air            PHYSICAL EXAM:  GENERAL: NAD, well-groomed, well-developed  HEAD:  Atraumatic, Normocephalic  NECK: Supple, No JVD, Normal thyroid  NERVOUS SYSTEM:  Alert & Oriented X3, Good concentration  CHEST/LUNG: Clear to percussion bilaterally; No rales, rhonchi, wheezing, or rubs  HEART: Regular rate and rhythm  ABDOMEN: Soft, Nontender, Nondistended; Bowel sounds present  EXTREMITIES:  2+ Peripheral Pulses, No clubbing, cyanosis, or edema  SKIN: No rashes or lesions    INTERPRETATION OF TELEMETRY:    ECG:    I&O's Detail      LABS:                        13.0   10.54 )-----------( 275      ( 28 Feb 2023 06:17 )             41.1     02-28    141  |  105  |  24<H>  ----------------------------<  103<H>  3.6   |  26  |  1.1    Ca    9.1      28 Feb 2023 06:17  Mg     2.3     02-28    TPro  6.5  /  Alb  3.6  /  TBili  1.4<H>  /  DBili  x   /  AST  18  /  ALT  24  /  AlkPhos  73  02-28    CARDIAC MARKERS ( 28 Feb 2023 06:17 )  x     / <0.01 ng/mL / x     / x     / x      CARDIAC MARKERS ( 27 Feb 2023 15:49 )  x     / <0.01 ng/mL / x     / x     / x      CARDIAC MARKERS ( 27 Feb 2023 00:57 )  x     / <0.01 ng/mL / x     / x     / x          PT/INR - ( 28 Feb 2023 06:17 )   PT: 11.10 sec;   INR: 0.97 ratio         PTT - ( 28 Feb 2023 06:17 )  PTT:40.5 sec    I&O's Summary      RADIOLOGY & ADDITIONAL STUDIES:        losartan 50 milliGRAM(s) Oral daily  metoprolol tartrate 25 milliGRAM(s) Oral two times a day

## 2023-03-01 ENCOUNTER — TRANSCRIPTION ENCOUNTER (OUTPATIENT)
Age: 63
End: 2023-03-01

## 2023-03-01 VITALS
DIASTOLIC BLOOD PRESSURE: 67 MMHG | HEART RATE: 51 BPM | OXYGEN SATURATION: 97 % | RESPIRATION RATE: 18 BRPM | SYSTOLIC BLOOD PRESSURE: 143 MMHG

## 2023-03-01 LAB — GLUCOSE BLDC GLUCOMTR-MCNC: 94 MG/DL — SIGNIFICANT CHANGE UP (ref 70–99)

## 2023-03-01 PROCEDURE — 99239 HOSP IP/OBS DSCHRG MGMT >30: CPT

## 2023-03-01 RX ORDER — FUROSEMIDE 40 MG
1 TABLET ORAL
Qty: 30 | Refills: 0
Start: 2023-03-01 | End: 2023-03-30

## 2023-03-01 RX ORDER — SODIUM CHLORIDE 9 MG/ML
1000 INJECTION INTRAMUSCULAR; INTRAVENOUS; SUBCUTANEOUS
Refills: 0 | Status: DISCONTINUED | OUTPATIENT
Start: 2023-03-01 | End: 2023-03-01

## 2023-03-01 RX ADMIN — Medication 25 MILLIGRAM(S): at 05:49

## 2023-03-01 RX ADMIN — TICAGRELOR 90 MILLIGRAM(S): 90 TABLET ORAL at 08:54

## 2023-03-01 RX ADMIN — LOSARTAN POTASSIUM 50 MILLIGRAM(S): 100 TABLET, FILM COATED ORAL at 05:48

## 2023-03-01 RX ADMIN — Medication 81 MILLIGRAM(S): at 08:53

## 2023-03-01 RX ADMIN — PANTOPRAZOLE SODIUM 40 MILLIGRAM(S): 20 TABLET, DELAYED RELEASE ORAL at 05:49

## 2023-03-01 NOTE — DISCHARGE NOTE PROVIDER - PROVIDER TOKENS
PROVIDER:[TOKEN:[[id number]],FOLLOWUP:[2 weeks]],PROVIDER:[TOKEN:[[id number]],FOLLOWUP:[1 week]]

## 2023-03-01 NOTE — DISCHARGE NOTE NURSING/CASE MANAGEMENT/SOCIAL WORK - NSDCPEFALRISK_GEN_ALL_CORE
For information on Fall & Injury Prevention, visit: [email or web address] OR  [email or web address] OR  https://www.cdc.gov/steadi/patient.html

## 2023-03-01 NOTE — DISCHARGE NOTE PROVIDER - NSDCMRMEDTOKEN_GEN_ALL_CORE_FT
aspirin 81 mg oral delayed release tablet: 1 tab(s) orally once a day  atorvastatin 80 mg oral tablet: 1 tab(s) orally once a day (at bedtime)  hydroCHLOROthiazide 12.5 mg oral capsule: 1 cap(s) orally once a day  Lasix 20 mg oral tablet: 1 tab(s) orally once a day   losartan 50 mg oral tablet: 1 tab(s) orally once a day  metoprolol tartrate 25 mg oral tablet: 1 tab(s) orally 2 times a day  ticagrelor 90 mg oral tablet: 1 tab(s) orally every 12 hours   aspirin 81 mg oral delayed release tablet: 1 tab(s) orally once a day  atorvastatin 80 mg oral tablet: 1 tab(s) orally once a day (at bedtime)  Lasix 20 mg oral tablet: 1 tab(s) orally once a day   losartan 50 mg oral tablet: 1 tab(s) orally once a day  metoprolol tartrate 25 mg oral tablet: 1 tab(s) orally 2 times a day  ticagrelor 90 mg oral tablet: 1 tab(s) orally every 12 hours

## 2023-03-01 NOTE — DISCHARGE NOTE PROVIDER - NSDCCPCAREPLAN_GEN_ALL_CORE_FT
PRINCIPAL DISCHARGE DIAGNOSIS  Diagnosis: Chest pain  Assessment and Plan of Treatment: You were admitted to the hospital after having chest pain. During your visit, you were evaluated by the cardio team and underwent a cardiac cath. It showed stable 3 vessel coronary disease. Please continue taking your home meds. You are also being started on lasix 20mg po daily. Please follow up with your primary care doctor and cardiologist.  Call your local emergency number (911 in the [place]), or have someone call if:   •You have any of the following signs of a heart attack: ?Squeezing, pressure, or pain in your chest  ?You may also have any of the following: ?Discomfort or pain in your back, neck, jaw, stomach, or arm  ?Shortness of breath  ?Nausea or vomiting  ?Lightheadedness or a sudden cold sweat  Seek care immediately if:   •You have chest pain that happens often.  •You have chest pain at rest.       PRINCIPAL DISCHARGE DIAGNOSIS  Diagnosis: Chest pain  Assessment and Plan of Treatment: Lasix 20mg has been prescribed. Discontinue hydrochlorothiazide.  You were admitted to the hospital after having chest pain. During your visit, you were evaluated by the cardio team and underwent a cardiac cath. It showed stable 3 vessel coronary disease. Please continue taking your home meds. You are also being started on lasix 20mg po daily. Please follow up with your primary care doctor and cardiologist.  Call your local emergency number (915 in the [place]), or have someone call if:   •You have any of the following signs of a heart attack: ?Squeezing, pressure, or pain in your chest  ?You may also have any of the following: ?Discomfort or pain in your back, neck, jaw, stomach, or arm  ?Shortness of breath  ?Nausea or vomiting  ?Lightheadedness or a sudden cold sweat  Seek care immediately if:   •You have chest pain that happens often.  •You have chest pain at rest.

## 2023-03-01 NOTE — CHART NOTE - NSCHARTNOTEFT_GEN_A_CORE
PRE-OP DIAGNOSIS:  HFrEF      PROCEDURE:     [X] Coronary Angiogram     [X] LHC     [X] LVG     [] RHC     [] Intervention (see below)         PHYSICIAN:  Dr. Phillips    ASSISTANT:  Dr. Jeter       PROCEDURE DESCRIPTION:     Consent:      [X] Patient     [] Family Member     []  Used        Anesthesia:     [] General     [] Sedation     [] Local        Access & Closure:     [] Fr Radial Artery     [X] 6 Fr Femoral Artery     [] Fr Femoral Vein     [] Fr Brachial Vein       IV Contrast: 80 mL        Intervention: None      Implants: None       FINDINGS:     Coronary Dominance: Right Dominate      LM: Patent prior stent to LM    LAD: Diffuse severe disease with competitive flow from LIMA    CX: Patent prior stents. Supply collaterals to RPDA    RCA: Severe diffuse disease to RCA with  at distal segment    LIMA: Patent CHRISTIANSON to LAD      LVEDP: 16 mmHg     EF: 35 %        ESTIMATED BLOOD LOSS: < 10 mL        CONDITION:     [X] Good     [] Fair     [] Critical        SPECIMEN REMOVED: N/A       POST-OP DIAGNOSIS:      [] Normal Coronary Angiogram     [] Mild Coronary Artery Disease (< 50% stenosis)     [X] 3 Vessel Coronary Artery Disease with patent LIMA to LAD       PLAN OF CARE:     - Stable 3V coronary disease  - IVF NS 100cc/hr x 5hr  - Cont Aspirin, Brilinta, Losartan, metoprolol and atorvastatin  - Start lasix 20mg PO daily  - Can discharge home today

## 2023-03-01 NOTE — CHART NOTE - NSCHARTNOTEFT_GEN_A_CORE
PREOPERATIVE DAY OF PROCEDURE EVALUATION:  I have personally seen and examined the patient.  I agree with the history and physical which I have reviewed and noted any changes below.  (Signed electronically by __________)  03-01-23 @ 08:47      Cath Bleeding Risk: 1.6%    Prehydration:  ml bolus x 1 hr prior to cardiac cath

## 2023-03-01 NOTE — DISCHARGE NOTE PROVIDER - HOSPITAL COURSE
62 year old male, past medical history cad s/p 3 stents, CABG (15 years ago), 2X MI (1 year ago and 2 year ago), htn, hld, dm, gerd, gastric ulcers, who presents with Chest pain. At baseline, patient is independent in ambulation.    Patient was in his usual state of health 4 weeks ago when he started developing chest pain, pressure like quality, 6/10 intensity at it's worst, 4-5 episodes per week, occurs during walking only, aggravated with exertion, relieved with rest, radiating to neck with tingling sensation in B/L arms and somewhat similar to previous episode of MI. Patient also developed 2 episodes of SOB with the chest pain with exertion in last 3 days associated with diaphoresis and getting pale, which is new for him. As symptoms were progressing patient presented to ED. Denies HA, dizziness, NVD, fever, abdominal pain, change in urination and change in bowel habits.     In the ED, EKG shows signs of lateral ischemia but similar to previous EKG from last admission, trops -ve X1.    Admitted for further workup. The following problems addressed during this admission:    #New onset Chest pain concerning for unstable angina. Hx of ACS s/p 3 stent   - Telemetry                - ECG:TWI but no changes                - CXR :WNL   - Cont aspirin and brilinta  - Cont atorvastatin                 - Lipid profile   - Cont metoprolol  - NPO after midnight   - s/p cath - 3 vessel coronary artery disease with patent LIMA to LAD  - c/w aspirin, brilinta, losartan, metoprolol, atorvastatin  - start lasix 20 daily po    #HTN  - c/w losartan , metoprolol    #H/o Gastric ulcers  - Start Protonix

## 2023-03-01 NOTE — DISCHARGE NOTE PROVIDER - CARE PROVIDER_API CALL
Vega ROSE)  Cardiology; Internal Medicine  [address] [place] [address]  Phone: [phone]  Fax: [phone]  Follow Up Time: 2 weeks    Conner Warner)  Internal Medicine  [address] [place] [address]  Phone: [phone]  Fax: [phone]  Follow Up Time: 1 week

## 2023-03-01 NOTE — DISCHARGE NOTE NURSING/CASE MANAGEMENT/SOCIAL WORK - PATIENT PORTAL LINK FT
You can access the FollowMyHealth Patient Portal offered by [facility] by registering at the following website: [email or web address]. By joining [employer]’s FollowMyHealth portal, you will also be able to view your health information using other applications (apps) compatible with our system.

## 2023-03-21 NOTE — DISCHARGE NOTE PROVIDER - REASON FOR NO CARDIAC REHABILITATION
Medical reason... Preparation Of Recipient Site - Flap Takedown: The eschar and granulation tissue was removed surgically with sharp dissection to facilitate appropriate healing after division and inset of the proximal and distal interpolation flap.

## 2023-07-07 PROBLEM — Z00.00 ENCOUNTER FOR PREVENTIVE HEALTH EXAMINATION: Status: ACTIVE | Noted: 2023-07-07

## 2023-07-11 ENCOUNTER — APPOINTMENT (OUTPATIENT)
Dept: CARDIOLOGY | Facility: CLINIC | Age: 63
End: 2023-07-11
Payer: MEDICAID

## 2023-07-11 VITALS
DIASTOLIC BLOOD PRESSURE: 90 MMHG | SYSTOLIC BLOOD PRESSURE: 142 MMHG | HEIGHT: 67 IN | HEART RATE: 69 BPM | BODY MASS INDEX: 30.76 KG/M2 | WEIGHT: 196 LBS

## 2023-07-11 DIAGNOSIS — R07.9 CHEST PAIN, UNSPECIFIED: ICD-10-CM

## 2023-07-11 DIAGNOSIS — Z95.5 PRESENCE OF CORONARY ANGIOPLASTY IMPLANT AND GRAFT: ICD-10-CM

## 2023-07-11 DIAGNOSIS — Z95.1 PRESENCE OF AORTOCORONARY BYPASS GRAFT: ICD-10-CM

## 2023-07-11 DIAGNOSIS — I25.10 ATHEROSCLEROTIC HEART DISEASE OF NATIVE CORONARY ARTERY W/OUT ANGINA PECTORIS: ICD-10-CM

## 2023-07-11 PROCEDURE — 99214 OFFICE O/P EST MOD 30 MIN: CPT | Mod: 25

## 2023-07-11 PROCEDURE — 93000 ELECTROCARDIOGRAM COMPLETE: CPT

## 2023-07-11 RX ORDER — ASPIRIN 81 MG
81 TABLET, DELAYED RELEASE (ENTERIC COATED) ORAL DAILY
Refills: 0 | Status: ACTIVE | COMMUNITY

## 2023-07-11 RX ORDER — METOPROLOL SUCCINATE 25 MG/1
25 TABLET, EXTENDED RELEASE ORAL DAILY
Refills: 3 | Status: ACTIVE | COMMUNITY

## 2023-07-11 RX ORDER — SACUBITRIL AND VALSARTAN 24; 26 MG/1; MG/1
24-26 TABLET, FILM COATED ORAL
Qty: 180 | Refills: 3 | Status: ACTIVE | COMMUNITY

## 2023-07-11 RX ORDER — SITAGLIPTIN AND METFORMIN HYDROCHLORIDE 50; 500 MG/1; MG/1
50-500 TABLET, FILM COATED ORAL TWICE DAILY
Refills: 0 | Status: ACTIVE | COMMUNITY

## 2023-07-11 RX ORDER — TICAGRELOR 90 MG/1
90 TABLET ORAL TWICE DAILY
Qty: 180 | Refills: 0 | Status: ACTIVE | COMMUNITY

## 2023-07-11 RX ORDER — FUROSEMIDE 20 MG/1
20 TABLET ORAL DAILY
Qty: 30 | Refills: 5 | Status: ACTIVE | COMMUNITY

## 2023-07-11 NOTE — DISCUSSION/SUMMARY
[FreeTextEntry1] : Continue medical therapy\par Routine visit in 6 weeks after retrieving records from Dr. Maryse jean

## 2023-07-11 NOTE — HISTORY OF PRESENT ILLNESS
[FreeTextEntry1] : cabg by dr mcadams 16 years ago\par s/p pci on 2 occasions\par ef of approx 40% on entresto\par class 22 sob, fatigue and chest pain\par \par Patient has difficulty in retrieving his medical records from Dr. Serra office.\par \par dr. galeano left dr. wiley\par \par Presently has class II chest pain as well as atypical chest pain because he is quite nervous and high strung.\par No real shortness of breath or congestive heart failure symptoms.\par He is on adequate medication with beta-blocker, and Entresto and diuretic for treatment of LV dysfunction which she states that his ejection fraction is about 35%.\par

## 2023-07-20 NOTE — DISCHARGE NOTE PROVIDER - EXTENDED VTE YES NO FOR MLM ENOXAPARIN
Is This A New Presentation, Or A Follow-Up?: Skin Lesions How Severe Is Your Skin Lesion?: moderate Have Your Skin Lesions Been Treated?: not been treated ,

## 2023-08-17 NOTE — PATIENT PROFILE ADULT - NSPROPTRIGHTNOTIFY_GEN_A_NUR
Mikey Galvez is a 29 year old male presenting for   Chief Complaint   Patient presents with   • Heart Problem   • Office Visit     Denies Latex allergy or sensitivity.    Medication verified and med list updated  Refills needed today: No    Health Maintenance Due   Topic Date Due   • COVID-19 Vaccine (1) Never done       Patient is due for topics as listed above but is not proceeding with Immunization(s) COVID-19 at this time. Education provided for Immunization(s) COVID-19.          Last lab results:   No results found for: \"HGBA1C\"  Cholesterol (mg/dL)   Date Value   08/15/2022 148     HDL (mg/dL)   Date Value   08/15/2022 62     Triglycerides (mg/dL)   Date Value   08/15/2022 42     LDL (mg/dL)   Date Value   08/15/2022 78     No results found for: \"URMIC\", \"UCR\", \"MALBCR\"  No results found for: \"IFOB\"               Depression Screening:  Review Flowsheet  More data exists       8/17/2023   PHQ 2/9 Score   Adult PHQ 2 Score 0   Adult PHQ 2 Interpretation No further screening needed   Little interest or pleasure in activity? Not at all   Feeling down, depressed or hopeless? Not at all        Advance Directives:  discussed and patient declined   declines

## 2023-08-24 ENCOUNTER — APPOINTMENT (OUTPATIENT)
Dept: CARDIOLOGY | Facility: CLINIC | Age: 63
End: 2023-08-24

## 2023-09-11 ENCOUNTER — OUTPATIENT (OUTPATIENT)
Dept: OUTPATIENT SERVICES | Facility: HOSPITAL | Age: 63
LOS: 1 days | End: 2023-09-11
Payer: MEDICAID

## 2023-09-11 VITALS
RESPIRATION RATE: 18 BRPM | DIASTOLIC BLOOD PRESSURE: 75 MMHG | HEIGHT: 67 IN | TEMPERATURE: 98 F | WEIGHT: 184.09 LBS | SYSTOLIC BLOOD PRESSURE: 108 MMHG | OXYGEN SATURATION: 99 % | HEART RATE: 63 BPM

## 2023-09-11 DIAGNOSIS — K80.20 CALCULUS OF GALLBLADDER WITHOUT CHOLECYSTITIS WITHOUT OBSTRUCTION: ICD-10-CM

## 2023-09-11 DIAGNOSIS — I49.9 CARDIAC ARRHYTHMIA, UNSPECIFIED: Chronic | ICD-10-CM

## 2023-09-11 DIAGNOSIS — Z01.818 ENCOUNTER FOR OTHER PREPROCEDURAL EXAMINATION: ICD-10-CM

## 2023-09-11 DIAGNOSIS — Z95.5 PRESENCE OF CORONARY ANGIOPLASTY IMPLANT AND GRAFT: Chronic | ICD-10-CM

## 2023-09-11 DIAGNOSIS — I25.810 ATHEROSCLEROSIS OF CORONARY ARTERY BYPASS GRAFT(S) WITHOUT ANGINA PECTORIS: Chronic | ICD-10-CM

## 2023-09-11 LAB
A1C WITH ESTIMATED AVERAGE GLUCOSE RESULT: 5.9 % — HIGH (ref 4–5.6)
ALBUMIN SERPL ELPH-MCNC: 4.3 G/DL — SIGNIFICANT CHANGE UP (ref 3.5–5.2)
ALP SERPL-CCNC: 111 U/L — SIGNIFICANT CHANGE UP (ref 30–115)
ALT FLD-CCNC: 30 U/L — SIGNIFICANT CHANGE UP (ref 0–41)
ANION GAP SERPL CALC-SCNC: 13 MMOL/L — SIGNIFICANT CHANGE UP (ref 7–14)
APTT BLD: 35 SEC — SIGNIFICANT CHANGE UP (ref 27–39.2)
AST SERPL-CCNC: 25 U/L — SIGNIFICANT CHANGE UP (ref 0–41)
BASOPHILS # BLD AUTO: 0.08 K/UL — SIGNIFICANT CHANGE UP (ref 0–0.2)
BASOPHILS NFR BLD AUTO: 0.9 % — SIGNIFICANT CHANGE UP (ref 0–1)
BILIRUB SERPL-MCNC: 1.2 MG/DL — SIGNIFICANT CHANGE UP (ref 0.2–1.2)
BUN SERPL-MCNC: 16 MG/DL — SIGNIFICANT CHANGE UP (ref 10–20)
CALCIUM SERPL-MCNC: 9.5 MG/DL — SIGNIFICANT CHANGE UP (ref 8.4–10.5)
CHLORIDE SERPL-SCNC: 103 MMOL/L — SIGNIFICANT CHANGE UP (ref 98–110)
CO2 SERPL-SCNC: 26 MMOL/L — SIGNIFICANT CHANGE UP (ref 17–32)
CREAT SERPL-MCNC: 1.2 MG/DL — SIGNIFICANT CHANGE UP (ref 0.7–1.5)
EGFR: 69 ML/MIN/1.73M2 — SIGNIFICANT CHANGE UP
EOSINOPHIL # BLD AUTO: 0.63 K/UL — SIGNIFICANT CHANGE UP (ref 0–0.7)
EOSINOPHIL NFR BLD AUTO: 6.8 % — SIGNIFICANT CHANGE UP (ref 0–8)
ESTIMATED AVERAGE GLUCOSE: 123 MG/DL — HIGH (ref 68–114)
GLUCOSE SERPL-MCNC: 95 MG/DL — SIGNIFICANT CHANGE UP (ref 70–99)
HCT VFR BLD CALC: 45 % — SIGNIFICANT CHANGE UP (ref 42–52)
HGB BLD-MCNC: 14.2 G/DL — SIGNIFICANT CHANGE UP (ref 14–18)
IMM GRANULOCYTES NFR BLD AUTO: 0.2 % — SIGNIFICANT CHANGE UP (ref 0.1–0.3)
INR BLD: 0.86 RATIO — SIGNIFICANT CHANGE UP (ref 0.65–1.3)
LYMPHOCYTES # BLD AUTO: 3.22 K/UL — SIGNIFICANT CHANGE UP (ref 1.2–3.4)
LYMPHOCYTES # BLD AUTO: 34.8 % — SIGNIFICANT CHANGE UP (ref 20.5–51.1)
MCHC RBC-ENTMCNC: 27.6 PG — SIGNIFICANT CHANGE UP (ref 27–31)
MCHC RBC-ENTMCNC: 31.6 G/DL — LOW (ref 32–37)
MCV RBC AUTO: 87.5 FL — SIGNIFICANT CHANGE UP (ref 80–94)
MONOCYTES # BLD AUTO: 0.9 K/UL — HIGH (ref 0.1–0.6)
MONOCYTES NFR BLD AUTO: 9.7 % — HIGH (ref 1.7–9.3)
NEUTROPHILS # BLD AUTO: 4.39 K/UL — SIGNIFICANT CHANGE UP (ref 1.4–6.5)
NEUTROPHILS NFR BLD AUTO: 47.6 % — SIGNIFICANT CHANGE UP (ref 42.2–75.2)
NRBC # BLD: 0 /100 WBCS — SIGNIFICANT CHANGE UP (ref 0–0)
PLATELET # BLD AUTO: 276 K/UL — SIGNIFICANT CHANGE UP (ref 130–400)
PMV BLD: 11.5 FL — HIGH (ref 7.4–10.4)
POTASSIUM SERPL-MCNC: 4.3 MMOL/L — SIGNIFICANT CHANGE UP (ref 3.5–5)
POTASSIUM SERPL-SCNC: 4.3 MMOL/L — SIGNIFICANT CHANGE UP (ref 3.5–5)
PROT SERPL-MCNC: 6.9 G/DL — SIGNIFICANT CHANGE UP (ref 6–8)
PROTHROM AB SERPL-ACNC: 9.7 SEC — LOW (ref 9.95–12.87)
RBC # BLD: 5.14 M/UL — SIGNIFICANT CHANGE UP (ref 4.7–6.1)
RBC # FLD: 13.5 % — SIGNIFICANT CHANGE UP (ref 11.5–14.5)
SODIUM SERPL-SCNC: 142 MMOL/L — SIGNIFICANT CHANGE UP (ref 135–146)
WBC # BLD: 9.24 K/UL — SIGNIFICANT CHANGE UP (ref 4.8–10.8)
WBC # FLD AUTO: 9.24 K/UL — SIGNIFICANT CHANGE UP (ref 4.8–10.8)

## 2023-09-11 PROCEDURE — 93010 ELECTROCARDIOGRAM REPORT: CPT

## 2023-09-11 PROCEDURE — 36415 COLL VENOUS BLD VENIPUNCTURE: CPT

## 2023-09-11 PROCEDURE — 83036 HEMOGLOBIN GLYCOSYLATED A1C: CPT

## 2023-09-11 PROCEDURE — 85730 THROMBOPLASTIN TIME PARTIAL: CPT

## 2023-09-11 PROCEDURE — 80053 COMPREHEN METABOLIC PANEL: CPT

## 2023-09-11 PROCEDURE — 85610 PROTHROMBIN TIME: CPT

## 2023-09-11 PROCEDURE — 99214 OFFICE O/P EST MOD 30 MIN: CPT | Mod: 25

## 2023-09-11 PROCEDURE — 85025 COMPLETE CBC W/AUTO DIFF WBC: CPT

## 2023-09-11 PROCEDURE — 93005 ELECTROCARDIOGRAM TRACING: CPT

## 2023-09-11 NOTE — H&P PST ADULT - HISTORY OF PRESENT ILLNESS
pt with hx gall stones and now w some sludge  now for planned prodecure     PATIENT CURRENTLY DENIES CHEST PAIN  SHORTNESS OF BREATH  PALPITATIONS,  DYSURIA, OR UPPER RESPIRATORY INFECTION IN PAST 2 WEEKS  EXERCISE  TOLERANCE  1-2 FLIGHT OF STAIRS  WITHOUT SHORTNESS OF BREATH  denies travel outside the [place] in the past 30 days  Patient denies any signs or symptoms of COVID 19 and denies contact with known positive individuals.  They have an appointment for repeat COVID testing pre-procedure and acknowledge its time and place.  They were instructed to quarantine pre-procedure, practice exposure control measures, continue to self-monitor and report any concerns to their proceduralist.  pt advised self quarantine till day of procedure  Anesthesia Alert  NO--Difficult Airway  NO--History of neck surgery or radiation  NO--Limited ROM of neck  NO--History of Malignant hyperthermia  NO--No personal or family history of Pseudocholinesterase deficiency.  NO--Prior Anesthesia Complication  NO--Latex Allergy  NO--Loose teeth  NO--History of Rheumatoid Arthritis  NO--Bleeding risk  NO--YOLANDA  NO--Other_____    PT DENIES ANY RASHES, ABRASION, OR OPEN WOUNDS OR CUTS    AS PER THE PT, THIS IS HIS/HER COMPLETE MEDICAL AND SURGICAL HX, INCLUDING MEDICATIONS PRESCRIBED AND OVER THE COUNTER    Patient verbalized understanding of instructions and was given the opportunity to ask questions and have them answered.    pt denies any suicidal ideation or thoughts, pt states not a threat to self or others    Calculus of gallbladder without cholecystitis without obstruction    Encounter for other preprocedural examination    No pertinent family history in first degree relatives    FH: CAD (coronary artery disease) (Grandparent)    Chronic obstructive pulmonary disease    Cardiac abnormality    GERD (gastroesophageal reflux disease)    High cholesterol    Chronic gastric ulcer without hemorrhage and without perforation    Ulcer of esophagus    Heart attack    CAD S/P percutaneous coronary angioplasty    DM (diabetes mellitus)    Hiatal hernia with GERD    Coronary artery disease involving other coronary artery bypass graft, angina presence unspecified    Cardiac arrhythmia    H/O right coronary artery stent placement    [id number]    Revised Cardiac Risk Index for Pre-Operative Risk from [email or web address]  on 9/11/2023    RESULT SUMMARY:  2 points  Class III Risk    10.1 %  30-day risk of death, MI, or cardiac arrest    From Ducmari 2017, based on pooled data from 5 high quality external validations (4 prospective). These numbers are higher than those often quoted from the now-outdated original study (Poncho 1999). See Evidence for details.      INPUTS:  Elevated-risk surgery —> 0 = No  History of ischemic heart disease —> 1 = Yes  History of congestive heart failure —> 1 = Yes  History of cerebrovascular disease —> 0 = No  Pre-operative treatment with insulin —> 0 = No  Pre-operative creatinine >2 mg/dL / 176.8 µmol/L —> 0 = No    Duke Activity Status Index (DASI) from [employer]  on 9/11/2023    RESULT SUMMARY:  58.2 points  The higher the score (maximum 58.2), the higher the functional status.    9.89 METs        INPUTS:  Take care of self —> 2.75 = Yes  Walk indoors —> 1.75 = Yes  Walk 1&ndash;2 blocks on level ground —> 2.75 = Yes  Climb a flight of stairs or walk up a hill —> 5.5 = Yes  Run a short distance —> 8 = Yes  Do light work around the house —> 2.7 = Yes  Do moderate work around the house —> 3.5 = Yes  Do heavy work around the house —> 8 = Yes  Do yardwork —> 4.5 = Yes  Have sexual relations —> 5.25 = Yes  Participate in moderate recreational activities —> 6 = Yes  Participate in strenuous sports —> 7.5 = Yes

## 2023-09-11 NOTE — H&P PST ADULT - REASON FOR ADMISSION
Patient is a 63  year old  male presenting to PAST in preparation for Laparoscopic Cholecystectomy possible open  on   9/15/23 under general anesthesia by Dr. phoenix

## 2023-09-12 DIAGNOSIS — Z01.818 ENCOUNTER FOR OTHER PREPROCEDURAL EXAMINATION: ICD-10-CM

## 2023-09-12 DIAGNOSIS — K80.20 CALCULUS OF GALLBLADDER WITHOUT CHOLECYSTITIS WITHOUT OBSTRUCTION: ICD-10-CM

## 2023-09-14 NOTE — ASU PATIENT PROFILE, ADULT - FALL HARM RISK - FACTORS NURSING JUDGEMENT
No
Continue Regimen: Absorica 40mg po bid w/ food ([facility], 0 refills)
Plan: can use Sb Chen's cortibalm tid on the lips and cortisone on the ears, can call if they need a rx of hydrocortisone
Detail Level: Zone
Render In Strict Bullet Format?: No
Plan: can use otc hydrocortisone cream prn

## 2023-09-15 ENCOUNTER — TRANSCRIPTION ENCOUNTER (OUTPATIENT)
Age: 63
End: 2023-09-15

## 2023-09-15 ENCOUNTER — OUTPATIENT (OUTPATIENT)
Dept: OUTPATIENT SERVICES | Facility: HOSPITAL | Age: 63
LOS: 1 days | Discharge: HOME | End: 2023-09-15
Payer: MEDICAID

## 2023-09-15 VITALS
SYSTOLIC BLOOD PRESSURE: 138 MMHG | HEART RATE: 55 BPM | DIASTOLIC BLOOD PRESSURE: 63 MMHG | OXYGEN SATURATION: 94 % | RESPIRATION RATE: 18 BRPM

## 2023-09-15 VITALS
DIASTOLIC BLOOD PRESSURE: 69 MMHG | HEIGHT: 67 IN | SYSTOLIC BLOOD PRESSURE: 142 MMHG | OXYGEN SATURATION: 96 % | HEART RATE: 65 BPM | WEIGHT: 184.97 LBS | TEMPERATURE: 98 F | RESPIRATION RATE: 18 BRPM

## 2023-09-15 DIAGNOSIS — I49.9 CARDIAC ARRHYTHMIA, UNSPECIFIED: Chronic | ICD-10-CM

## 2023-09-15 DIAGNOSIS — I25.810 ATHEROSCLEROSIS OF CORONARY ARTERY BYPASS GRAFT(S) WITHOUT ANGINA PECTORIS: Chronic | ICD-10-CM

## 2023-09-15 DIAGNOSIS — Z95.5 PRESENCE OF CORONARY ANGIOPLASTY IMPLANT AND GRAFT: Chronic | ICD-10-CM

## 2023-09-15 DIAGNOSIS — K80.20 CALCULUS OF GALLBLADDER WITHOUT CHOLECYSTITIS WITHOUT OBSTRUCTION: ICD-10-CM

## 2023-09-15 PROCEDURE — C1889: CPT

## 2023-09-15 PROCEDURE — 88304 TISSUE EXAM BY PATHOLOGIST: CPT

## 2023-09-15 PROCEDURE — C9399: CPT

## 2023-09-15 PROCEDURE — 88304 TISSUE EXAM BY PATHOLOGIST: CPT | Mod: 26

## 2023-09-15 RX ORDER — ACETAMINOPHEN 500 MG
1000 TABLET ORAL ONCE
Refills: 0 | Status: COMPLETED | OUTPATIENT
Start: 2023-09-15 | End: 2023-09-15

## 2023-09-15 RX ORDER — NIFEDIPINE 30 MG
1 TABLET, EXTENDED RELEASE 24 HR ORAL
Qty: 0 | Refills: 0 | DISCHARGE

## 2023-09-15 RX ORDER — LANSOPRAZOLE 15 MG/1
1 CAPSULE, DELAYED RELEASE ORAL
Qty: 0 | Refills: 0 | DISCHARGE

## 2023-09-15 RX ORDER — FLUTICASONE PROPIONATE AND SALMETEROL 50; 250 UG/1; UG/1
1 POWDER ORAL; RESPIRATORY (INHALATION)
Qty: 0 | Refills: 0 | DISCHARGE

## 2023-09-15 RX ORDER — METOPROLOL TARTRATE 50 MG
1 TABLET ORAL
Qty: 0 | Refills: 0 | DISCHARGE

## 2023-09-15 RX ORDER — VALSARTAN 80 MG/1
1 TABLET ORAL
Qty: 0 | Refills: 0 | DISCHARGE

## 2023-09-15 RX ORDER — ONDANSETRON 8 MG/1
4 TABLET, FILM COATED ORAL ONCE
Refills: 0 | Status: DISCONTINUED | OUTPATIENT
Start: 2023-09-15 | End: 2023-09-15

## 2023-09-15 RX ORDER — FLUTICASONE FUROATE AND VILANTEROL TRIFENATATE 100; 25 UG/1; UG/1
1 POWDER RESPIRATORY (INHALATION)
Qty: 0 | Refills: 0 | DISCHARGE

## 2023-09-15 RX ORDER — ALBUTEROL 90 UG/1
3 AEROSOL, METERED ORAL
Qty: 0 | Refills: 0 | DISCHARGE

## 2023-09-15 RX ORDER — HYDROCHLOROTHIAZIDE 25 MG
1 TABLET ORAL
Qty: 0 | Refills: 0 | DISCHARGE

## 2023-09-15 RX ORDER — ASPIRIN/CALCIUM CARB/MAGNESIUM 324 MG
1 TABLET ORAL
Qty: 0 | Refills: 0 | DISCHARGE

## 2023-09-15 RX ORDER — SODIUM CHLORIDE 9 MG/ML
1000 INJECTION, SOLUTION INTRAVENOUS
Refills: 0 | Status: DISCONTINUED | OUTPATIENT
Start: 2023-09-15 | End: 2023-09-15

## 2023-09-15 RX ORDER — ATORVASTATIN CALCIUM 80 MG/1
1 TABLET, FILM COATED ORAL
Qty: 0 | Refills: 0 | DISCHARGE

## 2023-09-15 RX ORDER — HYDROMORPHONE HYDROCHLORIDE 2 MG/ML
0.5 INJECTION INTRAMUSCULAR; INTRAVENOUS; SUBCUTANEOUS
Refills: 0 | Status: DISCONTINUED | OUTPATIENT
Start: 2023-09-15 | End: 2023-09-15

## 2023-09-15 RX ORDER — HYDROMORPHONE HYDROCHLORIDE 2 MG/ML
1 INJECTION INTRAMUSCULAR; INTRAVENOUS; SUBCUTANEOUS
Refills: 0 | Status: DISCONTINUED | OUTPATIENT
Start: 2023-09-15 | End: 2023-09-15

## 2023-09-15 RX ORDER — CLOPIDOGREL BISULFATE 75 MG/1
1 TABLET, FILM COATED ORAL
Qty: 0 | Refills: 0 | DISCHARGE

## 2023-09-15 RX ORDER — SITAGLIPTIN AND METFORMIN HYDROCHLORIDE 500; 50 MG/1; MG/1
1 TABLET, FILM COATED ORAL
Qty: 0 | Refills: 0 | DISCHARGE

## 2023-09-15 RX ORDER — OXYCODONE HYDROCHLORIDE 5 MG/1
1 TABLET ORAL
Qty: 8 | Refills: 0
Start: 2023-09-15 | End: 2023-09-16

## 2023-09-15 RX ORDER — LABETALOL HCL 100 MG
10 TABLET ORAL ONCE
Refills: 0 | Status: DISCONTINUED | OUTPATIENT
Start: 2023-09-15 | End: 2023-09-15

## 2023-09-15 RX ADMIN — SODIUM CHLORIDE 100 MILLILITER(S): 9 INJECTION, SOLUTION INTRAVENOUS at 12:46

## 2023-09-15 RX ADMIN — HYDROMORPHONE HYDROCHLORIDE 0.5 MILLIGRAM(S): 2 INJECTION INTRAMUSCULAR; INTRAVENOUS; SUBCUTANEOUS at 12:40

## 2023-09-15 RX ADMIN — Medication 400 MILLIGRAM(S): at 12:50

## 2023-09-15 RX ADMIN — HYDROMORPHONE HYDROCHLORIDE 0.5 MILLIGRAM(S): 2 INJECTION INTRAMUSCULAR; INTRAVENOUS; SUBCUTANEOUS at 12:57
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
## 2023-09-15 NOTE — ASU DISCHARGE PLAN (ADULT/PEDIATRIC) - NS MD DC FALL RISK RISK
For information on Fall & Injury Prevention, visit: [email or web address] OR  [email or web address] OR  https://www.cdc.gov/steadi/patient.html

## 2023-09-15 NOTE — ASU DISCHARGE PLAN (ADULT/PEDIATRIC) - CARE PROVIDER_API CALL
Yolanda [facility]  Surgery  [address] [place] [id number]  Phone: [phone]  Fax: [phone]  Follow Up Time:

## 2023-09-15 NOTE — CHART NOTE - NSCHARTNOTEFT_GEN_A_CORE
PACU ANESTHESIA ADMISSION NOTE      Procedure: lap ghazal    ____  Intubated  TV:______       Rate: ______      FiO2: ______    ___x_  Patent Airway    __x__  Full return of protective reflexes    __x__  Full recovery from anesthesia / back to baseline     Vitals:   T:     98.5      R:    25              BP:   117/66               Sat:        100           P: 105      Mental Status:  ___x_ Awake   _____ Alert   _____ Drowsy   _____ Sedated    Nausea/Vomiting:  __x__ NO  ______Yes,   See Post - Op Orders          Pain Scale (0-10):  _0____    Treatment: ____ None    ____ See Post - Op/PCA Orders    Post - Operative Fluids:   ____ Oral   ___x_ See Post - Op Orders    Plan: Discharge:   __x__Home       _____Floor     _____Critical Care    _____  Other:_________________    Comments:

## 2023-09-15 NOTE — ASU DISCHARGE PLAN (ADULT/PEDIATRIC) - ASU DC SPECIAL INSTRUCTIONSFT
Follow Up with Dr. Guerrero in 1-2 weeks. Please call office for confirmation of your appointment.    Pain: You can take over the counter medications such as Tylenol, and Ibuprofen for pain control. Please adhere to the instructions on the back of the bottle. If it was discussed that you would be receiving prescription pain medication upon discharge, this prescription will be sent to your pharmacy.    Antibiotics: None    If you develop fevers, chills, worsening pain, increased drainage from the wound, foul smelling drainage from the wound, nausea that won't subside, vomiting, or any other symptoms of concern, please call MD for further advice, evaluation, and/or treatment.    Activity: Ambulate and get out of bed as tolerated, and with assistance if feeling weak.

## 2023-09-19 LAB — SURGICAL PATHOLOGY STUDY: SIGNIFICANT CHANGE UP

## 2023-09-20 DIAGNOSIS — K80.10 CALCULUS OF GALLBLADDER WITH CHRONIC CHOLECYSTITIS WITHOUT OBSTRUCTION: ICD-10-CM

## 2023-09-20 DIAGNOSIS — K21.9 GASTRO-ESOPHAGEAL REFLUX DISEASE WITHOUT ESOPHAGITIS: ICD-10-CM

## 2023-09-20 DIAGNOSIS — Z95.5 PRESENCE OF CORONARY ANGIOPLASTY IMPLANT AND GRAFT: ICD-10-CM

## 2023-09-20 DIAGNOSIS — I25.10 ATHEROSCLEROTIC HEART DISEASE OF NATIVE CORONARY ARTERY WITHOUT ANGINA PECTORIS: ICD-10-CM

## 2023-09-20 DIAGNOSIS — E78.00 PURE HYPERCHOLESTEROLEMIA, UNSPECIFIED: ICD-10-CM

## 2023-09-20 DIAGNOSIS — K66.0 PERITONEAL ADHESIONS (POSTPROCEDURAL) (POSTINFECTION): ICD-10-CM

## 2023-09-20 DIAGNOSIS — I25.2 OLD MYOCARDIAL INFARCTION: ICD-10-CM

## 2023-09-20 DIAGNOSIS — Z79.82 LONG TERM (CURRENT) USE OF ASPIRIN: ICD-10-CM

## 2023-09-20 DIAGNOSIS — Z87.891 PERSONAL HISTORY OF NICOTINE DEPENDENCE: ICD-10-CM

## 2023-09-20 DIAGNOSIS — J44.9 CHRONIC OBSTRUCTIVE PULMONARY DISEASE, UNSPECIFIED: ICD-10-CM

## 2023-09-20 DIAGNOSIS — E11.9 TYPE 2 DIABETES MELLITUS WITHOUT COMPLICATIONS: ICD-10-CM

## 2023-09-23 NOTE — ASU PREOP CHECKLIST - ANTIBIOTIC
Spoke to  via phone. Intake information provided. Instructed to admit the patient. Admit orders received. SP3 routine orders RBTOx2. Patient and ED provider made aware of plan of care. Safety precautions maintained.   n/a

## 2023-11-17 ENCOUNTER — TRANSCRIPTION ENCOUNTER (OUTPATIENT)
Age: 63
End: 2023-11-17

## 2023-11-17 ENCOUNTER — OUTPATIENT (OUTPATIENT)
Dept: OUTPATIENT SERVICES | Facility: HOSPITAL | Age: 63
LOS: 1 days | Discharge: HOME | End: 2023-11-17
Payer: MEDICAID

## 2023-11-17 ENCOUNTER — RESULT REVIEW (OUTPATIENT)
Age: 63
End: 2023-11-17

## 2023-11-17 VITALS
HEART RATE: 68 BPM | DIASTOLIC BLOOD PRESSURE: 87 MMHG | OXYGEN SATURATION: 95 % | RESPIRATION RATE: 17 BRPM | SYSTOLIC BLOOD PRESSURE: 163 MMHG

## 2023-11-17 VITALS
SYSTOLIC BLOOD PRESSURE: 157 MMHG | DIASTOLIC BLOOD PRESSURE: 72 MMHG | WEIGHT: 188.05 LBS | TEMPERATURE: 97 F | HEART RATE: 57 BPM | RESPIRATION RATE: 18 BRPM | HEIGHT: 67 IN

## 2023-11-17 DIAGNOSIS — Z12.11 ENCOUNTER FOR SCREENING FOR MALIGNANT NEOPLASM OF COLON: ICD-10-CM

## 2023-11-17 DIAGNOSIS — Z95.5 PRESENCE OF CORONARY ANGIOPLASTY IMPLANT AND GRAFT: Chronic | ICD-10-CM

## 2023-11-17 DIAGNOSIS — I25.810 ATHEROSCLEROSIS OF CORONARY ARTERY BYPASS GRAFT(S) WITHOUT ANGINA PECTORIS: Chronic | ICD-10-CM

## 2023-11-17 DIAGNOSIS — I49.9 CARDIAC ARRHYTHMIA, UNSPECIFIED: Chronic | ICD-10-CM

## 2023-11-17 DIAGNOSIS — K59.00 CONSTIPATION, UNSPECIFIED: ICD-10-CM

## 2023-11-17 PROCEDURE — 88305 TISSUE EXAM BY PATHOLOGIST: CPT | Mod: 26

## 2023-11-17 PROCEDURE — 88305 TISSUE EXAM BY PATHOLOGIST: CPT

## 2023-11-17 RX ORDER — SACUBITRIL AND VALSARTAN 24; 26 MG/1; MG/1
1 TABLET, FILM COATED ORAL
Refills: 0 | DISCHARGE

## 2023-11-17 NOTE — ASU PATIENT PROFILE, ADULT - PRO MENTAL HEALTH SX RECENT
----- Message from Levi Licea sent at 6/6/2023  2:51 PM CDT -----  Contact: [facility] [employer]  .Type:  Needs Medical Advice    Who Called: [place] [facility] [employer] Remedios    Would the patient rather a call back or a response via [facility]?  Call back  Best Call Back Number: [phone] ext.[phone]  Additional Information: [place] [facility] [employer] is call in regarding to confirm the pt.  diagnosis and treatment plan.  Long Term Disability benefits  Fax # [phone]       
LVM for fernando with panc ca dx code and treatment plan  
none

## 2023-11-17 NOTE — CHART NOTE - NSCHARTNOTEFT_GEN_A_CORE
PACU ANESTHESIA ADMISSION NOTE      Procedure:   Post op diagnosis:      ____  Intubated  TV:______       Rate: ______      FiO2: ______    __x__  Patent Airway    __x__  Full return of protective reflexes    __x__  Full recovery from anesthesia / back to baseline     Vitals:   T:  97.8         R: 20                 BP: 108/59                 Sat: 97                  P: 58      Mental Status:  __x__ Awake   ___x__ Alert   _____ Drowsy   _____ Sedated    Nausea/Vomiting:  _x___ NO  ______Yes,   See Post - Op Orders          Pain Scale (0-10):  _____    Treatment: __x__ None    ____ See Post - Op/PCA Orders    Post - Operative Fluids:   ____ Oral   ___x_ See Post - Op Orders    Plan: Discharge:   __x__Home       _____Floor     _____Critical Care    _____  Other:_________________    Comments:    Uneventful anesthesia. Patient transported to  spontaneously breathing and hemodynamically stable.

## 2023-11-17 NOTE — ASU PATIENT PROFILE, ADULT - URINARY CATHETER
I, Gonzalez Ragsdale, performed the initial face to face bedside interview with this patient regarding history of present illness, review of symptoms and relevant past medical, social and family history.  I completed an independent physical examination.  I was the initial provider who evaluated this patient. I have signed out the follow up of any pending tests (i.e. labs, radiological studies) to the ACP.  I have communicated the patient’s plan of care and disposition with the ACP. no

## 2023-11-17 NOTE — PRE-ANESTHESIA EVALUATION ADULT - NSANTHOSAYNRD_GEN_A_CORE
denies/No. YOLANDA screening performed.  STOP BANG Legend: 0-2 = LOW Risk; 3-4 = INTERMEDIATE Risk; 5-8 = HIGH Risk

## 2023-11-17 NOTE — ASU PATIENT PROFILE, ADULT - FALL HARM RISK - UNIVERSAL INTERVENTIONS
Bed in lowest position, wheels locked, appropriate side rails in place/Call bell, personal items and telephone in reach/Instruct patient to call for assistance before getting out of bed or chair/Non-slip footwear when patient is out of bed/[place] to call system/Physically safe environment - no spills, clutter or unnecessary equipment/Purposeful Proactive Rounding/Room/bathroom lighting operational, light cord in reach

## 2023-11-20 LAB
SURGICAL PATHOLOGY STUDY: SIGNIFICANT CHANGE UP
SURGICAL PATHOLOGY STUDY: SIGNIFICANT CHANGE UP

## 2023-11-22 DIAGNOSIS — Z79.01 LONG TERM (CURRENT) USE OF ANTICOAGULANTS: ICD-10-CM

## 2023-11-22 DIAGNOSIS — E11.9 TYPE 2 DIABETES MELLITUS WITHOUT COMPLICATIONS: ICD-10-CM

## 2023-11-22 DIAGNOSIS — Z79.82 LONG TERM (CURRENT) USE OF ASPIRIN: ICD-10-CM

## 2023-11-22 DIAGNOSIS — Z95.1 PRESENCE OF AORTOCORONARY BYPASS GRAFT: ICD-10-CM

## 2023-11-22 DIAGNOSIS — K64.8 OTHER HEMORRHOIDS: ICD-10-CM

## 2023-11-22 DIAGNOSIS — K57.30 DIVERTICULOSIS OF LARGE INTESTINE WITHOUT PERFORATION OR ABSCESS WITHOUT BLEEDING: ICD-10-CM

## 2023-11-22 DIAGNOSIS — Z12.11 ENCOUNTER FOR SCREENING FOR MALIGNANT NEOPLASM OF COLON: ICD-10-CM

## 2023-11-22 DIAGNOSIS — K44.9 DIAPHRAGMATIC HERNIA WITHOUT OBSTRUCTION OR GANGRENE: ICD-10-CM

## 2023-11-22 DIAGNOSIS — K21.9 GASTRO-ESOPHAGEAL REFLUX DISEASE WITHOUT ESOPHAGITIS: ICD-10-CM

## 2023-11-22 DIAGNOSIS — I25.10 ATHEROSCLEROTIC HEART DISEASE OF NATIVE CORONARY ARTERY WITHOUT ANGINA PECTORIS: ICD-10-CM

## 2023-11-22 DIAGNOSIS — K64.4 RESIDUAL HEMORRHOIDAL SKIN TAGS: ICD-10-CM
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
## 2023-12-26 NOTE — DISCHARGE NOTE PROVIDER - HOSPITAL COURSE
bed rails
61 M with Hx of CAD s/p CABG in 2008 (LIMA to LAD), STEMI s/p PCI to LCx and LM, DM, HTN, HLD, GERD, hiatal hernia presents with 2 days of chest pain that "feels like gas." The pain is substernal, radiating to L arm and shoulder blade, neck, occurs at rest and not worsened by exertion. He can walk up to 4 miles with no significant limitation. He has not been compliant with his medications including aspirin and brilinta. He denies SOB, cough, palpitations fever.    ED VS: T(F): , Max: 96.5 (03-21-22 @ 07:22)  HR:  (57 - 86)  BP:  (152/74 - 218/104)  RR: 20  SpO2:  (97% - 100%)    Labs: trop 0.22 > 0.30, BNP 1288. EKG with no acute ischemic changes  Imaging: RUQ US Sludge within the gallbladder. Benign cyst within the right lobe of the liver.    Pt received ASA loading and Brilinta, started on heparin for NSTEMI. Received losartan and BP improved. Patient went for cath with Dr. Shore. Two vessel disease noted. Two stents placed, resolute [place] 3.5 x 18 stent to proximal LCx, 3.0 x 34 stent to OM1. Patient reported that he is chest pain free after the procedure. He feels well overall and is ready for discharge.

## 2024-01-22 NOTE — ASU PATIENT PROFILE, ADULT - WHEN WAS YOUR LAST VACCINATION? YEAR
[place] Norco  PDMP:  Verified.     Halima, please advise.           2021

## 2024-03-30 ENCOUNTER — INPATIENT (INPATIENT)
Facility: HOSPITAL | Age: 64
LOS: 0 days | Discharge: HOME | DRG: 133 | End: 2024-03-31
Attending: STUDENT IN AN ORGANIZED HEALTH CARE EDUCATION/TRAINING PROGRAM | Admitting: INTERNAL MEDICINE
Payer: MEDICAID

## 2024-03-30 VITALS
RESPIRATION RATE: 24 BRPM | DIASTOLIC BLOOD PRESSURE: 137 MMHG | WEIGHT: 179.9 LBS | OXYGEN SATURATION: 98 % | HEART RATE: 97 BPM | SYSTOLIC BLOOD PRESSURE: 259 MMHG

## 2024-03-30 DIAGNOSIS — J96.02 ACUTE RESPIRATORY FAILURE WITH HYPERCAPNIA: ICD-10-CM

## 2024-03-30 DIAGNOSIS — Z95.5 PRESENCE OF CORONARY ANGIOPLASTY IMPLANT AND GRAFT: Chronic | ICD-10-CM

## 2024-03-30 DIAGNOSIS — Z90.49 ACQUIRED ABSENCE OF OTHER SPECIFIED PARTS OF DIGESTIVE TRACT: Chronic | ICD-10-CM

## 2024-03-30 DIAGNOSIS — I49.9 CARDIAC ARRHYTHMIA, UNSPECIFIED: Chronic | ICD-10-CM

## 2024-03-30 DIAGNOSIS — I25.810 ATHEROSCLEROSIS OF CORONARY ARTERY BYPASS GRAFT(S) WITHOUT ANGINA PECTORIS: Chronic | ICD-10-CM

## 2024-03-30 DIAGNOSIS — J96.01 ACUTE RESPIRATORY FAILURE WITH HYPOXIA: ICD-10-CM

## 2024-03-30 LAB
ALBUMIN SERPL ELPH-MCNC: 4.2 G/DL — SIGNIFICANT CHANGE UP (ref 3.5–5.2)
ALP SERPL-CCNC: 116 U/L — HIGH (ref 30–115)
ALT FLD-CCNC: 37 U/L — SIGNIFICANT CHANGE UP (ref 0–41)
ANION GAP SERPL CALC-SCNC: 18 MMOL/L — HIGH (ref 7–14)
AST SERPL-CCNC: 29 U/L — SIGNIFICANT CHANGE UP (ref 0–41)
BASE EXCESS BLDV CALC-SCNC: -1.4 MMOL/L — SIGNIFICANT CHANGE UP (ref -2–3)
BASOPHILS # BLD AUTO: 0 K/UL — SIGNIFICANT CHANGE UP (ref 0–0.2)
BASOPHILS NFR BLD AUTO: 0 % — SIGNIFICANT CHANGE UP (ref 0–1)
BILIRUB SERPL-MCNC: 1.1 MG/DL — SIGNIFICANT CHANGE UP (ref 0.2–1.2)
BUN SERPL-MCNC: 26 MG/DL — HIGH (ref 10–20)
CA-I SERPL-SCNC: 1.19 MMOL/L — SIGNIFICANT CHANGE UP (ref 1.15–1.33)
CALCIUM SERPL-MCNC: 9.6 MG/DL — SIGNIFICANT CHANGE UP (ref 8.4–10.5)
CHLORIDE SERPL-SCNC: 103 MMOL/L — SIGNIFICANT CHANGE UP (ref 98–110)
CO2 SERPL-SCNC: 23 MMOL/L — SIGNIFICANT CHANGE UP (ref 17–32)
CREAT SERPL-MCNC: 1.3 MG/DL — SIGNIFICANT CHANGE UP (ref 0.7–1.5)
EGFR: 62 ML/MIN/1.73M2 — SIGNIFICANT CHANGE UP
EOSINOPHIL # BLD AUTO: 0.7 K/UL — SIGNIFICANT CHANGE UP (ref 0–0.7)
EOSINOPHIL NFR BLD AUTO: 5 % — SIGNIFICANT CHANGE UP (ref 0–8)
GAS PNL BLDV: 139 MMOL/L — SIGNIFICANT CHANGE UP (ref 136–145)
GAS PNL BLDV: SIGNIFICANT CHANGE UP
GLUCOSE SERPL-MCNC: 146 MG/DL — HIGH (ref 70–99)
HCO3 BLDV-SCNC: 28 MMOL/L — SIGNIFICANT CHANGE UP (ref 22–29)
HCT VFR BLD CALC: 49.9 % — SIGNIFICANT CHANGE UP (ref 42–52)
HCT VFR BLDA CALC: 48 % — SIGNIFICANT CHANGE UP (ref 39–51)
HGB BLD CALC-MCNC: 16.1 G/DL — SIGNIFICANT CHANGE UP (ref 12.6–17.4)
HGB BLD-MCNC: 15.7 G/DL — SIGNIFICANT CHANGE UP (ref 14–18)
LACTATE BLDV-MCNC: 4.5 MMOL/L — CRITICAL HIGH (ref 0.5–2)
LACTATE SERPL-SCNC: 1.9 MMOL/L — SIGNIFICANT CHANGE UP (ref 0.7–2)
LACTATE SERPL-SCNC: 4.3 MMOL/L — CRITICAL HIGH (ref 0.7–2)
LYMPHOCYTES # BLD AUTO: 26 % — SIGNIFICANT CHANGE UP (ref 20.5–51.1)
LYMPHOCYTES # BLD AUTO: 3.63 K/UL — HIGH (ref 1.2–3.4)
MANUAL SMEAR VERIFICATION: YES — SIGNIFICANT CHANGE UP
MCHC RBC-ENTMCNC: 27.1 PG — SIGNIFICANT CHANGE UP (ref 27–31)
MCHC RBC-ENTMCNC: 31.5 G/DL — LOW (ref 32–37)
MCV RBC AUTO: 86 FL — SIGNIFICANT CHANGE UP (ref 80–94)
MONOCYTES # BLD AUTO: 1.54 K/UL — HIGH (ref 0.1–0.6)
MONOCYTES NFR BLD AUTO: 11 % — HIGH (ref 1.7–9.3)
NEUTROPHILS # BLD AUTO: 7.55 K/UL — HIGH (ref 1.4–6.5)
NEUTROPHILS NFR BLD AUTO: 53 % — SIGNIFICANT CHANGE UP (ref 42.2–75.2)
NEUTS BAND # BLD: 1 % — SIGNIFICANT CHANGE UP (ref 0–6)
NRBC # BLD: 0 /100 WBCS — SIGNIFICANT CHANGE UP (ref 0–0)
NRBC # BLD: SIGNIFICANT CHANGE UP /100 WBCS (ref 0–0)
NT-PROBNP SERPL-SCNC: 615 PG/ML — HIGH (ref 0–300)
NT-PROBNP SERPL-SCNC: 751 PG/ML — HIGH (ref 0–300)
PCO2 BLDV: 65 MMHG — HIGH (ref 42–55)
PH BLDV: 7.24 — LOW (ref 7.32–7.43)
PLAT MORPH BLD: NORMAL — SIGNIFICANT CHANGE UP
PLATELET # BLD AUTO: 341 K/UL — SIGNIFICANT CHANGE UP (ref 130–400)
PMV BLD: 10.4 FL — SIGNIFICANT CHANGE UP (ref 7.4–10.4)
PO2 BLDV: 48 MMHG — HIGH (ref 25–45)
POTASSIUM BLDV-SCNC: 3.9 MMOL/L — SIGNIFICANT CHANGE UP (ref 3.5–5.1)
POTASSIUM SERPL-MCNC: 4.1 MMOL/L — SIGNIFICANT CHANGE UP (ref 3.5–5)
POTASSIUM SERPL-SCNC: 4.1 MMOL/L — SIGNIFICANT CHANGE UP (ref 3.5–5)
PROT SERPL-MCNC: 7.1 G/DL — SIGNIFICANT CHANGE UP (ref 6–8)
RBC # BLD: 5.8 M/UL — SIGNIFICANT CHANGE UP (ref 4.7–6.1)
RBC # FLD: 13.5 % — SIGNIFICANT CHANGE UP (ref 11.5–14.5)
RBC BLD AUTO: NORMAL — SIGNIFICANT CHANGE UP
SAO2 % BLDV: 72.9 % — SIGNIFICANT CHANGE UP (ref 67–88)
SODIUM SERPL-SCNC: 144 MMOL/L — SIGNIFICANT CHANGE UP (ref 135–146)
TROPONIN T, HIGH SENSITIVITY RESULT: 14 NG/L — SIGNIFICANT CHANGE UP (ref 6–21)
VARIANT LYMPHS # BLD: 4 % — SIGNIFICANT CHANGE UP (ref 0–5)
WBC # BLD: 13.98 K/UL — HIGH (ref 4.8–10.8)
WBC # FLD AUTO: 13.98 K/UL — HIGH (ref 4.8–10.8)

## 2024-03-30 PROCEDURE — 71045 X-RAY EXAM CHEST 1 VIEW: CPT | Mod: 26

## 2024-03-30 PROCEDURE — 83605 ASSAY OF LACTIC ACID: CPT

## 2024-03-30 PROCEDURE — 84100 ASSAY OF PHOSPHORUS: CPT

## 2024-03-30 PROCEDURE — 99291 CRITICAL CARE FIRST HOUR: CPT | Mod: 25

## 2024-03-30 PROCEDURE — 99222 1ST HOSP IP/OBS MODERATE 55: CPT

## 2024-03-30 PROCEDURE — 85027 COMPLETE CBC AUTOMATED: CPT

## 2024-03-30 PROCEDURE — 80048 BASIC METABOLIC PNL TOTAL CA: CPT

## 2024-03-30 PROCEDURE — 83735 ASSAY OF MAGNESIUM: CPT

## 2024-03-30 PROCEDURE — 36415 COLL VENOUS BLD VENIPUNCTURE: CPT

## 2024-03-30 PROCEDURE — 94640 AIRWAY INHALATION TREATMENT: CPT

## 2024-03-30 PROCEDURE — 83880 ASSAY OF NATRIURETIC PEPTIDE: CPT

## 2024-03-30 PROCEDURE — 87040 BLOOD CULTURE FOR BACTERIA: CPT

## 2024-03-30 PROCEDURE — 99221 1ST HOSP IP/OBS SF/LOW 40: CPT

## 2024-03-30 PROCEDURE — 93010 ELECTROCARDIOGRAM REPORT: CPT

## 2024-03-30 RX ORDER — TICAGRELOR 90 MG/1
90 TABLET ORAL ONCE
Refills: 0 | Status: COMPLETED | OUTPATIENT
Start: 2024-03-30 | End: 2024-03-30

## 2024-03-30 RX ORDER — IPRATROPIUM/ALBUTEROL SULFATE 18-103MCG
3 AEROSOL WITH ADAPTER (GRAM) INHALATION EVERY 6 HOURS
Refills: 0 | Status: DISCONTINUED | OUTPATIENT
Start: 2024-03-30 | End: 2024-03-31

## 2024-03-30 RX ORDER — FUROSEMIDE 40 MG
40 TABLET ORAL ONCE
Refills: 0 | Status: COMPLETED | OUTPATIENT
Start: 2024-03-30 | End: 2024-03-30

## 2024-03-30 RX ORDER — CHLORHEXIDINE GLUCONATE 213 G/1000ML
1 SOLUTION TOPICAL
Refills: 0 | Status: DISCONTINUED | OUTPATIENT
Start: 2024-03-30 | End: 2024-03-31

## 2024-03-30 RX ORDER — TICAGRELOR 90 MG/1
90 TABLET ORAL
Refills: 0 | Status: DISCONTINUED | OUTPATIENT
Start: 2024-03-30 | End: 2024-03-31

## 2024-03-30 RX ORDER — AZITHROMYCIN 500 MG/1
500 TABLET, FILM COATED ORAL
Refills: 0 | Status: DISCONTINUED | OUTPATIENT
Start: 2024-03-30 | End: 2024-03-31

## 2024-03-30 RX ORDER — ALBUTEROL 90 UG/1
2 AEROSOL, METERED ORAL EVERY 6 HOURS
Refills: 0 | Status: DISCONTINUED | OUTPATIENT
Start: 2024-03-30 | End: 2024-03-31

## 2024-03-30 RX ORDER — FUROSEMIDE 40 MG
40 TABLET ORAL DAILY
Refills: 0 | Status: DISCONTINUED | OUTPATIENT
Start: 2024-03-30 | End: 2024-03-31

## 2024-03-30 RX ORDER — FUROSEMIDE 40 MG
40 TABLET ORAL DAILY
Refills: 0 | Status: DISCONTINUED | OUTPATIENT
Start: 2024-03-30 | End: 2024-03-30

## 2024-03-30 RX ORDER — METOPROLOL TARTRATE 50 MG
25 TABLET ORAL DAILY
Refills: 0 | Status: DISCONTINUED | OUTPATIENT
Start: 2024-03-30 | End: 2024-03-31

## 2024-03-30 RX ORDER — ASPIRIN/CALCIUM CARB/MAGNESIUM 324 MG
81 TABLET ORAL DAILY
Refills: 0 | Status: DISCONTINUED | OUTPATIENT
Start: 2024-03-30 | End: 2024-03-31

## 2024-03-30 RX ORDER — ATORVASTATIN CALCIUM 80 MG/1
80 TABLET, FILM COATED ORAL AT BEDTIME
Refills: 0 | Status: DISCONTINUED | OUTPATIENT
Start: 2024-03-30 | End: 2024-03-31

## 2024-03-30 RX ORDER — IPRATROPIUM/ALBUTEROL SULFATE 18-103MCG
3 AEROSOL WITH ADAPTER (GRAM) INHALATION
Refills: 0 | Status: COMPLETED | OUTPATIENT
Start: 2024-03-30 | End: 2024-03-30

## 2024-03-30 RX ADMIN — Medication 3 MILLILITER(S): at 13:44

## 2024-03-30 RX ADMIN — TICAGRELOR 90 MILLIGRAM(S): 90 TABLET ORAL at 17:58

## 2024-03-30 RX ADMIN — Medication 40 MILLIGRAM(S): at 05:22

## 2024-03-30 RX ADMIN — Medication 40 MILLIGRAM(S): at 21:33

## 2024-03-30 RX ADMIN — AZITHROMYCIN 255 MILLIGRAM(S): 500 TABLET, FILM COATED ORAL at 10:17

## 2024-03-30 RX ADMIN — Medication 3 MILLILITER(S): at 05:20

## 2024-03-30 RX ADMIN — Medication 125 MILLIGRAM(S): at 05:18

## 2024-03-30 RX ADMIN — Medication 3 MILLILITER(S): at 07:32

## 2024-03-30 RX ADMIN — Medication 3 MILLILITER(S): at 19:45

## 2024-03-30 RX ADMIN — Medication 40 MILLIGRAM(S): at 13:56

## 2024-03-30 RX ADMIN — TICAGRELOR 90 MILLIGRAM(S): 90 TABLET ORAL at 21:53

## 2024-03-30 RX ADMIN — ATORVASTATIN CALCIUM 80 MILLIGRAM(S): 80 TABLET, FILM COATED ORAL at 21:33

## 2024-03-30 RX ADMIN — Medication 81 MILLIGRAM(S): at 11:55

## 2024-03-30 RX ADMIN — Medication 30 MILLIGRAM(S): at 13:56

## 2024-03-30 NOTE — ED PROVIDER NOTE - OBJECTIVE STATEMENT
63-year-old male past medical history CHF, DM, CAD, HLD, HTN presenting to ED for evaluation of sudden onset of shortness of breath over the past 30 minutes.  Denies fever, chills, chest pain, calf pain or swelling.

## 2024-03-30 NOTE — CONSULT NOTE ADULT - PROBLEM SELECTOR RECOMMENDATION 9
- CXR reviewed showing no evidence of consolidation nor infiltrate  - Continue BiPAP and titrate off to NC as tolerated  - Solumedrol 40mg q12 daily and titrate to oral steroids as tolerated for a total of 5 days  - Azithromycin 500mg for 3 days  - Duonebs q6 hours daily, Transition to Symbicort inhaler upon hospital discharge as pt has a known diagnosis of asthma  - Albuterol PRN  - Continue Lasix regimen as per primary team  - OOB to chair as tolerated  - Daily incentive spirometer use  - Echocardiogram, follow up results  - Monitor electrolytes and replete as indicated  - Follow up in Pulmonary clinic upon discharge within 1 week - CXR reviewed showing no evidence of consolidation nor infiltrate  - Transitioned to NC during assessment  - Maintain Spo@ greater than or equal to 95%  - Continue BiPAP nightly  - Solumedrol 40mg q12 daily and titrate to oral steroids as tolerated for a total of 5 days  - Azithromycin 500mg for 3 days  - Duonebs q6 hours daily, Transition to Symbicort inhaler upon hospital discharge as pt has a known diagnosis of asthma  - Albuterol PRN  - Continue Lasix regimen as per primary team  - OOB to chair as tolerated  - Daily incentive spirometer use  - Echocardiogram, follow up results  - Monitor electrolytes and replete as indicated  - Follow up in Pulmonary clinic upon discharge within 1 week  - Can start trial of PPI as pt is complaining of severe reflux

## 2024-03-30 NOTE — ED PROVIDER NOTE - CLINICAL SUMMARY MEDICAL DECISION MAKING FREE TEXT BOX
63-year-old, history of MI, CABG, cardiac stent, CHF, HTN, DM, ?asthma, presents to the ED with sudden onset severe shortness of breath.  No chest pain.  Exam shows anxious patient in severe distress, bilateral wheezing, RR S1S2, no edema.  Started on BiPAP, given nebs, Solu-Medrol and Lasix with symptomatic improvement.  EKG normal sinus rhythm no acute changes.  Chest x-ray with mild CHF.  Labs noted for WBC 13.9, BUN 26, creatinine 1.3, troponin 14, lactate 4.5.  Discussed with intensivist.  Admit to telemetry.

## 2024-03-30 NOTE — CONSULT NOTE ADULT - ASSESSMENT
62 y/o M with a pmhx of DM, HTN, HLD, CHF, asthma, CAD (with PCI x 3) who presents for evaluation of a sudden onset of shortness of breath and chest tightness while sleeping      Impression:  #SOB: ?Asthma exacerbation vs. ?Acute on chronic HFrEF  #CAD s/p PCI x3  #HFrEF (32% in 2022)  #HTN, HLD, DM      Currently denies any CP, SOB, palpitations  Trop flat x1  ECG, NS, non ischemic  Cxr pending read  ECHO from 3/2022: EF 32%, Multiple WMA's,   pBNP 615  Clinically appears euvolemic   Labs significant for: WBC 13.9 and lactate 4.3   Pt w/ Hx on meds noncompliance       Plan:  Obtain TTE  Check lipid profile, HgA1C  Can hold Brilinta  Cont w/ cardiac home meds ASA, Lipitor, Metoprolol  Further Meds adjustment when LV function is known   Monitor lytes    64 y/o M with a pmhx of DM, HTN, HLD, CHF, asthma, CAD (with PCI x 3) who presents for evaluation of a sudden onset of shortness of breath and chest tightness while sleeping      Impression:  #SOB: ?Asthma exacerbation vs. ?Acute on chronic HFrEF  #CAD s/p PCI x3  #HFrEF (32% in 2022)  #HTN, HLD, DM      Currently denies any CP, SOB, palpitations  Trop flat x1  ECG, NS, non ischemic  Cxr pending read  ECHO from 3/2022: EF 32%, Multiple WMA's,   pBNP 615  Clinically appears euvolemic   Labs significant for: WBC 13.9 and lactate 4.3   Pt w/ Hx on meds noncompliance       Plan:  Obtain TTE  Check lipid profile, HgA1C  Can hold Brilinta  Cont w/ cardiac home meds ASA, Lipitor, Metoprolol  Further Meds adjustment when LV function is known on repeat TTE   Monitor suni    62 y/o M with a pmhx of DM, HTN, HLD, CHF, asthma, CAD (with PCI x 3) who presents for evaluation of a sudden onset of shortness of breath and chest tightness while sleeping      Impression:  #SOB: ?Asthma exacerbation vs. ?Acute on chronic HFrEF  #CAD s/p PCI x3  #HFrEF (32% in 2022)  #HTN, HLD, DM      Currently denies any CP, SOB, palpitations  Trop flat x1  ECG, NS, non ischemic  Cxr pending read  ECHO from 3/2022: EF 32%, Multiple WMA's,   pBNP 615  Clinically appears euvolemic   Labs significant for: WBC 13.9 and lactate 4.3   Pt w/ Hx on meds noncompliance       Plan:  Obtain TTE  Check lipid profile, HgA1C  Cont w/ cardiac home meds ASA, Brilinta, Lipitor, Metoprolol  Further Meds adjustment when LV function is known on repeat TTE   Monitor suni

## 2024-03-30 NOTE — ED ADULT NURSE NOTE - TEMPLATE LIST FOR HEAD TO TOE ASSESSMENT
Medication sent to preferred pharmacy.   
Patient calls to report that you cannot get estrodiol cream OTC and she would like a prescription sent for this estrogen cream that was discussed in the office. Please advise on medication and instructions on how to use. Thank you.     She does not need a call back and her preferred pharmacy is [facility].       
Respiratory

## 2024-03-30 NOTE — H&P ADULT - NSICDXPASTSURGICALHX_GEN_ALL_CORE_FT
PAST SURGICAL HISTORY:  Cardiac arrhythmia bypass surgery 9 years ago    Coronary artery disease involving other coronary artery bypass graft, angina presence unspecified     H/O right coronary artery stent placement August 17, 2020    S/P cholecystectomy

## 2024-03-30 NOTE — H&P ADULT - NS ATTEND AMEND GEN_ALL_CORE FT
seen while in the ER, seems comfortable on BIPAP. Agree with above, cause of elevated lactic acid is uncertain especially as VS are good and does not appear to have an infection., Expect improvement to coincide with patient's clinical improvement. Will repeat seen while in the ER, seems comfortable on BIPAP. Agree with above, cause of elevated lactic acid is uncertain especially as VS are good and does not appear to have an infection., Expect improvement to coincide with patient's clinical improvement. Will repeat in a few hours along with blood culture (had leukocytosis) seen while in the ER, seems comfortable on BIPAP. CXR is (to me) unremarkable  Agree with above, cause of elevated lactic acid is uncertain especially as VS are good and does not appear to have an infection., Expect improvement to coincide with patient's clinical improvement. Will repeat in a few hours along with blood culture (had leukocytosis). Will also consider small fluid bolus

## 2024-03-30 NOTE — H&P ADULT - NSHPSOCIALHISTORY_GEN_ALL_CORE
Substance Use (street drugs): ( x ) never used  (  ) other:  Tobacco Usage:  ( x  ) never smoked   (   ) former smoker   (   ) current smoker  (     ) pack year  Alcohol Usage: None Substance Use (street drugs): ( x ) never used  Tobacco Usage:  ( X  ) former smoker  Alcohol Usage: None

## 2024-03-30 NOTE — ED PROVIDER NOTE - PHYSICAL EXAMINATION
GENERAL: Well-nourished, Well-developed. NAD.  HEAD: No visible or palpable bumps or hematomas. No ecchymosis behind ears B/L.  Eyes: PERRLA, EOMI.   ENMT: MMM.   CVS: No reproducible chest wall tenderness. Normal S1,S2. No murmurs appreciated on auscultation   RESP: (+)tachypnea. diffuse expiratory wheeze  Skin: Warm, Dry. No rashes or lesions. Good cap refill < 2 sec B/L.  EXT: Radial and pedal pulses present B/L. No calf tenderness or swelling B/L. No palpable cords. No pedal edema B/L.

## 2024-03-30 NOTE — PROGRESS NOTE ADULT - SUBJECTIVE AND OBJECTIVE BOX
KATH MCCOLLUM 63y Male  MRN#: [id number]       SUBJECTIVE  Patient is a 63y old Male who presents with a chief complaint of Currently admitted to medicine with the primary diagnosis of Acute respiratory failure with hypercapnia  s/p ICU downgrade feeling better, denies SOB, however gets SOB when wlks to the bathroom   no other overnight events     OBJECTIVE  PAST MEDICAL & SURGICAL HISTORY  GERD (gastroesophageal reflux disease)    High cholesterol    Chronic gastric ulcer without hemorrhage and without perforation    Ulcer of esophagus    CAD S/P percutaneous coronary angioplasty    DM (diabetes mellitus)    Hiatal hernia with GERD    Coronary artery disease involving other coronary artery bypass graft, angina presence unspecified    Cardiac arrhythmia  bypass surgery 9 years ago    H/O right coronary artery stent placement  August 17, 2020    S/P cholecystectomy      ALLERGIES:  No Known Allergies    MEDICATIONS:  STANDING MEDICATIONS  albuterol    90 MICROgram(s) HFA Inhaler 2 Puff(s) Inhalation every 6 hours  albuterol/ipratropium for Nebulization 3 milliLiter(s) Nebulizer every 6 hours  aspirin enteric coated 81 milliGRAM(s) Oral daily  atorvastatin 80 milliGRAM(s) Oral at bedtime  azithromycin  IVPB 500 milliGRAM(s) IV Intermittent <User Schedule>  chlorhexidine 2% Cloths 1 Application(s) Topical <User Schedule>  furosemide   Injectable 40 milliGRAM(s) IV Push daily  methylPREDNISolone sodium succinate Injectable 40 milliGRAM(s) IV Push every 8 hours  metoprolol tartrate 25 milliGRAM(s) Oral daily  PARoxetine 30 milliGRAM(s) Oral daily  ticagrelor 90 milliGRAM(s) Oral two times a day    PRN MEDICATIONS      VITAL SIGNS: Last 24 Hours  T(C): 35.2 (30 Mar 2024 06:52), Max: 35.2 (30 Mar 2024 06:52)  T(F): 95.3 (30 Mar 2024 06:52), Max: 95.3 (30 Mar 2024 06:52)  HR: 76 (30 Mar 2024 06:52) (76 - 97)  BP: 143/82 (30 Mar 2024 06:52) (126/78 - 259/137)  BP(mean): --  RR: 18 (30 Mar 2024 06:52) (18 - 24)  SpO2: 100% (30 Mar 2024 06:52) (98% - 100%)    LABS:                        15.7   13.98 )-----------( 341      ( 30 Mar 2024 04:48 )             49.9     03-30    144  |  103  |  26<H>  ----------------------------<  146<H>  4.1   |  23  |  1.3    Ca    9.6      30 Mar 2024 04:47    TPro  7.1  /  Alb  4.2  /  TBili  1.1  /  DBili  x   /  AST  29  /  ALT  37  /  AlkPhos  116<H>  03-30      Urinalysis Basic - ( 30 Mar 2024 04:47 )    Color: x / Appearance: x / SG: x / pH: x  Gluc: 146 mg/dL / Ketone: x  / Bili: x / Urobili: x   Blood: x / Protein: x / Nitrite: x   Leuk Esterase: x / RBC: x / WBC x   Sq Epi: x / Non Sq Epi: x / Bacteria: x        Lactate, Blood: 1.9 mmol/L (03-30-24 @ 11:33)  Lactate, Blood: 4.3 mmol/L *HH* (03-30-24 @ 04:47)          RADIOLOGY:      PHYSICAL EXAM:    GENERAL: not in distress  HEENT:  No JVD  PULMONARY: Clear to auscultation bilaterally  CARDIOVASCULAR: Regular rate and rhythm;  GASTROINTESTINAL: Soft, Nontender, Nondistended;  MUSCULOSKELETAL:  No clubbing, cyanosis, or edema  NEUROLOGY: AAOx3  SKIN: No rashes or lesions

## 2024-03-30 NOTE — ED ADULT NURSE NOTE - CHIEF COMPLAINT
EMERGENCY DEPARTMENT ENCOUNTER      CHIEF COMPLAINT    Chief Complaint   Patient presents with   • Dysuria       HPI    Lillie Lorenz is a 36 year old female with no significant PMH, who presents to the ED with complaint of dysuria and foul smelling urine that started 2 hours ago. She has had a UTI before and states this feels similar. She denies any fevers, chills, nausea, vomiting, diarrhea, abdominal pain, abnormal vaginal bleeding or discharge. LNMP was on 8/1/2020.     ALLERGIES    ALLERGIES:   Allergen Reactions   • Ciprofloxacin Muscle Spasm       CURRENT MEDICATIONS    No current facility-administered medications for this encounter.      Current Outpatient Medications   Medication Sig Dispense Refill   • cephalexin (Keflex) 500 MG capsule Take 1 capsule by mouth 2 times daily for 7 days. 14 capsule 0   • gabapentin (NEURONTIN) 300 MG capsule Take 1 capsule by mouth three times a day 90 capsule 0   • busPIRone (BUSPAR) 7.5 MG tablet Take 1 tablet by mouth 3 times daily as needed for anxiety. 90 tablet 0   • lisdexamfetamine (VYVANSE) 70 MG capsule Take 1 capsule by mouth daily (with breakfast). 30 capsule 0   • QUEtiapine (SEROQUEL) 100 MG tablet Take 1 tablet by mouth at bedtime. 30 tablet 0   • OLANZapine (ZYPREXA) 5 MG tablet Take 1 tablet by mouth at bedtime. 30 tablet 0   • prazosin (MINIPRESS) 2 MG capsule Take 3 capsules by mouth at bedtime for nightmares. 90 capsule 0   • OLANZapine (ZYPREXA ZYDIS) 5 MG disintegrating tablet Take 1 tablet by mouth 2 times daily. 30 tablet 1   • prazosin (MINIPRESS) 2 MG capsule Take 3 capsules by mouth at bedtime. 45 capsule 1   • nicotine polacrilex (NICORETTE) 2 MG gum Chew 1 piece by mouth every hour as needed for Smoking cessation. 100 each 0   • traZODone (DESYREL) 50 MG tablet Take 1 tablet by mouth nightly. Indications: Trouble Sleeping, Major Depressive Disorder 30 tablet 0   • lisdexamfetamine (VYVANSE) 60 MG capsule Take 1 capsule by mouth every morning.  Indications: Attention Deficit Hyperactivity Disorder 30 capsule 0   • gabapentin (NEURONTIN) 300 MG capsule Take 1 capsule by mouth 3 times daily. 90 capsule 2   • ferrous sulfate 325 (65 FE) MG tablet Take 1 tablet by mouth daily (with breakfast). 30 tablet 2   • pantoprazole (PROTONIX) 40 MG tablet Take 1 tablet by mouth nightly. 30 tablet 2   • ibuprofen (MOTRIN) 800 MG tablet Take 800 mg by mouth every 6 hours as needed for Pain.         PAST MEDICAL HISTORY    Past Medical History:   Diagnosis Date   • Anemia    • Arthritis    • Attention deficit disorder without mention of hyperactivity    • Back pain DDD   • Bipolar 1 disorder (CMS/HCC)    • DDD (degenerative disc disease), lumbar    • Depression    • Essential (primary) hypertension    • Fracture     left ankle @ 5yrs   • Gastroesophageal reflux disease    • Other chronic pain    • Otitis media    • Scoliosis 2011   • Substance abuse (CMS/[facility])        SURGICAL HISTORY    Past Surgical History:   Procedure Laterality Date   •  section, classic         SOCIAL HISTORY    Social History     Tobacco Use   • Smoking status: Former Smoker     Packs/day: 0.25     Types: Cigarettes     Quit date: 2019     Years since quittin.0   • Smokeless tobacco: Current User   Substance Use Topics   • Alcohol use: No     Alcohol/week: 0.0 standard drinks     Frequency: Never     Drinks per session: 1 or 2     Binge frequency: Never   • Drug use: No       FAMILY HISTORY    Family History   Problem Relation Age of Onset   • Heart disease Mother    • Kidney disease Mother    • Hypertension Father    • Asthma Sister    • Stroke Sister    • Substance abuse Sister        REVIEW OF SYSTEMS      Constitutional:  Denies fever, chills, change in appetite or activity    Eyes:  Denies vision changes, blurred vision or double vision   HENT:  Denies nasal congestion, rhinorrhea or sore throat.   Respiratory:  Denies cough, shortness of breath or wheezing.   Cardiovascular:   Denies chest pain or edema.  GI:  Denies abdominal pain, nausea, vomiting or diarrhea.   :  See HPI  Musculoskeletal:  Denies back pain or joint pain.   Integument:  Denies rash.   Lymph: Denies swollen lymph nodes.  Neurologic:  Denies headache, focal weakness or sensory changes.   Psychiatric:  Denies depression or anxiety, thoughts of SI       PHYSICAL EXAM    Vitals:    08/19/20 2335   BP: (!) 154/80   Pulse: 85   Resp: 16   Temp: 98.7 °F (37.1 °C)   TempSrc: Oral   SpO2: 100%   Weight: 124 kg   Height: 5' 2\" (1.575 m)       *Vital signs and nursing notes personally reviewed  Constitutional:  Well developed, well nourished. No acute distress, non-toxic appearance.    Eyes:  PERRL, conjunctivae normal.   HENT:  Head is atraumatic. External ears without lesions. Nose midline. Oropharynx moist.   Neck: Normal range of motion. No tenderness. Supple. No palpable lymph nodes   Respiratory:  No respiratory distress, normal breath sounds. No crackles. No wheezing.   Cardiovascular:  Normal rate, normal rhythm. No murmurs, gallops, or rubs. No pitting edema   GI:  Soft, nondistended. Normal bowel sounds, nontender. No appreciable hepatomegaly, splenomegaly, mass, rebound or guarding.   :  No costovertebral angle tenderness.   Musculoskeletal:  On inspection, there is no obvious edema, bruising, or deformities.   Integument:  Well hydrated, no rash.   Lymph: No appreciable lymphadenopathy.  Neurologic:  Alert & oriented x 3. GCS 15. Normal motor function. Normal sensory function. No focal deficits noted.   Psychiatric:  Speech and behavior appropriate.     RADIOLOGY    No orders to display         LABS    Results for orders placed or performed during the hospital encounter of 08/19/20   URINALYSIS, MACROSCOPIC -POINT OF CARE   Result Value    COLOR - POINT OF CARE Yellow    APPEARANCE, URINALYSIS - POINT OF CARE Hazy    GLUCOSE, URINALYSIS - POINT OF CARE Negative    BILIRUBIN, URINALYSIS - POINT OF CARE Negative     KETONES, URINALYSIS - POINT OF CARE Negative    SPECIFIC GRAVITY, URINALYSIS - POINT OF CARE 1.010    OCCULT BLOOD, URINALYSIS - POINT OF CARE Small (A)    PH, URINALYSIS - POINT OF CARE 7.0    PROTEIN, URINALYSIS - POINT OF CARE 30  (A)    UROBILINOGEN, URINALYSIS - POINT OF CARE 0.2    NITRITE, URINALYSIS - POINT OF CARE Negative    WBC ESTERASE, URINALYSIS - POINT OF CARE Large (A)   HCG POC   Result Value    HCG, URINE - POINT OF CARE Negative         ED MEDICATIONS      ED Medication Orders (From admission, onward)    None        ED COURSE & MEDICAL DECISION MAKING      35 yo female, no significant PMH, presents for dysuria and foul smelling urine x 2 hours. On exam, patient is in NAD, VSS. Cardiac and lung exam benign. Abdominal exam benign. Will order POC preg and POC UA.     POC UA shows large amount of leukocyte esterase and small occult blood. POC pregnancy test is negative. Will order formal UA.     I updated the patient on her test results. Will treat for UTI.    Patient's vital signs are stable and Lillie Lorenz is safe for discharge. Recommended patient follow up with their PCP if symptoms do not improve. Return precautions given. Patient understands and agrees with the plan. All questions and concerns addressed.    Impression:  The encounter diagnosis was Urinary tract infection with hematuria, site unspecified.    Follow Up:  [facility]  [address] [place] [id number]  [phone]  Go to   As needed, If symptoms worsen       New Prescriptions    CEPHALEXIN (KEFLEX) 500 MG CAPSULE    Take 1 capsule by mouth 2 times daily for 7 days.       Disposition:  Discharge 8/20/2020 12:01 AM  Lillie Lorenz discharge to home/self care.        Pt is discharged in good condition.      Tabitha SANTOS PA-C, working under the clinical supervision of attending physician, Dr. Mayra Croft.    Disclosure: This patient is being seen during a Public Health Emergency. The [place] [employer]  [employer] and Human Services and Angel Aguilar, Governor of the [facility], have declared a Public Health Emergency due to the spread of a novel coronavirus and disease COVID-19. This has led to significant resource scarcity and potential delays in care.       Tabitha Richard PA-C  08/20/20 0004     The patient is a 63y Male complaining of shortness of breath.

## 2024-03-30 NOTE — ED PROVIDER NOTE - CRITICAL CARE ATTENDING CONTRIBUTION TO CARE
63-year-old male, history of MI, CABG, cardiac stent, CHF, HTN, DM, ?asthma, presents with sudden onset of shortness of breath tonight.  No chest pain.  Exam shows alert patient in severe distress, HEENT NCAT PERRL, neck supple, lungs bilateral wheezing, RR S1S2, abdomen soft NT +BS, no CCE, neuro A&OX3 GCS 15 no deficits.

## 2024-03-30 NOTE — CONSULT NOTE ADULT - NS ATTEND AMEND GEN_ALL_CORE FT
64 y/o M with a pmhx of DM, HTN, HLD, CHF, asthma, CAD s/p CABG  (with PCI x 3)   SOB: hypercapneic rep failure with improvement after steroids and nebs.  HFREF : Clinically euvolemic. Restart home medications. CXR with mild congestion.   CAD: stable: No evidence of ACS. Continue DAPT.  No further cardiac intervention at the current time.

## 2024-03-30 NOTE — ED ADULT NURSE NOTE - AS PAIN REST
Patient stable for discharge with outpatient follow up. Total time spent discharge planning 41 minutes. 0 (no pain/absence of nonverbal indicators of pain) Discussed with patient, [facility] ACP.

## 2024-03-30 NOTE — H&P ADULT - NSHPLABSRESULTS_GEN_ALL_CORE
LABS:                        15.7   13.98 )-----------( 341      ( 30 Mar 2024 04:48 )             49.9     03-30    144  |  103  |  26<H>  ----------------------------<  146<H>  4.1   |  23  |  1.3    Ca    9.6      30 Mar 2024 04:47    TPro  7.1  /  Alb  4.2  /  TBili  1.1  /  DBili  x   /  AST  29  /  ALT  37  /  AlkPhos  116<H>  03-30    LIVER FUNCTIONS - ( 30 Mar 2024 04:47 )  Alb: 4.2 g/dL / Pro: 7.1 g/dL / ALK PHOS: 116 U/L / ALT: 37 U/L / AST: 29 U/L / GGT: x             Urinalysis Basic - ( 30 Mar 2024 04:47 )    Color: x / Appearance: x / SG: x / pH: x  Gluc: 146 mg/dL / Ketone: x  / Bili: x / Urobili: x   Blood: x / Protein: x / Nitrite: x   Leuk Esterase: x / RBC: x / WBC x   Sq Epi: x / Non Sq Epi: x / Bacteria: x

## 2024-03-30 NOTE — H&P ADULT - HISTORY OF PRESENT ILLNESS
Patient is a 64 y/o M with a pmhx of DM, HTN, HLD, CHF, asthma, CAD (with PCI x 3) who presents for evaluation of a sudden onset of shortness of breath and chest tightness while sleeping. 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 Symptoms lasted for 2 days and then slightly improved with the use of albuterol inhaler. However earlier today, symptoms reoccurred and progressively worsened into the night prompting evaluation to the ER. Patient also reports a non productive cough since onset of symptoms. Denies fever, chills, n/v/d, urinary or bowel complaints, chest pain, leg pain/swelling, recent travel. States hasn't had a asthma attack in several years. Doesn't have a pulmonologist States he follows up with cardiologist Dr. Vega Mcmahon and is scheduled for an Echo.

## 2024-03-30 NOTE — H&P ADULT - ASSESSMENT
Assessment:      Plan:      # Acute hypoxic respiratory failure secondary to CHF exacerbation vs. COPD  Admit to inpatient level of care-telemetry  Continue with duo nebs ATC  Will start solumedrol 40mg q8hrs   Monitor pulse ox   Assessment:  Patient is a 64 y/o M with a pmhx of DM, HTN, HLD, CHF, asthma, CAD (with PCI x 3) who presents for evaluation of a sudden onset of shortness of breath and chest tightness while sleeping.     Plan:    # Acute hypoxic respiratory failure secondary to asthma vs. CHF exacerbation  Admit to inpatient level of care-telemetry  Cardiac monitoring  Keep NPO while on BIPAP  Continue with duo nebs ATC  Will start solumedrol 40mg q8hrs   Monitor pulse ox  TTE  Monitor daily weights.  Strict Is and Os  Cardiology consult  Troponin x 1 negative.  Continue with Lasix 40mg daily IVP  Fluid restriction      #HTN Urgency  Initial /137   Ativan given by ER team with improvement  Monitor blood pressure  Continue with Metoprolol 25 mg daily.      #HLD  Continue with atorvastatin 80mg daily      #CAD  Continue with aspirin, Brilinta 90mg BID, and metoprolol.          Dr. Ramirez made aware.

## 2024-03-30 NOTE — PROGRESS NOTE ADULT - ASSESSMENT
62 y/o M with a pmhx of DM, HTN, HLD, CHF, asthma, CAD (with PCI x 3) who presents for evaluation of a sudden onset of shortness of breath and chest tightness while sleeping.     # Acute hypoxic respiratory failure   # Asthma exacerbation   # r/o pulmonary edema less likely   Cardiac monitoring  BIPAP at night   Continue with duo nebs ATC  started solumedrol 40mg q8hrs   Monitor pulse ox  TTE.   Monitor daily weights.  Strict Is and Os  Cardiology consult  Troponin x 2 negative.  switch to PO lasix   azithromycin per pulmonary     #HTN Urgency  Initial /137   Improved after  lasix   Continue with Metoprolol 25 mg daily.    #HLD  Continue with atorvastatin 80mg daily    #CAD  Continue with aspirin, Brilinta 90mg BID, and metoprolol.    #Progress Note Handoff  Pending (specify): echo, IV steroids, BIPAP, azithro

## 2024-03-30 NOTE — CONSULT NOTE ADULT - NSCONSULTADDITIONALINFOA_GEN_ALL_CORE
Thank you for the opportunity to participate in the care of this patient. Please admit to low risk telemetry unit. Pt is aware and in agreement of the plan of care. All questions answered and concerns addressed. Please reconsult as needed.

## 2024-03-30 NOTE — H&P ADULT - NSHPPHYSICALEXAM_GEN_ALL_CORE
Vital Signs Last 24 Hrs  HR: 83 (30 Mar 2024 04:30) (83 - 97)  BP: 126/78 (30 Mar 2024 05:52) (126/78 - 259/137)  RR: 24 (30 Mar 2024 04:25) (24 - 24)  SpO2: 98% (30 Mar 2024 04:30) (98% - 98%)    Parameters below as of 30 Mar 2024 04:25  Patient On (Oxygen Delivery Method): BiPAP/CPAP    O2 Concentration (%): 50    VITALS:     GENERAL: NAD, lying in bed comfortably  HEAD:  Atraumatic, Normocephalic  EYES: EOMI, PERRLA, conjunctiva and sclera clear  ENT: Moist mucous membranes  NECK: Supple, No JVD  CHEST/LUNG: Clear to auscultation bilaterally; No rales, rhonchi, wheezing, or rubs. Unlabored respirations  HEART: Regular rate and rhythm; No murmurs, rubs, or gallops  ABDOMEN: Bowel sounds present; Soft, Nontender, Nondistended. No hepatomegally  EXTREMITIES:  2+ Peripheral Pulses, brisk capillary refill. No clubbing, cyanosis, or edema  NERVOUS SYSTEM:  Alert & Oriented X3, speech clear. No deficits   MSK: FROM all 4 extremities, full and equal strength  SKIN: No rashes or lesions

## 2024-03-31 ENCOUNTER — TRANSCRIPTION ENCOUNTER (OUTPATIENT)
Age: 64
End: 2024-03-31

## 2024-03-31 VITALS
TEMPERATURE: 98 F | HEART RATE: 78 BPM | RESPIRATION RATE: 18 BRPM | SYSTOLIC BLOOD PRESSURE: 127 MMHG | OXYGEN SATURATION: 96 % | DIASTOLIC BLOOD PRESSURE: 75 MMHG

## 2024-03-31 LAB
ANION GAP SERPL CALC-SCNC: 13 MMOL/L — SIGNIFICANT CHANGE UP (ref 7–14)
BUN SERPL-MCNC: 30 MG/DL — HIGH (ref 10–20)
CALCIUM SERPL-MCNC: 9.4 MG/DL — SIGNIFICANT CHANGE UP (ref 8.4–10.5)
CHLORIDE SERPL-SCNC: 103 MMOL/L — SIGNIFICANT CHANGE UP (ref 98–110)
CO2 SERPL-SCNC: 23 MMOL/L — SIGNIFICANT CHANGE UP (ref 17–32)
CREAT SERPL-MCNC: 1 MG/DL — SIGNIFICANT CHANGE UP (ref 0.7–1.5)
EGFR: 86 ML/MIN/1.73M2 — SIGNIFICANT CHANGE UP
GLUCOSE SERPL-MCNC: 199 MG/DL — HIGH (ref 70–99)
HCT VFR BLD CALC: 42.7 % — SIGNIFICANT CHANGE UP (ref 42–52)
HGB BLD-MCNC: 13.8 G/DL — LOW (ref 14–18)
MAGNESIUM SERPL-MCNC: 1.9 MG/DL — SIGNIFICANT CHANGE UP (ref 1.8–2.4)
MCHC RBC-ENTMCNC: 27.1 PG — SIGNIFICANT CHANGE UP (ref 27–31)
MCHC RBC-ENTMCNC: 32.3 G/DL — SIGNIFICANT CHANGE UP (ref 32–37)
MCV RBC AUTO: 83.9 FL — SIGNIFICANT CHANGE UP (ref 80–94)
NRBC # BLD: 0 /100 WBCS — SIGNIFICANT CHANGE UP (ref 0–0)
PHOSPHATE SERPL-MCNC: 3.6 MG/DL — SIGNIFICANT CHANGE UP (ref 2.1–4.9)
PLATELET # BLD AUTO: 288 K/UL — SIGNIFICANT CHANGE UP (ref 130–400)
PMV BLD: 10.8 FL — HIGH (ref 7.4–10.4)
POTASSIUM SERPL-MCNC: 4.2 MMOL/L — SIGNIFICANT CHANGE UP (ref 3.5–5)
POTASSIUM SERPL-SCNC: 4.2 MMOL/L — SIGNIFICANT CHANGE UP (ref 3.5–5)
RBC # BLD: 5.09 M/UL — SIGNIFICANT CHANGE UP (ref 4.7–6.1)
RBC # FLD: 13.7 % — SIGNIFICANT CHANGE UP (ref 11.5–14.5)
SODIUM SERPL-SCNC: 139 MMOL/L — SIGNIFICANT CHANGE UP (ref 135–146)
WBC # BLD: 26.05 K/UL — HIGH (ref 4.8–10.8)
WBC # FLD AUTO: 26.05 K/UL — HIGH (ref 4.8–10.8)

## 2024-03-31 PROCEDURE — 99223 1ST HOSP IP/OBS HIGH 75: CPT

## 2024-03-31 PROCEDURE — 99239 HOSP IP/OBS DSCHRG MGMT >30: CPT

## 2024-03-31 RX ORDER — AZITHROMYCIN 500 MG/1
1 TABLET, FILM COATED ORAL
Qty: 1 | Refills: 0
Start: 2024-03-31 | End: 2024-03-31

## 2024-03-31 RX ADMIN — Medication 3 MILLILITER(S): at 13:45

## 2024-03-31 RX ADMIN — TICAGRELOR 90 MILLIGRAM(S): 90 TABLET ORAL at 05:30

## 2024-03-31 RX ADMIN — Medication 3 MILLILITER(S): at 09:29

## 2024-03-31 RX ADMIN — Medication 40 MILLIGRAM(S): at 05:30

## 2024-03-31 RX ADMIN — Medication 30 MILLIGRAM(S): at 11:51

## 2024-03-31 RX ADMIN — Medication 25 MILLIGRAM(S): at 05:30

## 2024-03-31 RX ADMIN — AZITHROMYCIN 255 MILLIGRAM(S): 500 TABLET, FILM COATED ORAL at 11:51

## 2024-03-31 RX ADMIN — Medication 81 MILLIGRAM(S): at 11:51

## 2024-03-31 NOTE — DISCHARGE NOTE PROVIDER - NSDCMRMEDTOKEN_GEN_ALL_CORE_FT
aspirin 81 mg oral delayed release tablet: 1 tab(s) orally once a day  atorvastatin 80 mg oral tablet: 1 tab(s) orally once a day (at bedtime)  azithromycin 500 mg oral tablet: 1 tab(s) orally every 24 hours  Lasix 20 mg oral tablet: 1 tab(s) orally once a day   metoprolol tartrate 25 mg oral tablet: 1 tab(s) orally 2 times a day  Paxil 30 mg oral tablet: 1 orally once a day  predniSONE 10 mg oral tablet: 1 tab(s) orally Take 4 tablets daily for first 3 days, than 3 tablets daily for 3 days, than 2 tablets daily for 3 days, than 1 tablet daily for 3 days  ticagrelor 90 mg oral tablet: 1 tab(s) orally every 12 hours   aspirin 81 mg oral delayed release tablet: 1 tab(s) orally once a day  atorvastatin 80 mg oral tablet: 1 tab(s) orally once a day (at bedtime)  azithromycin 500 mg oral tablet: 1 tab(s) orally every 24 hours  Lasix 20 mg oral tablet: 1 tab(s) orally once a day   metoprolol tartrate 25 mg oral tablet: 1 tab(s) orally 2 times a day  Paxil 30 mg oral tablet: 1 orally once a day  predniSONE 10 mg oral tablet: 1 tab(s) orally once a day Take 4 tablets daily for first 3 days, than 3 tablets daily for 3 days, than 2 tablets daily for 3 days, than 1 tablet daily for 3 days  ticagrelor 90 mg oral tablet: 1 tab(s) orally every 12 hours

## 2024-03-31 NOTE — DISCHARGE NOTE NURSING/CASE MANAGEMENT/SOCIAL WORK - PATIENT PORTAL LINK FT
You can access the FollowMyHealth Patient Portal offered by [facility] by registering at the following website: [email or web address]. By joining [facility]’s FollowMyHealth portal, you will also be able to view your health information using other applications (apps) compatible with our system.

## 2024-03-31 NOTE — DISCHARGE NOTE PROVIDER - CARE PROVIDERS DIRECT ADDRESSES
[email or web address]

## 2024-03-31 NOTE — CONSULT NOTE ADULT - ASSESSMENT
KATH MCCOLLUM is a 63y man with a medical history significant for asthma and CAD s/p PCI who presented initially with acute onset SOB, now admitted with acute asthma exacerbation.  Pulmonary has been consulted for further management.      IMPRESSION:    Acute exacerbation of asthma vs ADHF  -- Presented with mildly elevated BNP, trop, and clear CXR, but with severe hypertension most consistent with ADHF  ICM w/ LVEF 32%  Elevated peripheral eosinophilia (#700)  Acute hypercapnia  Acute lactic acidosis, improving  Tobacco abuse, in remission    RECOMMENDATIONS:    - Complete at least 5-days of 40mg Prednisone (or IV equivalent)  - Okay to complete 3 days of 500 mg azithromycin, though unlikely to be an exacerbation of COPD based on symptoms  - Nebulizers PRN  - Diuresis to euvolemia per primary team  - Agree with TTE  - Wean FiO2 as tolerated to maintain spO2 92-96%  - Patient will require follow-up with pulmonary for PFT and further management        ********************************* INCOMPLETE NOTE ********************************     KATH MCCOLLUM is a 63y man with a medical history significant for asthma and CAD s/p PCI who presented initially with acute onset SOB, now admitted with acute asthma exacerbation.  Pulmonary has been consulted for further management.      IMPRESSION:    Acute exacerbation of asthma vs ADHF  -- Presented with mildly elevated BNP and trop, but also wheezing, clear CXR, and no peripheral edema most consistent with primary pulmonary issue    ICM w/ LVEF 32%  Elevated peripheral eosinophilia (#700)  Acute hypercapnia  Acute lactic acidosis, improving  Tobacco abuse, in remission    RECOMMENDATIONS:    - Complete at least 5-days of 40mg Prednisone (or IV equivalent)  - Okay to complete 3 days of 500 mg azithromycin  - Please d/c with Symbicort 80 PRN (per SEDA 2019 guidelines)  - Nebulizers PRN  - Diuresis to euvolemia per primary team, euvolemic on my exam  - Agree with TTE  - Wean FiO2 as tolerated to maintain spO2 92-96%  - Ambulatory oximetry prior to d/c, if spO2 acceptable on RA no pulmonary contraindication to d/c  - Patient will require follow-up with pulmonary for PFT and further management       KATH MCCOLLUM is a 63y man with a medical history significant for asthma and CAD s/p PCI who presented initially with acute onset SOB, now admitted with acute asthma exacerbation.  Pulmonary has been consulted for further management.      IMPRESSION:    Acute exacerbation of asthma vs ADHF  -- Presented with mildly elevated BNP and trop, but also wheezing, clear CXR, and no peripheral edema most consistent with primary pulmonary issue    ICM w/ LVEF 32%  Elevated peripheral eosinophilia (#700)  Acute hypercapnia  Acute lactic acidosis, improving  Tobacco abuse, in remission (~60PY, quit 2006)    RECOMMENDATIONS:    - Complete at least 5-days of 40mg Prednisone (or IV equivalent)  - Okay to complete 3 days of 500 mg azithromycin  - Please d/c with Symbicort 80 PRN (per SEDA 2019 guidelines)  - Nebulizers PRN  - Diuresis to euvolemia per primary team, euvolemic on my exam  - Agree with TTE  - Wean FiO2 as tolerated to maintain spO2 92-96%  - Ambulatory oximetry prior to d/c, if spO2 acceptable on RA no pulmonary contraindication to d/c  - Patient will require follow-up with pulmonary for PFT and further management

## 2024-03-31 NOTE — DISCHARGE NOTE PROVIDER - HOSPITAL COURSE
62 y/o M with a pmhx of DM, HTN, HLD, CHF, asthma, CAD (with PCI x 3) who presents for evaluation of a sudden onset of shortness of breath and chest tightness while sleeping.     # Acute hypoxic respiratory failure   # Asthma exacerbation vs ADHF   # r/o pulmonary edema less likely   - Patient admitted to tele  - Initially on BIPAP, now 100% on 2L NC, wean off as tolerated   - tx w/ duonebs q6, solumedrol 40mg q8  - ECHO from 3/2022: EF 32%, Multiple WMA's, Pending repeat ECHO   - Daily weights, I&O-s strict  - switch to PO lasix   - Pulm and cardio consult appreciated   - will dc on prednisone taper- 40mg x3 d, 30mg x3d, 20mg x3d, 10mg 3d and azithromycin 500mg x1d   - will d/c Symbicort 80 PRN (per SEDA 2019 guidelines)  - Outpatient follow-up with pulmonary for PFT and further management      #HTN Urgency  Initial /137   Improved after  lasix   Continue with Metoprolol 25 mg daily.    #HLD  Continue with atorvastatin 80mg daily    #CAD  Continue with aspirin, Brilinta 90mg BID, and metoprolol.   64 y/o M with a pmhx of DM, HTN, HLD, CHF, asthma, CAD (with PCI x 3) who presents for evaluation of a sudden onset of shortness of breath and chest tightness while sleeping.     # Acute hypoxic respiratory failure   # Asthma exacerbation vs ADHF   # r/o pulmonary edema less likely   - Patient admitted to tele  - Initially on BIPAP, now 100% on 2L NC, wean off as tolerated   - tx w/ duonebs q6, solumedrol 40mg q8  - ECHO from 3/2022: EF 32%, Multiple WMA's, Pending repeat ECHO   - Daily weights, I&O-s strict  - switch to PO lasix   - Pulm and cardio consult appreciated   - will dc on prednisone taper- 40mg x3 d, 30mg x3d, 20mg x3d, 10mg 3d and azithromycin 500mg x1d   - will dc on Symbicort 80 PRN (per SEDA 2019 guidelines)  - Outpatient follow-up with pulmonary for PFT and further management      #HTN Urgency  Initial /137   Improved after  lasix   Continue with Metoprolol 25 mg daily.    #HLD  Continue with atorvastatin 80mg daily    #CAD  Continue with aspirin, Brilinta 90mg BID, and metoprolol.

## 2024-03-31 NOTE — CONSULT NOTE ADULT - SUBJECTIVE AND OBJECTIVE BOX
HPI:  Patient is a 64 y/o M with a pmhx of DM, HTN, HLD, CHF, asthma, CAD (with PCI x 3) who presents for evaluation of a sudden onset of shortness of breath and chest tightness while sleeping. 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 Symptoms lasted for 2 days and then slightly improved with the use of albuterol inhaler. However earlier today, symptoms reoccurred and progressively worsened into the night prompting evaluation to the ER. Patient also reports a non productive cough since onset of symptoms. Denies fever, chills, n/v/d, urinary or bowel complaints, chest pain, leg pain/swelling, recent travel. States hasn't had a asthma attack in several years. Doesn't have a pulmonologist States he follows up with cardiologist Dr. Vega Mcmahon and is scheduled for an Echo.  (30 Mar 2024 06:11)        HPI-Cardiology   Pt with the above Hx and HPI, evaluated at bedside. Pt presented for eval of sudden unset SOB. Pt states that he was exposed to construction dust about 4 days ago and 2 days later he developed SOB, associated with "chest tightness" which was getting worse and that prompted him to come to the hospital. Pt denies similar episodes in the past and states he hasn't had an asthma attack in years, Currently denies any CP, SOB, palpitations, orthopnea, dizziness/lightheadedness or nausea/vomiting. Radiology tests and hospital records, were reviewed, as well as previous notes on this patient.      PAST MEDICAL & SURGICAL HISTORY  GERD (gastroesophageal reflux disease)    High cholesterol    Chronic gastric ulcer without hemorrhage and without perforation    Ulcer of esophagus    CAD S/P percutaneous coronary angioplasty    DM (diabetes mellitus)    Hiatal hernia with GERD    Coronary artery disease involving other coronary artery bypass graft, angina presence unspecified    Cardiac arrhythmia  bypass surgery 9 years ago    H/O right coronary artery stent placement  August 17, 2020    S/P cholecystectomy        FAMILY HISTORY:  FAMILY HISTORY:  FH: CAD (coronary artery disease) (Grandparent)          ALLERGIES:  No Known Allergies      MEDICATIONS:  MEDICATIONS  (STANDING):  albuterol    90 MICROgram(s) HFA Inhaler 2 Puff(s) Inhalation every 6 hours  albuterol/ipratropium for Nebulization 3 milliLiter(s) Nebulizer every 6 hours  aspirin enteric coated 81 milliGRAM(s) Oral daily  atorvastatin 80 milliGRAM(s) Oral at bedtime  azithromycin  IVPB 500 milliGRAM(s) IV Intermittent <User Schedule>  chlorhexidine 2% Cloths 1 Application(s) Topical <User Schedule>  furosemide   Injectable 40 milliGRAM(s) IV Push daily  methylPREDNISolone sodium succinate Injectable 40 milliGRAM(s) IV Push every 8 hours  metoprolol tartrate 25 milliGRAM(s) Oral daily  PARoxetine 30 milliGRAM(s) Oral daily  ticagrelor 90 milliGRAM(s) Oral two times a day    MEDICATIONS  (PRN):      HOME MEDICATIONS:  Home Medications:  aspirin 81 mg oral delayed release tablet: 1 tab(s) orally once a day (17 Nov 2023 07:15)  atorvastatin 80 mg oral tablet: 1 tab(s) orally once a day (at bedtime) (17 Nov 2023 07:15)  metoprolol tartrate 25 mg oral tablet: 1 tab(s) orally 2 times a day (17 Nov 2023 07:15)  Paxil 30 mg oral tablet: 1 orally once a day (17 Nov 2023 07:15)      VITALS:   T(F): 95.3 (03-30 @ 06:52), Max: 95.3 (03-30 @ 06:52)  HR: 76 (03-30 @ 06:52) (76 - 97)  BP: 143/82 (03-30 @ 06:52) (126/78 - 259/137)  BP(mean): --  RR: 18 (03-30 @ 06:52) (18 - 24)  SpO2: 100% (03-30 @ 06:52) (98% - 100%)    I&O's Summary    30 Mar 2024 07:01  -  30 Mar 2024 10:38  --------------------------------------------------------  IN: 0 mL / OUT: 600 mL / NET: -600 mL        REVIEW OF SYSTEMS:  See HPI        PHYSICAL EXAM:  NEURO: patient is awake , alert and oriented  GEN: Not in acute distress  NECK: no thyroid enlargement, no JVD  LUNGS: Clear to auscultation bilaterally   CARDIOVASCULAR: S1/S2 present, RRR , no murmurs or rubs, no carotid bruits,  + PP bilaterally  ABD: Soft, non-tender, non-distended, +BS  EXT: No LAURA  SKIN: Intact        LABS:                        15.7   13.98 )-----------( 341      ( 30 Mar 2024 04:48 )             49.9     03-30    144  |  103  |  26<H>  ----------------------------<  146<H>  4.1   |  23  |  1.3    Ca    9.6      30 Mar 2024 04:47    TPro  7.1  /  Alb  4.2  /  TBili  1.1  /  DBili  x   /  AST  29  /  ALT  37  /  AlkPhos  116<H>  03-30      Lactate, Blood: 4.3 mmol/L *HH* (03-30-24 @ 04:47)          RADIOLOGY:  < from: TTE Echo Complete w/o Contrast w/ Doppler (03.22.22 @ 11:30) >    Summary:  1. Multiple left ventricular regional wall motion abnormalities exist.   See wall motion findings.   2. LV Ejection Fraction by Baldwin's Method with a biplane EF of 32 %.   3. Dilated cardiomyopathy.   4. Moderately increased left ventricular internal cavity size.   5. Spectral Doppler shows impaired relaxation pattern of left   ventricular myocardial filling (Grade I diastolic dysfunction).   6. Moderately enlarged left atrium.   7. Normal right atrial size.   8. Mild mitral annular calcification.   9. Mild thickening of the anterior and posterior mitral valve leaflets.  10. No evidence of mitral valve regurgitation.  11. Mild tricuspid regurgitation.    < end of copied text >          -CATHETERIZATION:  < from: Cardiac Catheterization (03.01.23 @ 09:48) >  DiagnosticConclusions:   Coronary Angiography   LM   Left main artery: Angiography shows minor in-stent irregularities.  Previous stent patent      There is no significant in-stent disease. There is significant  3-vessel coronary artery disease (LAD,  Circumflex and RCA). Bypass grafts are patent. Severe coronary  calcification is present.    < end of copied text >          
Patient is a 63y old  Male who presents with a chief complaint of     HPI:  Patient is a 62 y/o M with a pmhx of DM, HTN, HLD, CHF, asthma, CAD (with PCI x 3) who presents for evaluation of a sudden onset of shortness of breath and chest tightness while sleeping. 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 Symptoms lasted for 2 days and then slightly improved with the use of albuterol inhaler. However earlier today, symptoms reoccurred and progressively worsened into the night prompting evaluation to the ER. Patient also reports a non productive cough since onset of symptoms. Denies fever, chills, n/v/d, urinary or bowel complaints, chest pain, leg pain/swelling, recent travel. States hasn't had a asthma attack in several years. Doesn't have a pulmonologist States he follows up with cardiologist Dr. Vega Mcmahon and is scheduled for an Echo.  (30 Mar 2024 06:11)      PAST MEDICAL & SURGICAL HISTORY:  GERD (gastroesophageal reflux disease)      High cholesterol      Chronic gastric ulcer without hemorrhage and without perforation      Ulcer of esophagus      CAD S/P percutaneous coronary angioplasty      DM (diabetes mellitus)      Hiatal hernia with GERD      Coronary artery disease involving other coronary artery bypass graft, angina presence unspecified      Cardiac arrhythmia  bypass surgery 9 years ago      H/O right coronary artery stent placement  August 17, 2020      S/P cholecystectomy          SOCIAL HX:   Smoking                         ETOH                            Other    FAMILY HISTORY:  FH: CAD (coronary artery disease) (Grandparent)    .  No cardiovascular or pulmonary family history     REVIEW OF SYSTEMS:    All ROS are negative exept per HPI       Allergies    No Known Allergies    Intolerances          PHYSICAL EXAM  Vital Signs Last 24 Hrs  T(C): 36.9 (31 Mar 2024 05:07), Max: 36.9 (31 Mar 2024 05:07)  T(F): 98.5 (31 Mar 2024 05:07), Max: 98.5 (31 Mar 2024 05:07)  HR: 76 (31 Mar 2024 05:07) (76 - 94)  BP: 145/73 (31 Mar 2024 05:07) (125/66 - 145/73)  BP(mean): --  RR: 20 (31 Mar 2024 05:07) (18 - 20)  SpO2: 100% (31 Mar 2024 05:07) (94% - 100%)    Parameters below as of 31 Mar 2024 05:07  Patient On (Oxygen Delivery Method): nasal cannula  O2 Flow (L/min): 2      Constitutional: no acute distress, well nourished well developed  Neuro: moving all 4 limbs spontaneously, no facial droop or dysarthria  HEENT: NCAT, anicteric  Neck: no visible lymphadenopathy or goiter  Pulm: no respiratory distress. clear to auscultation bilaterally  Cardiac: extremities appear pink and well-perfused.  regular rhythm and rate, no murmur detected  Abdomen: non-distended  Extremities: no peripheral edema  Skin: no visible rashes        LABS:                          13.8   26.05 )-----------( 288      ( 31 Mar 2024 06:31 )             42.7                                               03-31    139  |  103  |  30<H>  ----------------------------<  199<H>  4.2   |  23  |  1.0    Ca    9.4      31 Mar 2024 06:31  Phos  3.6     03-31  Mg     1.9     03-31    TPro  7.1  /  Alb  4.2  /  TBili  1.1  /  DBili  x   /  AST  29  /  ALT  37  /  AlkPhos  116<H>  03-30                                             Urinalysis Basic - ( 31 Mar 2024 06:31 )    Color: x / Appearance: x / SG: x / pH: x  Gluc: 199 mg/dL / Ketone: x  / Bili: x / Urobili: x   Blood: x / Protein: x / Nitrite: x   Leuk Esterase: x / RBC: x / WBC x   Sq Epi: x / Non Sq Epi: x / Bacteria: x                                                  LIVER FUNCTIONS - ( 30 Mar 2024 04:47 )  Alb: 4.2 g/dL / Pro: 7.1 g/dL / ALK PHOS: 116 U/L / ALT: 37 U/L / AST: 29 U/L / GGT: x                                                                                                MEDICATIONS  (STANDING):  albuterol    90 MICROgram(s) HFA Inhaler 2 Puff(s) Inhalation every 6 hours  albuterol/ipratropium for Nebulization 3 milliLiter(s) Nebulizer every 6 hours  aspirin enteric coated 81 milliGRAM(s) Oral daily  atorvastatin 80 milliGRAM(s) Oral at bedtime  azithromycin  IVPB 500 milliGRAM(s) IV Intermittent <User Schedule>  chlorhexidine 2% Cloths 1 Application(s) Topical <User Schedule>  furosemide    Tablet 40 milliGRAM(s) Oral daily  methylPREDNISolone sodium succinate Injectable 40 milliGRAM(s) IV Push every 8 hours  metoprolol tartrate 25 milliGRAM(s) Oral daily  PARoxetine 30 milliGRAM(s) Oral daily  ticagrelor 90 milliGRAM(s) Oral two times a day    MEDICATIONS  (PRN):      X-Rays reviewed:    CXR interpreted by me:  Chest x-ray performed 3/30 images and radiologist read reviewed, compared with film from March 2022.  By my read demonstrating clear bilateral lung fields without intrathoracic pathology.
Patient is a 62 yo male with a PMH of DM, HTN, HLD, CHF, asthma, CAD (with PCI x 3) who presents with complaint of acute onset SOB which started while he was asleep. Patient states symptoms began 4 days ago after being exposed to dust prompting frequent use of albuterol inhaler. Due to acute onset of symptoms, pt presented to the ED. Upon ED presentation, the pt was noted to be hypoxic in the high 80s with increased work of breathing. The pt was placed on BiPAP with improvement of symptoms and oxygenation. Critical care was consulted for further assessment. Upon critical care evaluation, the pt was awake and alert and speaking in full sentences. No other complaints or concerns noted at this time.     ROS: Denies CP, palpitations, dyspnea, ABD pain, LE pain, n/v/d/constipation, admits to non-productive cough    T(C): 35.2 (03-30-24 @ 06:52), Max: 35.2 (03-30-24 @ 06:52)  HR: 76 (03-30-24 @ 06:52) (76 - 97)  BP: 143/82 (03-30-24 @ 06:52) (126/78 - 259/137)  RR: 18 (03-30-24 @ 06:52) (18 - 24)  SpO2: 100% (03-30-24 @ 06:52) (98% - 100%)    CONSTITUTIONAL: Well groomed, no apparent distress  EYES: EOMI, No conjunctival or scleral injection, non-icteric  ENMT: Oral mucosa with moist membranes  RESP: No respiratory distress, no use of accessory muscles  CV: RRR, +S1S2, no MRG; no JVD; no peripheral edema  GI: Soft, NT, ND, no rebound  LYMPH: No cervical LAD or tenderness  MSK:No digital clubbing or cyanosis  SKIN: No rashes or ulcers noted; no subcutaneous nodules or induration palpable  NEURO:  sensation intact in upper and lower extremities b/l to light touch   PSYCH: Appropriate insight/judgment; A+O x 3, mood and affect appropriate, recent/remote memory intact                          15.7   13.98 )-----------( 341      ( 30 Mar 2024 04:48 )             49.9   03-30    144  |  103  |  26<H>  ----------------------------<  146<H>  4.1   |  23  |  1.3    Ca    9.6      30 Mar 2024 04:47    TPro  7.1  /  Alb  4.2  /  TBili  1.1  /  DBili  x   /  AST  29  /  ALT  37  /  AlkPhos  116<H>  03-30

## 2024-03-31 NOTE — DISCHARGE NOTE PROVIDER - PROVIDER TOKENS
PROVIDER:[TOKEN:[[id number]],FOLLOWUP:[Routine]],PROVIDER:[TOKEN:[[id number]],FOLLOWUP:[Routine]]

## 2024-03-31 NOTE — DISCHARGE NOTE PROVIDER - NSDCCPCAREPLAN_GEN_ALL_CORE_FT
PRINCIPAL DISCHARGE DIAGNOSIS  Diagnosis: Acute respiratory failure with hypercapnia  Assessment and Plan of Treatment: You were seen and treated by the medical and pulmonaolgy team for Acute hypoxic respiratory failure secondary to Asthma exacerbation   - tx w/ duonebs q6, solumedrol 40mg q8, azithromycin  - will discharge on prednisone taper- 40mg x3 d, 30mg x3d, 20mg x3d, 10mg 3d and azithromycin 500mg x1d   - Outpatient follow-up with pulmonary for PFT recommended  - If fever, chills, n/v, chest pain, worsening shortness of breath, recommend coming back to the ED     PRINCIPAL DISCHARGE DIAGNOSIS  Diagnosis: Acute respiratory failure with hypercapnia  Assessment and Plan of Treatment: You were seen and treated by the medical and pulmonaolgy team for Acute hypoxic respiratory failure secondary to Asthma exacerbation   - tx w/ duonebs q6, solumedrol 40mg q8, azithromycin  - will discharge on prednisone taper- 40mg x3 d, 30mg x3d, 20mg x3d, 10mg 3d, azithromycin 500mg x1d, and symbicort as needed  - Outpatient follow-up with pulmonary for PFT recommended  - If fever, chills, n/v, chest pain, worsening shortness of breath, recommend coming back to the ED

## 2024-03-31 NOTE — DISCHARGE NOTE PROVIDER - CARE PROVIDER_API CALL
Howard Ball  Pulmonary Disease  [address] [place] [address], [address]  [place] [address]  Phone: [phone]  Fax: [phone]  Follow Up Time: Routine    Conner Warner  Internal Medicine  [address] [place] [address]  Phone: [phone]  Fax: [phone]  Follow Up Time: Routine

## 2024-03-31 NOTE — DISCHARGE NOTE NURSING/CASE MANAGEMENT/SOCIAL WORK - NSDCPEFALRISK_GEN_ALL_CORE
For information on Fall & Injury Prevention, visit: [email or web address] OR  [email or web address] OR  https://www.cdc.gov/steadi/patient.html

## 2024-04-05 ENCOUNTER — APPOINTMENT (OUTPATIENT)
Dept: PULMONOLOGY | Facility: CLINIC | Age: 64
End: 2024-04-05
Payer: MEDICAID

## 2024-04-05 VITALS
OXYGEN SATURATION: 98 % | DIASTOLIC BLOOD PRESSURE: 70 MMHG | BODY MASS INDEX: 30.29 KG/M2 | SYSTOLIC BLOOD PRESSURE: 118 MMHG | HEIGHT: 67 IN | HEART RATE: 82 BPM | WEIGHT: 193 LBS

## 2024-04-05 DIAGNOSIS — R04.2 HEMOPTYSIS: ICD-10-CM

## 2024-04-05 LAB
CULTURE RESULTS: SIGNIFICANT CHANGE UP
SPECIMEN SOURCE: SIGNIFICANT CHANGE UP

## 2024-04-05 PROCEDURE — G2211 COMPLEX E/M VISIT ADD ON: CPT | Mod: NC,1L

## 2024-04-05 PROCEDURE — 99214 OFFICE O/P EST MOD 30 MIN: CPT

## 2024-04-05 RX ORDER — BUDESONIDE AND FORMOTEROL FUMARATE DIHYDRATE 160; 4.5 UG/1; UG/1
160-4.5 AEROSOL RESPIRATORY (INHALATION) TWICE DAILY
Qty: 1 | Refills: 2 | Status: ACTIVE | COMMUNITY
Start: 2024-04-05 | End: 1900-01-01

## 2024-04-05 NOTE — HISTORY OF PRESENT ILLNESS
[TextBox_4] : 63 year old man with HFREF, former smoker of 30 PY and quit years ago, asthma/copd likely, recent admission for pulm edema, hemoptysis, shortness of breath. He is on Albuterol PRN, and is finishing a prednisone tapered course. He feels better. Lungs clear on exam He is on diuretics as well.

## 2024-04-05 NOTE — ASSESSMENT
[FreeTextEntry1] : Asthma COPD  Periph eos noted on CBC  Start Symbicort 160 BID  Albuterol as needed  Finish steroid course  Check PFTs   Mild hemoptysis  Improving  Chest CT never done  Former smoker  CT chest NC  HFREF - 32% Following with cardiology   1 month follow up

## 2024-04-06 DIAGNOSIS — J96.01 ACUTE RESPIRATORY FAILURE WITH HYPOXIA: ICD-10-CM

## 2024-04-06 DIAGNOSIS — K44.9 DIAPHRAGMATIC HERNIA WITHOUT OBSTRUCTION OR GANGRENE: ICD-10-CM

## 2024-04-06 DIAGNOSIS — J45.901 UNSPECIFIED ASTHMA WITH (ACUTE) EXACERBATION: ICD-10-CM

## 2024-04-06 DIAGNOSIS — I25.10 ATHEROSCLEROTIC HEART DISEASE OF NATIVE CORONARY ARTERY WITHOUT ANGINA PECTORIS: ICD-10-CM

## 2024-04-06 DIAGNOSIS — J96.02 ACUTE RESPIRATORY FAILURE WITH HYPERCAPNIA: ICD-10-CM

## 2024-04-06 DIAGNOSIS — I50.20 UNSPECIFIED SYSTOLIC (CONGESTIVE) HEART FAILURE: ICD-10-CM

## 2024-04-06 DIAGNOSIS — Z82.49 FAMILY HISTORY OF ISCHEMIC HEART DISEASE AND OTHER DISEASES OF THE CIRCULATORY SYSTEM: ICD-10-CM

## 2024-04-06 DIAGNOSIS — Z79.899 OTHER LONG TERM (CURRENT) DRUG THERAPY: ICD-10-CM

## 2024-04-06 DIAGNOSIS — K21.9 GASTRO-ESOPHAGEAL REFLUX DISEASE WITHOUT ESOPHAGITIS: ICD-10-CM

## 2024-04-06 DIAGNOSIS — I16.0 HYPERTENSIVE URGENCY: ICD-10-CM

## 2024-04-06 DIAGNOSIS — Z79.82 LONG TERM (CURRENT) USE OF ASPIRIN: ICD-10-CM

## 2024-04-06 DIAGNOSIS — I25.2 OLD MYOCARDIAL INFARCTION: ICD-10-CM

## 2024-04-06 DIAGNOSIS — E78.00 PURE HYPERCHOLESTEROLEMIA, UNSPECIFIED: ICD-10-CM

## 2024-04-06 DIAGNOSIS — Z95.1 PRESENCE OF AORTOCORONARY BYPASS GRAFT: ICD-10-CM

## 2024-04-06 DIAGNOSIS — I11.0 HYPERTENSIVE HEART DISEASE WITH HEART FAILURE: ICD-10-CM

## 2024-04-06 DIAGNOSIS — E11.9 TYPE 2 DIABETES MELLITUS WITHOUT COMPLICATIONS: ICD-10-CM

## 2024-04-06 DIAGNOSIS — Z87.891 PERSONAL HISTORY OF NICOTINE DEPENDENCE: ICD-10-CM

## 2024-04-06 DIAGNOSIS — Z95.5 PRESENCE OF CORONARY ANGIOPLASTY IMPLANT AND GRAFT: ICD-10-CM

## 2024-05-01 ENCOUNTER — APPOINTMENT (OUTPATIENT)
Dept: PULMONOLOGY | Facility: CLINIC | Age: 64
End: 2024-05-01
Payer: MEDICAID

## 2024-05-01 VITALS
HEART RATE: 77 BPM | WEIGHT: 193 LBS | HEIGHT: 67 IN | SYSTOLIC BLOOD PRESSURE: 142 MMHG | DIASTOLIC BLOOD PRESSURE: 80 MMHG | OXYGEN SATURATION: 98 % | BODY MASS INDEX: 30.29 KG/M2

## 2024-05-01 DIAGNOSIS — R91.8 OTHER NONSPECIFIC ABNORMAL FINDING OF LUNG FIELD: ICD-10-CM

## 2024-05-01 DIAGNOSIS — J44.89 OTHER SPECIFIED CHRONIC OBSTRUCTIVE PULMONARY DISEASE: ICD-10-CM

## 2024-05-01 PROCEDURE — 94010 BREATHING CAPACITY TEST: CPT

## 2024-05-01 PROCEDURE — G2211 COMPLEX E/M VISIT ADD ON: CPT | Mod: NC,1L

## 2024-05-01 PROCEDURE — 94729 DIFFUSING CAPACITY: CPT

## 2024-05-01 PROCEDURE — 94727 GAS DIL/WSHOT DETER LNG VOL: CPT

## 2024-05-01 PROCEDURE — 99214 OFFICE O/P EST MOD 30 MIN: CPT | Mod: 25

## 2024-05-01 NOTE — HISTORY OF PRESENT ILLNESS
[TextBox_4] : 63 year old man with HFREF, former smoker of 30 PY and quit years ago, asthma/copd likely, recent admission for pulm edema, hemoptysis, shortness of breath. He is on Albuterol PRN, and is finishing a prednisone tapered course. He feels better. Lungs clear on exam He is on diuretics as well.  PFTs with mild obstruction and normal DLCO. Cough and hemoptysis resolved. CT chest noted with 6 mm nodules. Otherwise has been stable

## 2024-05-01 NOTE — ASSESSMENT
[FreeTextEntry1] : Asthma COPD  Periph eos noted on CBC  Using Symbicort PRN  Albuterol as needed - rarely needs it  PFTs with mild obstruction and normal DLCO  Lungs clear today    Mild hemoptysis  Resolved  No parenchymal abnormality on CT   Lung nodules  Measuring 6 mm  CT in 1 year   HFREF - 32% Following with cardiology   6 months follow up

## 2024-05-23 NOTE — ASU PATIENT PROFILE, ADULT - TOBACCO USE
There are left transverse process fractures of C5 and C6.  CTA without evidence of vascular injury.  Non-operative management.   Cervical immobilization for 6 weeks.  Hellen Hermosillo MD. Neurosurgeon. [facility].   Former smoker

## 2024-07-15 ENCOUNTER — APPOINTMENT (OUTPATIENT)
Dept: PULMONOLOGY | Facility: CLINIC | Age: 64
End: 2024-07-15
Payer: MEDICAID

## 2024-07-15 VITALS
HEIGHT: 67 IN | HEART RATE: 84 BPM | OXYGEN SATURATION: 97 % | DIASTOLIC BLOOD PRESSURE: 70 MMHG | SYSTOLIC BLOOD PRESSURE: 120 MMHG | BODY MASS INDEX: 30.61 KG/M2 | WEIGHT: 195 LBS

## 2024-07-15 DIAGNOSIS — J44.89 OTHER SPECIFIED CHRONIC OBSTRUCTIVE PULMONARY DISEASE: ICD-10-CM

## 2024-07-15 DIAGNOSIS — R91.8 OTHER NONSPECIFIC ABNORMAL FINDING OF LUNG FIELD: ICD-10-CM

## 2024-07-15 PROCEDURE — G2211 COMPLEX E/M VISIT ADD ON: CPT | Mod: NC

## 2024-07-15 PROCEDURE — 99214 OFFICE O/P EST MOD 30 MIN: CPT

## 2024-07-25 ENCOUNTER — EMERGENCY (EMERGENCY)
Facility: HOSPITAL | Age: 64
LOS: 0 days | Discharge: HOME | End: 2024-07-26
Attending: STUDENT IN AN ORGANIZED HEALTH CARE EDUCATION/TRAINING PROGRAM
Payer: MEDICAID

## 2024-07-25 VITALS
RESPIRATION RATE: 18 BRPM | SYSTOLIC BLOOD PRESSURE: 197 MMHG | TEMPERATURE: 98 F | HEART RATE: 62 BPM | DIASTOLIC BLOOD PRESSURE: 75 MMHG | OXYGEN SATURATION: 98 % | WEIGHT: 197.98 LBS | HEIGHT: 67 IN

## 2024-07-25 DIAGNOSIS — Z95.5 PRESENCE OF CORONARY ANGIOPLASTY IMPLANT AND GRAFT: ICD-10-CM

## 2024-07-25 DIAGNOSIS — R51.9 HEADACHE, UNSPECIFIED: ICD-10-CM

## 2024-07-25 DIAGNOSIS — Z95.5 PRESENCE OF CORONARY ANGIOPLASTY IMPLANT AND GRAFT: Chronic | ICD-10-CM

## 2024-07-25 DIAGNOSIS — I25.810 ATHEROSCLEROSIS OF CORONARY ARTERY BYPASS GRAFT(S) WITHOUT ANGINA PECTORIS: Chronic | ICD-10-CM

## 2024-07-25 DIAGNOSIS — R03.0 ELEVATED BLOOD-PRESSURE READING, WITHOUT DIAGNOSIS OF HYPERTENSION: ICD-10-CM

## 2024-07-25 DIAGNOSIS — Z87.891 PERSONAL HISTORY OF NICOTINE DEPENDENCE: ICD-10-CM

## 2024-07-25 DIAGNOSIS — K21.9 GASTRO-ESOPHAGEAL REFLUX DISEASE WITHOUT ESOPHAGITIS: ICD-10-CM

## 2024-07-25 DIAGNOSIS — G43.909 MIGRAINE, UNSPECIFIED, NOT INTRACTABLE, WITHOUT STATUS MIGRAINOSUS: ICD-10-CM

## 2024-07-25 DIAGNOSIS — E78.5 HYPERLIPIDEMIA, UNSPECIFIED: ICD-10-CM

## 2024-07-25 DIAGNOSIS — Z79.82 LONG TERM (CURRENT) USE OF ASPIRIN: ICD-10-CM

## 2024-07-25 DIAGNOSIS — Z79.02 LONG TERM (CURRENT) USE OF ANTITHROMBOTICS/ANTIPLATELETS: ICD-10-CM

## 2024-07-25 DIAGNOSIS — E11.9 TYPE 2 DIABETES MELLITUS WITHOUT COMPLICATIONS: ICD-10-CM

## 2024-07-25 DIAGNOSIS — Z90.49 ACQUIRED ABSENCE OF OTHER SPECIFIED PARTS OF DIGESTIVE TRACT: Chronic | ICD-10-CM

## 2024-07-25 DIAGNOSIS — I11.0 HYPERTENSIVE HEART DISEASE WITH HEART FAILURE: ICD-10-CM

## 2024-07-25 DIAGNOSIS — I50.9 HEART FAILURE, UNSPECIFIED: ICD-10-CM

## 2024-07-25 DIAGNOSIS — I25.10 ATHEROSCLEROTIC HEART DISEASE OF NATIVE CORONARY ARTERY WITHOUT ANGINA PECTORIS: ICD-10-CM

## 2024-07-25 DIAGNOSIS — I49.9 CARDIAC ARRHYTHMIA, UNSPECIFIED: Chronic | ICD-10-CM

## 2024-07-25 LAB
ALBUMIN SERPL ELPH-MCNC: 3.8 G/DL — SIGNIFICANT CHANGE UP (ref 3.5–5.2)
ALP SERPL-CCNC: 89 U/L — SIGNIFICANT CHANGE UP (ref 30–115)
ALT FLD-CCNC: 27 U/L — SIGNIFICANT CHANGE UP (ref 0–41)
ANION GAP SERPL CALC-SCNC: 8 MMOL/L — SIGNIFICANT CHANGE UP (ref 7–14)
AST SERPL-CCNC: 18 U/L — SIGNIFICANT CHANGE UP (ref 0–41)
BASOPHILS # BLD AUTO: 0.07 K/UL — SIGNIFICANT CHANGE UP (ref 0–0.2)
BASOPHILS NFR BLD AUTO: 0.6 % — SIGNIFICANT CHANGE UP (ref 0–1)
BILIRUB SERPL-MCNC: 0.8 MG/DL — SIGNIFICANT CHANGE UP (ref 0.2–1.2)
BUN SERPL-MCNC: 18 MG/DL — SIGNIFICANT CHANGE UP (ref 10–20)
CALCIUM SERPL-MCNC: 9.5 MG/DL — SIGNIFICANT CHANGE UP (ref 8.4–10.5)
CHLORIDE SERPL-SCNC: 102 MMOL/L — SIGNIFICANT CHANGE UP (ref 98–110)
CO2 SERPL-SCNC: 30 MMOL/L — SIGNIFICANT CHANGE UP (ref 17–32)
CREAT SERPL-MCNC: 1.2 MG/DL — SIGNIFICANT CHANGE UP (ref 0.7–1.5)
EGFR: 68 ML/MIN/1.73M2 — SIGNIFICANT CHANGE UP
EOSINOPHIL # BLD AUTO: 0.61 K/UL — SIGNIFICANT CHANGE UP (ref 0–0.7)
EOSINOPHIL NFR BLD AUTO: 4.9 % — SIGNIFICANT CHANGE UP (ref 0–8)
GLUCOSE SERPL-MCNC: 110 MG/DL — HIGH (ref 70–99)
HCT VFR BLD CALC: 42.7 % — SIGNIFICANT CHANGE UP (ref 42–52)
HGB BLD-MCNC: 13.5 G/DL — LOW (ref 14–18)
IMM GRANULOCYTES NFR BLD AUTO: 0.4 % — HIGH (ref 0.1–0.3)
LYMPHOCYTES # BLD AUTO: 28.3 % — SIGNIFICANT CHANGE UP (ref 20.5–51.1)
LYMPHOCYTES # BLD AUTO: 3.54 K/UL — HIGH (ref 1.2–3.4)
MAGNESIUM SERPL-MCNC: 2.1 MG/DL — SIGNIFICANT CHANGE UP (ref 1.8–2.4)
MCHC RBC-ENTMCNC: 27.2 PG — SIGNIFICANT CHANGE UP (ref 27–31)
MCHC RBC-ENTMCNC: 31.6 G/DL — LOW (ref 32–37)
MCV RBC AUTO: 86.1 FL — SIGNIFICANT CHANGE UP (ref 80–94)
MONOCYTES # BLD AUTO: 1.25 K/UL — HIGH (ref 0.1–0.6)
MONOCYTES NFR BLD AUTO: 10 % — HIGH (ref 1.7–9.3)
NEUTROPHILS # BLD AUTO: 6.97 K/UL — HIGH (ref 1.4–6.5)
NEUTROPHILS NFR BLD AUTO: 55.8 % — SIGNIFICANT CHANGE UP (ref 42.2–75.2)
NRBC # BLD: 0 /100 WBCS — SIGNIFICANT CHANGE UP (ref 0–0)
PLATELET # BLD AUTO: 276 K/UL — SIGNIFICANT CHANGE UP (ref 130–400)
PMV BLD: 10.3 FL — SIGNIFICANT CHANGE UP (ref 7.4–10.4)
POTASSIUM SERPL-MCNC: 4.2 MMOL/L — SIGNIFICANT CHANGE UP (ref 3.5–5)
POTASSIUM SERPL-SCNC: 4.2 MMOL/L — SIGNIFICANT CHANGE UP (ref 3.5–5)
PROT SERPL-MCNC: 6.4 G/DL — SIGNIFICANT CHANGE UP (ref 6–8)
RBC # BLD: 4.96 M/UL — SIGNIFICANT CHANGE UP (ref 4.7–6.1)
RBC # FLD: 13 % — SIGNIFICANT CHANGE UP (ref 11.5–14.5)
SODIUM SERPL-SCNC: 140 MMOL/L — SIGNIFICANT CHANGE UP (ref 135–146)
WBC # BLD: 12.49 K/UL — HIGH (ref 4.8–10.8)
WBC # FLD AUTO: 12.49 K/UL — HIGH (ref 4.8–10.8)

## 2024-07-25 PROCEDURE — 99285 EMERGENCY DEPT VISIT HI MDM: CPT | Mod: 25

## 2024-07-25 PROCEDURE — 85025 COMPLETE CBC W/AUTO DIFF WBC: CPT

## 2024-07-25 PROCEDURE — 99284 EMERGENCY DEPT VISIT MOD MDM: CPT | Mod: 25

## 2024-07-25 PROCEDURE — 93005 ELECTROCARDIOGRAM TRACING: CPT

## 2024-07-25 PROCEDURE — 80053 COMPREHEN METABOLIC PANEL: CPT

## 2024-07-25 PROCEDURE — 93010 ELECTROCARDIOGRAM REPORT: CPT

## 2024-07-25 PROCEDURE — 96374 THER/PROPH/DIAG INJ IV PUSH: CPT

## 2024-07-25 PROCEDURE — 70450 CT HEAD/BRAIN W/O DYE: CPT | Mod: MC

## 2024-07-25 PROCEDURE — 83735 ASSAY OF MAGNESIUM: CPT

## 2024-07-25 PROCEDURE — 36415 COLL VENOUS BLD VENIPUNCTURE: CPT

## 2024-07-25 PROCEDURE — 70450 CT HEAD/BRAIN W/O DYE: CPT | Mod: 26,MC

## 2024-07-25 RX ORDER — METOCLOPRAMIDE 5 MG/5ML
10 SOLUTION ORAL ONCE
Refills: 0 | Status: COMPLETED | OUTPATIENT
Start: 2024-07-25 | End: 2024-07-25

## 2024-07-25 RX ADMIN — METOCLOPRAMIDE 104 MILLIGRAM(S): 5 SOLUTION ORAL at 23:22

## 2024-07-25 NOTE — ED ADULT TRIAGE NOTE - CHIEF COMPLAINT QUOTE
high blood pressure and pounding headache, states he took extra Metoprolol as per his PMDs instructions, states he has been eating a lot of salt the last few days

## 2024-07-26 VITALS — SYSTOLIC BLOOD PRESSURE: 174 MMHG | HEART RATE: 72 BPM | DIASTOLIC BLOOD PRESSURE: 83 MMHG

## 2024-07-26 NOTE — ED PROVIDER NOTE - CARE PROVIDER_API CALL
John Mcmahon [facility]  [address] [place] [address]  Phone: [phone]  Fax: [phone]  Follow Up Time: Routine

## 2024-07-26 NOTE — ED PROVIDER NOTE - PHYSICAL EXAMINATION
VITAL SIGNS: I have reviewed nursing notes and confirm.  CONSTITUTIONAL: well-appearing, non-toxic, NAD  SKIN: Warm dry, normal skin turgor, no acute rash, no bruising  HEAD: NCAT  EYES: EOMI, PERRLA, no scleral icterus, normal conjunctiva, no vision changes or blurry vision  ENT: Moist mucous membranes, OR clear. Normal pharynx  NECK: Supple; non tender. Full ROM. No cervical LAD  CARD: RRR, no murmurs, rubs or gallops  RESP: clear to ausculation b/l.  No rales, rhonchi, or wheezing. No increased WOB.  ABD: soft, + BS, non-tender, non-distended, no rebound or guarding. No CVA tenderness  EXT: Full ROM, no bony tenderness, pulses intact in bilateral UE and LE, no pedal edema, no calf tenderness  NEURO: normal motor. normal sensory. Normal gait.  PSYCH: Cooperative, appropriate.

## 2024-07-26 NOTE — ED PROVIDER NOTE - OBJECTIVE STATEMENT
64y male with PMHx of CHF 64y male with PMHx of DM, HTN, HLD, CHF, asthma, CAD (with PCI x 3, on Brilinta), hiatal hernia with GERD who presents for headache and elevated blood pressure. Reports he has been eating high sodium foods for the past 3 days.  Noticed he had a persistent headache since yesterday.  He checked his blood pressure, which was in the systolic 180s.  He called his PMD, who recommended he take an extra dose of home metoprolol.  Blood pressure remained elevated despite extra dose of medication.  Currently, patient reports he has a right-sided temporal headache and bilateral maxillary sinus pressure.  No fevers, chills, vision changes, chest pain, shortness breath, palpitations, nausea, vomiting, diarrhea, abdominal pain, urinary symptoms, weakness, numbness, difficulty ambulating, trauma.  Denies alcohol use, substance use.  Patient is a former smoker.

## 2024-07-26 NOTE — ED PROVIDER NOTE - CLINICAL SUMMARY MEDICAL DECISION MAKING FREE TEXT BOX
64-year-old presented today for evaluation of elevated blood pressure.  Patient is hemodynamically stable and well-appearing on evaluation.  Patient's labs were grossly unremarkable.  CT head showed no evidence of acute intracranial pathology.  Patient has nonspecific findings on CAT scan and was discharged to close follow-up outpatient for reevaluation.  Patient has no chest pain during ED stay.  Return precautions explained to patient.

## 2024-07-26 NOTE — ED PROVIDER NOTE - PATIENT PORTAL LINK FT
You can access the FollowMyHealth Patient Portal offered by [facility] by registering at the following website: [email or web address]. By joining [employer]’s FollowMyHealth portal, you will also be able to view your health information using other applications (apps) compatible with our system.
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
## 2025-03-07 NOTE — PATIENT PROFILE ADULT - DO YOU FEEL LIKE HURTING YOURSELF OR OTHERS?
The right coronary artery was selectively engaged and injected. Multiple views of the injected vessel were taken. no
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
## 2025-04-21 ENCOUNTER — APPOINTMENT (OUTPATIENT)
Dept: PULMONOLOGY | Facility: CLINIC | Age: 65
End: 2025-04-21
Payer: MEDICAID

## 2025-04-21 ENCOUNTER — NON-APPOINTMENT (OUTPATIENT)
Age: 65
End: 2025-04-21

## 2025-04-21 VITALS
WEIGHT: 200 LBS | RESPIRATION RATE: 14 BRPM | BODY MASS INDEX: 31.32 KG/M2 | DIASTOLIC BLOOD PRESSURE: 72 MMHG | SYSTOLIC BLOOD PRESSURE: 130 MMHG | HEART RATE: 85 BPM | OXYGEN SATURATION: 97 %

## 2025-04-21 DIAGNOSIS — J45.901 UNSPECIFIED ASTHMA WITH (ACUTE) EXACERBATION: ICD-10-CM

## 2025-04-21 DIAGNOSIS — J44.89 OTHER SPECIFIED CHRONIC OBSTRUCTIVE PULMONARY DISEASE: ICD-10-CM

## 2025-04-21 DIAGNOSIS — R91.8 OTHER NONSPECIFIC ABNORMAL FINDING OF LUNG FIELD: ICD-10-CM

## 2025-04-21 PROCEDURE — 71046 X-RAY EXAM CHEST 2 VIEWS: CPT

## 2025-04-21 PROCEDURE — 99214 OFFICE O/P EST MOD 30 MIN: CPT | Mod: 25

## 2025-04-21 RX ORDER — PREDNISONE 20 MG/1
20 TABLET ORAL
Qty: 15 | Refills: 0 | Status: ACTIVE | COMMUNITY
Start: 2025-04-21 | End: 1900-01-01

## 2025-04-21 RX ORDER — BUDESONIDE AND FORMOTEROL FUMARATE DIHYDRATE 160; 4.5 UG/1; UG/1
160-4.5 AEROSOL RESPIRATORY (INHALATION) TWICE DAILY
Qty: 1 | Refills: 2 | Status: ACTIVE | COMMUNITY
Start: 2025-04-21 | End: 1900-01-01

## 2025-05-12 NOTE — CONSULT NOTE ADULT - CONSULT REQUESTED BY NAME
Sita Bishop (:  1960) is a 65 y.o. female,Established patient, here for evaluation of the following chief complaint(s):  Follow-up (2 MONTH)         Assessment & Plan  Essential hypertension   Chronic, at goal (stable), continue current treatment plan    Orders:    Basic Metabolic Panel; Future    Vertigo    Meclizine prn           Return in about 6 months (around 2025).       Subjective   HPI in for blood pressure check. Tolerating chlorthalidone well. Did have an episode of dizziness, assc with Popping of the R ear. Last episode lasted for 5 days. Gets vertigo, room spins on her. Helped by meclizine.     Review of Systems       Objective   Physical Exam  HENT:      Ears:      Comments: Clear tms bilaterally  Cardiovascular:      Rate and Rhythm: Normal rate and regular rhythm.      Heart sounds: Normal heart sounds. No murmur heard.  Pulmonary:      Effort: Pulmonary effort is normal.      Breath sounds: Normal breath sounds.   Abdominal:      General: Abdomen is flat. Bowel sounds are normal.      Palpations: Abdomen is soft.   Musculoskeletal:      Right lower leg: No edema.      Left lower leg: No edema.                  An electronic signature was used to authenticate this note.    --Bridger Romero MD    Emergency Medicine

## 2025-05-27 NOTE — PATIENT PROFILE ADULT - SURGICAL SITE INCISION
Patient's daughter (Martha) called with BP readings since holding Midodrine beginning 5/5/25.     122/80  136/82  124/64  132/78  130/78  136/80 most recent 5/24/25   no

## 2025-06-24 ENCOUNTER — APPOINTMENT (OUTPATIENT)
Dept: PULMONOLOGY | Facility: CLINIC | Age: 65
End: 2025-06-24

## 2025-07-08 ENCOUNTER — APPOINTMENT (OUTPATIENT)
Dept: PULMONOLOGY | Facility: CLINIC | Age: 65
End: 2025-07-08
Payer: MEDICAID

## 2025-07-08 VITALS
DIASTOLIC BLOOD PRESSURE: 83 MMHG | OXYGEN SATURATION: 99 % | BODY MASS INDEX: 31.79 KG/M2 | WEIGHT: 203 LBS | HEART RATE: 82 BPM | SYSTOLIC BLOOD PRESSURE: 118 MMHG | RESPIRATION RATE: 14 BRPM

## 2025-07-08 PROCEDURE — G2211 COMPLEX E/M VISIT ADD ON: CPT | Mod: NC

## 2025-07-08 PROCEDURE — 99214 OFFICE O/P EST MOD 30 MIN: CPT

## 2025-07-08 RX ORDER — ALBUTEROL SULFATE 90 UG/1
108 (90 BASE) INHALANT RESPIRATORY (INHALATION)
Qty: 1 | Refills: 3 | Status: ACTIVE | COMMUNITY
Start: 2025-07-08 | End: 1900-01-01

## 2025-07-14 NOTE — ED ADULT TRIAGE NOTE - WEIGHT IN LBS
Caller: Patient    Reason for Call: Existing Patient - Symptom/ Medication Change Request or Issue / Med Auth Forms-  Symptoms or Request to Change Medication     Description of concern: Patient called and states that he was on Trazodone with DR. Lebron and now he sees Dr. Abram Gallagher.   He states that he is on Ambien and it is not working for him. He states that he is on Trazodone and he wants to increase it to 50mg, but Trazodone is not on his current medication list.   Patient states that he really needs to get some sleep because his boss passed away and he needs sleep so he can step up.     Please leave a message on voice mail if he does not answer.       Summary Concern(s) for Patient: Other: Can't sleep      ALL CALLERS: Can you give us some specific examples of how this issue is impacting your life right now?  Needs to be alert for his work.        In the past month, has the patient wished he/she were dead or wished they could go to sleep and not wake up? No    In the past month, has the patient had any thoughts of killing their self?  No    Did the patient/parent answer \"Yes\" to either of the above questions?:  NO      Are there any recent troublesome events or triggers (break-up/divorce, job/school changes, etc.) that could be impacting your mental health? - Yes, His boss just passed.       Changes in sleeping or eating patterns?  Can't sleep, wakes up several times at night.     Recent medication changes? If so, how long ago?   Ambien 10mg     Missed medications? (For RN Staff: REVIEW each medication and the dosage, frequency)  Yes     if not taking meds, why?   Ambien is not working     Suggestions on what you think will help you (your child)?  Would like to go back on Trazodone.    At what number can you be reached?   [phone]    Date of patient's next appointment (if none booked, Book One):   08-11-25    Will Route Message to PROVIDER'S CLINICAL POOL: Yes     199.9

## 2025-07-23 ENCOUNTER — RX RENEWAL (OUTPATIENT)
Age: 65
End: 2025-07-23